# Patient Record
Sex: FEMALE | Race: WHITE | NOT HISPANIC OR LATINO | Employment: OTHER | URBAN - METROPOLITAN AREA
[De-identification: names, ages, dates, MRNs, and addresses within clinical notes are randomized per-mention and may not be internally consistent; named-entity substitution may affect disease eponyms.]

---

## 2017-03-20 ENCOUNTER — HOSPITAL ENCOUNTER (OUTPATIENT)
Dept: LAB | Facility: MEDICAL CENTER | Age: 57
End: 2017-03-20
Attending: PSYCHIATRY & NEUROLOGY
Payer: MEDICARE

## 2017-03-20 LAB
ALBUMIN SERPL BCP-MCNC: 4.2 G/DL (ref 3.2–4.9)
ALBUMIN/GLOB SERPL: 1.2 G/DL
ALP SERPL-CCNC: 100 U/L (ref 30–99)
ALT SERPL-CCNC: 15 U/L (ref 2–50)
ANION GAP SERPL CALC-SCNC: 9 MMOL/L (ref 0–11.9)
AST SERPL-CCNC: 25 U/L (ref 12–45)
BASOPHILS # BLD AUTO: 0.1 K/UL (ref 0–0.12)
BASOPHILS NFR BLD AUTO: 1.4 % (ref 0–1.8)
BILIRUB SERPL-MCNC: 0.3 MG/DL (ref 0.1–1.5)
BUN SERPL-MCNC: 17 MG/DL (ref 8–22)
CALCIUM SERPL-MCNC: 9.7 MG/DL (ref 8.5–10.5)
CHLORIDE SERPL-SCNC: 109 MMOL/L (ref 96–112)
CO2 SERPL-SCNC: 25 MMOL/L (ref 20–33)
CREAT SERPL-MCNC: 0.94 MG/DL (ref 0.5–1.4)
EOSINOPHIL # BLD: 0.34 K/UL (ref 0–0.51)
EOSINOPHIL NFR BLD AUTO: 4.6 % (ref 0–6.9)
ERYTHROCYTE [DISTWIDTH] IN BLOOD BY AUTOMATED COUNT: 46.3 FL (ref 35.9–50)
GLOBULIN SER CALC-MCNC: 3.6 G/DL (ref 1.9–3.5)
GLUCOSE SERPL-MCNC: 80 MG/DL (ref 65–99)
HCT VFR BLD AUTO: 55.7 % (ref 37–47)
HGB BLD-MCNC: 17.5 G/DL (ref 12–16)
IMM GRANULOCYTES # BLD AUTO: 0.01 K/UL (ref 0–0.11)
IMM GRANULOCYTES NFR BLD AUTO: 0.1 % (ref 0–0.9)
LYMPHOCYTES # BLD: 2.37 K/UL (ref 1–4.8)
LYMPHOCYTES NFR BLD AUTO: 32.4 % (ref 22–41)
MCH RBC QN AUTO: 28 PG (ref 27–33)
MCHC RBC AUTO-ENTMCNC: 31.4 G/DL (ref 33.6–35)
MCV RBC AUTO: 89 FL (ref 81.4–97.8)
MONOCYTES # BLD: 0.37 K/UL (ref 0–0.85)
MONOCYTES NFR BLD AUTO: 5.1 % (ref 0–13.4)
NEUTROPHILS # BLD: 4.13 K/UL (ref 2–7.15)
NEUTROPHILS NFR BLD AUTO: 56.4 % (ref 44–72)
NRBC # BLD AUTO: 0 K/UL
NRBC BLD-RTO: 0 /100 WBC
PLATELET # BLD AUTO: 266 K/UL (ref 164–446)
PMV BLD AUTO: 9.3 FL (ref 9–12.9)
POTASSIUM SERPL-SCNC: 5.4 MMOL/L (ref 3.6–5.5)
PROT SERPL-MCNC: 7.8 G/DL (ref 6–8.2)
RBC # BLD AUTO: 6.26 M/UL (ref 4.2–5.4)
SODIUM SERPL-SCNC: 143 MMOL/L (ref 135–145)
WBC # BLD AUTO: 7.3 K/UL (ref 4.8–10.8)

## 2017-03-20 PROCEDURE — 36415 COLL VENOUS BLD VENIPUNCTURE: CPT

## 2017-03-20 PROCEDURE — 85025 COMPLETE CBC W/AUTO DIFF WBC: CPT

## 2017-03-20 PROCEDURE — 80053 COMPREHEN METABOLIC PANEL: CPT

## 2018-08-06 ENCOUNTER — PATIENT OUTREACH (OUTPATIENT)
Dept: HEALTH INFORMATION MANAGEMENT | Facility: OTHER | Age: 58
End: 2018-08-06

## 2018-08-06 NOTE — PROGRESS NOTES
1. Attempt #:1    2. HealthConnect Verified: no    3. Verify PCP: yes    4. Review Care Team: yes    5. WebIZ Checked & Epic Updated: No WebIZ record  · WebIZ Recommendations: COULD NOT FIND ON WEB IZ  · Is patient due for Tdap? YES. Patient was notified of copay/out of pocket cost.  · Is patient due for Shingles? YES. Patient was not notified of copay/out of pocket cost.    6. Communication Preference Obtained: yes    7. Annual Wellness Visit Scheduling  · Scheduling Status:Scheduled     9. Care Gap Scheduling (Attempt to Schedule EACH Overdue Care Gap!)     Scheduled patient for Annual Wellness Visit, Immunizations: PNEUMOVAX (PPSV23), TDAP and SHINGRIX (Shingles), Mammogram and Pap     10. Disruptor Beam Activation: already active    11. Disruptor Beam Nichole: no    12. Virtual Visits: no    13. Opt In to Text Messages: yes

## 2018-08-20 ENCOUNTER — OFFICE VISIT (OUTPATIENT)
Dept: MEDICAL GROUP | Facility: MEDICAL CENTER | Age: 58
End: 2018-08-20
Payer: MEDICARE

## 2018-08-20 VITALS
WEIGHT: 158.73 LBS | OXYGEN SATURATION: 93 % | HEIGHT: 62 IN | DIASTOLIC BLOOD PRESSURE: 80 MMHG | RESPIRATION RATE: 16 BRPM | HEART RATE: 86 BPM | SYSTOLIC BLOOD PRESSURE: 128 MMHG | BODY MASS INDEX: 29.21 KG/M2 | TEMPERATURE: 96.3 F

## 2018-08-20 DIAGNOSIS — Z13.29 SCREENING FOR THYROID DISORDER: ICD-10-CM

## 2018-08-20 DIAGNOSIS — I69.30 HISTORY OF CEREBROVASCULAR ACCIDENT (CVA) WITH RESIDUAL DEFICIT: ICD-10-CM

## 2018-08-20 DIAGNOSIS — Z12.31 ENCOUNTER FOR SCREENING MAMMOGRAM FOR BREAST CANCER: ICD-10-CM

## 2018-08-20 DIAGNOSIS — Z13.220 SCREENING, LIPID: ICD-10-CM

## 2018-08-20 DIAGNOSIS — Z12.11 COLON CANCER SCREENING: ICD-10-CM

## 2018-08-20 DIAGNOSIS — G40.909 SEIZURE DISORDER (HCC): ICD-10-CM

## 2018-08-20 DIAGNOSIS — F17.200 SMOKER: ICD-10-CM

## 2018-08-20 DIAGNOSIS — Z23 NEED FOR VACCINATION: ICD-10-CM

## 2018-08-20 DIAGNOSIS — Q27.30 AVM (ARTERIOVENOUS MALFORMATION): ICD-10-CM

## 2018-08-20 DIAGNOSIS — G81.94 LEFT HEMIPLEGIA (HCC): ICD-10-CM

## 2018-08-20 DIAGNOSIS — Z00.00 MEDICARE ANNUAL WELLNESS VISIT, SUBSEQUENT: ICD-10-CM

## 2018-08-20 DIAGNOSIS — E55.9 VITAMIN D DEFICIENCY DISEASE: ICD-10-CM

## 2018-08-20 PROCEDURE — 90670 PCV13 VACCINE IM: CPT | Performed by: NURSE PRACTITIONER

## 2018-08-20 PROCEDURE — G0439 PPPS, SUBSEQ VISIT: HCPCS | Mod: 25 | Performed by: NURSE PRACTITIONER

## 2018-08-20 PROCEDURE — G0009 ADMIN PNEUMOCOCCAL VACCINE: HCPCS | Performed by: NURSE PRACTITIONER

## 2018-08-20 ASSESSMENT — ACTIVITIES OF DAILY LIVING (ADL): BATHING_REQUIRES_ASSISTANCE: 0

## 2018-08-20 ASSESSMENT — PATIENT HEALTH QUESTIONNAIRE - PHQ9: CLINICAL INTERPRETATION OF PHQ2 SCORE: 0

## 2018-08-20 ASSESSMENT — ENCOUNTER SYMPTOMS: GENERAL WELL-BEING: GOOD

## 2018-08-20 NOTE — PROGRESS NOTES
Chief Complaint   Patient presents with   • Annual Wellness Visit         HPI:  Rubi is a 57 y.o. here for Medicare Annual Wellness Visit  Last seen 2 years ago      Patient Active Problem List    Diagnosis Date Noted   • Vitamin D deficiency disease 07/05/2016   • AVM (arteriovenous malformation) 06/29/2016   • S/P partial colectomy 06/29/2016   • Left hemiplegia (HCC) 06/29/2016   • History of cerebrovascular accident (CVA) with residual deficit 06/29/2016   • Smoker 06/29/2016       Current Outpatient Prescriptions   Medication Sig Dispense Refill   • oxcarbazepine (TRILEPTAL) 150 MG Tab      • vitamin D, Ergocalciferol, (DRISDOL) 02553 UNITS Cap capsule Take 1 Cap by mouth every 7 days. (Patient not taking: Reported on 8/20/2018) 12 Cap 5   • varenicline (CHANTIX STARTING MONTH PAK) 0.5 MG X 11 & 1 MG X 42 tablet Per packet (Patient not taking: Reported on 8/20/2018) 56 Tab 3   • DECADRON PO        No current facility-administered medications for this visit.         The patient is not taking most meds on list (only taking oxcarbazeprine) medication(s) due to:  cost of the medication.  Current supplements as per medication list.     Allergies: Patient has no known allergies.    Current social contact/activities: PT reports living with , visit with family and friends, baby sits grand kids daily     Is patient current with immunizations? No, due for PREVNAR (PCV13) , TDAP and SHINGRIX (Shingles). Patient is interested in receiving PREVNAR (PCV13)  today.    She  reports that she has been smoking Cigarettes.  She has a 42.00 pack-year smoking history. She has never used smokeless tobacco. She reports that she does not drink alcohol or use drugs.  Ready to quit: Not Answered  Counseling given: Not Answered        DPA/Advanced directive: Patient does not have an Advanced Directive.  A packet and workshop information was given on Advanced Directives.    ROS:    Gait: Uses no assistive device   Ostomy: No    Other tubes: No   Amputations: No   Chronic oxygen use No   Last eye exam several years ago   Wears hearing aids: No   : Denies any urinary leakage during the last 6 months     Depression Screening    Little interest or pleasure in doing things?  0 - not at all  Feeling down, depressed, or hopeless? 0 - not at all  Patient Health Questionnaire Score: 0        Screening for Cognitive Impairment    Three Minute Recall (leader, season, table)  3/3    Yomi clock face with all 12 numbers and set the hands to show 10 past 11.  Yes 2/2  If cognitive concerns identified, deferred for follow up unless specifically addressed in assessment and plan.    Fall Risk Assessment    Has the patient had two or more falls in the last year or any fall with injury in the last year?  No  If fall risk identified, deferred for follow up unless specifically addressed in assessment and plan.    Safety Assessment    Throw rugs on floor.  No  Handrails on all stairs.  Yes  Good lighting in all hallways.  Yes  Difficulty hearing.  No  Patient counseled about all safety risks that were identified.    Functional Assessment ADLs    Are there any barriers preventing you from cooking for yourself or meeting nutritional needs?  No.    Are there any barriers preventing you from driving safely or obtaining transportation?  No.    Are there any barriers preventing you from using a telephone or calling for help?  No.    Are there any barriers preventing you from shopping?  No.    Are there any barriers preventing you from taking care of your own finances?  No.    Are there any barriers preventing you from managing your medications?  No.    Are there any barriers preventing you from showering, bathing or dressing yourself?  No.    Are you currently engaging in any exercise or physical activity?  Yes.  PT reports doing own house hold choirs and gardening.  What is your perception of your health?  Good.    Health Maintenance Summary                Annual  Wellness Visit Overdue 1960     IMM DTaP/Tdap/Td Vaccine Overdue 11/21/1979     IMM PNEUMOCOCCAL 19-64 (ADULT) MEDIUM RISK SERIES Overdue 11/21/1979     PAP SMEAR Overdue 5/1/1988      Done 5/1/1985 nl    MAMMOGRAM Overdue 11/21/2000     COLONOSCOPY Overdue 11/21/2010     IMM ZOSTER VACCINES Overdue 11/21/2010     IMM INFLUENZA Next Due 9/1/2018           Patient Care Team:  DARIUS Mazariegos as PCP - General (Family Medicine)  Sam Sullivan M.D. as Consulting Physician (Neurology)    Social History   Substance Use Topics   • Smoking status: Current Every Day Smoker     Packs/day: 1.00     Years: 42.00     Types: Cigarettes   • Smokeless tobacco: Never Used      Comment: Started smoking at age 16   • Alcohol use No     Family History   Problem Relation Age of Onset   • Lung Disease Mother         asthma   • Heart Attack Mother    • Diabetes Father    • Cancer Neg Hx      She  has a past medical history of Asthma; AVM (arteriovenous malformation) (rut/durga/ann, 2008); Migraine; and Partial bowel obstruction (HCC). She also has no past medical history of Anxiety; Arrhythmia; Blood transfusion without reported diagnosis; Brain tumor (HCC); Cancer (HCC); CHF (congestive heart failure) (HCC); Clotting disorder (HCC); COPD (chronic obstructive pulmonary disease) (HCC); Depression; Diabetes (HCC); Diabetic neuropathy (HCC); Goiter; Heart attack (HCC); Heart murmur; Hyperlipidemia; Hypertension; IBD (inflammatory bowel disease); Muscle disorder; Osteoporosis; Pulmonary emphysema (HCC); Seizure (HCC); Stroke (HCC); Substance abuse; or Tuberculosis.   Past Surgical History:   Procedure Laterality Date   • COLON RESECTION      colostomy, colostomy take d   • COLOSTOMY TAKEDOWN     • CRANIOTOMY      AVM treatment   • TONSILLECTOMY     • TUBAL COAGULATION LAPAROSCOPIC BILATERAL             Exam:     Blood pressure 128/80, pulse 86, temperature (!) 35.7 °C (96.3 °F), resp. rate 16, height 1.575 m (5'  "2\"), weight 72 kg (158 lb 11.7 oz), SpO2 93 %. Body mass index is 29.03 kg/m².    Hearing good.    Dentition multiple carries  Alert, oriented in no acute distress.  Eye contact is good, speech goal directed, affect calm  RRR  CTAB    Assessment and Plan. The following treatment and monitoring plan is recommended:    1. Medicare annual wellness visit, subsequent screenings reviewed    - Annual Wellness Visit - Includes PPPS Subsequent ()    2. History of cerebrovascular accident (CVA) with residual deficit/Left hemiplegia (HCC)/ AVM (arteriovenous malformation)/ Seizure disorder (HCC)  stable.  Asymptomatic with seizures.  Compliant withTrileptal.  She is followed routinely by her neurologist Dr. Sullivan   - COMP METABOLIC PANEL; Future  - CBC WITH DIFFERENTIAL; Future  - VITAMIN B12; Future  - FOLATE; Future    6. Vitamin D deficiency disease  Recommend restarting over-the-counter vitamin D 2000 units daily, will monitor her vitamin D levels  - VITAMIN D,25 HYDROXY; Future    7. Smoker  Cessation encouraged, not interested in quitting today    8. Encounter for screening mammogram for breast cancer  ordered  - MA-SCREEN MAMMO W/CAD-BILAT; Future    9. Colon cancer screening  Referral  - REFERRAL TO GASTROENTEROLOGY    10. Screening, lipid  Ordered  - LIPID PROFILE; Future    11. Screening for thyroid disorder  Ordered  - TSH; Future    12. Need for vaccination  I have placed the below orders and discussed them with an approved delegating provider. The MA is performing the below orders under the direction of an office MD.  -Given today.  - PNEUMOCOCCAL CONJUGATE VACCINE 13-VALENT        Services suggested: No services needed at this time  Health Care Screening recommendations as per orders if indicated.  Referrals offered: PT/OT/Nutrition counseling/Behavioral Health/Smoking cessation as per orders if indicated.    Discussion today about general wellness and lifestyle habits:    · Prevent falls and reduce trip " hazards; Cautioned about securing or removing rugs.  · Have a working fire alarm and carbon monoxide detector;   · Engage in regular physical activity and social activities       Follow-up: No Follow-up on file.

## 2018-08-24 ENCOUNTER — HOSPITAL ENCOUNTER (OUTPATIENT)
Dept: LAB | Facility: MEDICAL CENTER | Age: 58
End: 2018-08-24
Attending: NURSE PRACTITIONER
Payer: MEDICARE

## 2018-08-24 ENCOUNTER — HOSPITAL ENCOUNTER (OUTPATIENT)
Dept: RADIOLOGY | Facility: MEDICAL CENTER | Age: 58
End: 2018-08-24
Attending: NURSE PRACTITIONER
Payer: MEDICARE

## 2018-08-24 DIAGNOSIS — Z13.220 SCREENING, LIPID: ICD-10-CM

## 2018-08-24 DIAGNOSIS — Z13.29 SCREENING FOR THYROID DISORDER: ICD-10-CM

## 2018-08-24 DIAGNOSIS — Z12.39 SCREENING FOR BREAST CANCER: ICD-10-CM

## 2018-08-24 DIAGNOSIS — G40.909 SEIZURE DISORDER (HCC): ICD-10-CM

## 2018-08-24 DIAGNOSIS — E55.9 VITAMIN D DEFICIENCY DISEASE: ICD-10-CM

## 2018-08-24 LAB
25(OH)D3 SERPL-MCNC: 22 NG/ML (ref 30–100)
ALBUMIN SERPL BCP-MCNC: 4.3 G/DL (ref 3.2–4.9)
ALBUMIN/GLOB SERPL: 1.2 G/DL
ALP SERPL-CCNC: 92 U/L (ref 30–99)
ALT SERPL-CCNC: 13 U/L (ref 2–50)
ANION GAP SERPL CALC-SCNC: 9 MMOL/L (ref 0–11.9)
AST SERPL-CCNC: 18 U/L (ref 12–45)
BASOPHILS # BLD AUTO: 1 % (ref 0–1.8)
BASOPHILS # BLD: 0.08 K/UL (ref 0–0.12)
BILIRUB SERPL-MCNC: 0.3 MG/DL (ref 0.1–1.5)
BUN SERPL-MCNC: 16 MG/DL (ref 8–22)
CALCIUM SERPL-MCNC: 9.5 MG/DL (ref 8.5–10.5)
CHLORIDE SERPL-SCNC: 103 MMOL/L (ref 96–112)
CHOLEST SERPL-MCNC: 281 MG/DL (ref 100–199)
CO2 SERPL-SCNC: 25 MMOL/L (ref 20–33)
CREAT SERPL-MCNC: 0.87 MG/DL (ref 0.5–1.4)
EOSINOPHIL # BLD AUTO: 0.14 K/UL (ref 0–0.51)
EOSINOPHIL NFR BLD: 1.8 % (ref 0–6.9)
ERYTHROCYTE [DISTWIDTH] IN BLOOD BY AUTOMATED COUNT: 43.5 FL (ref 35.9–50)
FOLATE SERPL-MCNC: 11.2 NG/ML
GLOBULIN SER CALC-MCNC: 3.7 G/DL (ref 1.9–3.5)
GLUCOSE SERPL-MCNC: 84 MG/DL (ref 65–99)
HCT VFR BLD AUTO: 55 % (ref 37–47)
HDLC SERPL-MCNC: 43 MG/DL
HGB BLD-MCNC: 18.1 G/DL (ref 12–16)
IMM GRANULOCYTES # BLD AUTO: 0.03 K/UL (ref 0–0.11)
IMM GRANULOCYTES NFR BLD AUTO: 0.4 % (ref 0–0.9)
LDLC SERPL CALC-MCNC: 192 MG/DL
LYMPHOCYTES # BLD AUTO: 1.74 K/UL (ref 1–4.8)
LYMPHOCYTES NFR BLD: 22.1 % (ref 22–41)
MCH RBC QN AUTO: 28.6 PG (ref 27–33)
MCHC RBC AUTO-ENTMCNC: 32.9 G/DL (ref 33.6–35)
MCV RBC AUTO: 86.9 FL (ref 81.4–97.8)
MONOCYTES # BLD AUTO: 0.46 K/UL (ref 0–0.85)
MONOCYTES NFR BLD AUTO: 5.8 % (ref 0–13.4)
NEUTROPHILS # BLD AUTO: 5.44 K/UL (ref 2–7.15)
NEUTROPHILS NFR BLD: 68.9 % (ref 44–72)
NRBC # BLD AUTO: 0 K/UL
NRBC BLD-RTO: 0 /100 WBC
PLATELET # BLD AUTO: 270 K/UL (ref 164–446)
PMV BLD AUTO: 9.3 FL (ref 9–12.9)
POTASSIUM SERPL-SCNC: 4 MMOL/L (ref 3.6–5.5)
PROT SERPL-MCNC: 8 G/DL (ref 6–8.2)
RBC # BLD AUTO: 6.33 M/UL (ref 4.2–5.4)
SODIUM SERPL-SCNC: 137 MMOL/L (ref 135–145)
TRIGL SERPL-MCNC: 230 MG/DL (ref 0–149)
TSH SERPL DL<=0.005 MIU/L-ACNC: 1.15 UIU/ML (ref 0.38–5.33)
VIT B12 SERPL-MCNC: 297 PG/ML (ref 211–911)
WBC # BLD AUTO: 7.9 K/UL (ref 4.8–10.8)

## 2018-08-24 PROCEDURE — 77067 SCR MAMMO BI INCL CAD: CPT

## 2018-08-24 PROCEDURE — 84443 ASSAY THYROID STIM HORMONE: CPT | Mod: GA

## 2018-08-24 PROCEDURE — 82306 VITAMIN D 25 HYDROXY: CPT

## 2018-08-24 PROCEDURE — 80061 LIPID PANEL: CPT | Mod: GA

## 2018-08-24 PROCEDURE — 36415 COLL VENOUS BLD VENIPUNCTURE: CPT | Mod: GA

## 2018-08-24 PROCEDURE — 82607 VITAMIN B-12: CPT

## 2018-08-24 PROCEDURE — 82746 ASSAY OF FOLIC ACID SERUM: CPT

## 2018-08-24 PROCEDURE — 80053 COMPREHEN METABOLIC PANEL: CPT

## 2018-08-24 PROCEDURE — 85025 COMPLETE CBC W/AUTO DIFF WBC: CPT

## 2018-08-27 ENCOUNTER — HOSPITAL ENCOUNTER (OUTPATIENT)
Facility: MEDICAL CENTER | Age: 58
End: 2018-08-27
Attending: NURSE PRACTITIONER
Payer: MEDICARE

## 2018-08-27 ENCOUNTER — OFFICE VISIT (OUTPATIENT)
Dept: MEDICAL GROUP | Facility: MEDICAL CENTER | Age: 58
End: 2018-08-27
Payer: MEDICARE

## 2018-08-27 VITALS
DIASTOLIC BLOOD PRESSURE: 64 MMHG | SYSTOLIC BLOOD PRESSURE: 108 MMHG | HEART RATE: 71 BPM | OXYGEN SATURATION: 96 % | BODY MASS INDEX: 29.08 KG/M2 | TEMPERATURE: 97 F | WEIGHT: 158 LBS | HEIGHT: 62 IN | RESPIRATION RATE: 16 BRPM

## 2018-08-27 DIAGNOSIS — E78.00 PURE HYPERCHOLESTEROLEMIA: ICD-10-CM

## 2018-08-27 DIAGNOSIS — K43.9 VENTRAL HERNIA WITHOUT OBSTRUCTION OR GANGRENE: ICD-10-CM

## 2018-08-27 DIAGNOSIS — Z12.4 CERVICAL CANCER SCREENING: ICD-10-CM

## 2018-08-27 DIAGNOSIS — E56.9 VITAMIN DEFICIENCY: ICD-10-CM

## 2018-08-27 DIAGNOSIS — Z11.51 ENCOUNTER FOR SCREENING FOR HUMAN PAPILLOMAVIRUS (HPV): ICD-10-CM

## 2018-08-27 DIAGNOSIS — Z01.419 WELL WOMAN EXAM WITH ROUTINE GYNECOLOGICAL EXAM: ICD-10-CM

## 2018-08-27 PROCEDURE — G0101 CA SCREEN;PELVIC/BREAST EXAM: HCPCS | Performed by: NURSE PRACTITIONER

## 2018-08-27 PROCEDURE — 88175 CYTOPATH C/V AUTO FLUID REDO: CPT

## 2018-08-27 PROCEDURE — 87624 HPV HI-RISK TYP POOLED RSLT: CPT

## 2018-08-27 PROCEDURE — 99214 OFFICE O/P EST MOD 30 MIN: CPT | Mod: 25 | Performed by: NURSE PRACTITIONER

## 2018-08-27 RX ORDER — ATORVASTATIN CALCIUM 40 MG/1
40 TABLET, FILM COATED ORAL
Qty: 90 TAB | Refills: 3 | Status: SHIPPED | OUTPATIENT
Start: 2018-08-27 | End: 2019-09-11 | Stop reason: SDUPTHER

## 2018-08-28 DIAGNOSIS — Z12.4 CERVICAL CANCER SCREENING: ICD-10-CM

## 2018-08-28 DIAGNOSIS — Z11.51 ENCOUNTER FOR SCREENING FOR HUMAN PAPILLOMAVIRUS (HPV): ICD-10-CM

## 2018-08-28 DIAGNOSIS — Z01.419 WELL WOMAN EXAM WITH ROUTINE GYNECOLOGICAL EXAM: ICD-10-CM

## 2018-08-28 NOTE — PROGRESS NOTES
"CC: Gynecologic Exam        HPI:   Rubi presents today for the followin. Well woman exam with routine gynecological exam/. Encounter for screening for human papillomavirus (HPV)/Cervical cancer screening  Here for routine Pap smear.  Last Pap 36 years ago.  Monogamous.  No pelvic bleeding or abnormal symptoms to complain of    4. Pure hypercholesterolemia  Labs done last week showed significantly elevated LDL with approximately being 200.  Personal history of stroke.  Not on a statin.    7. Vitamin deficiency  Does have borderline low vitamin D.  Does not take any supplements    Current Outpatient Prescriptions   Medication Sig Dispense Refill   • atorvastatin (LIPITOR) 40 MG Tab Take 1 Tab by mouth every bedtime. 90 Tab 3   • oxcarbazepine (TRILEPTAL) 150 MG Tab        No current facility-administered medications for this visit.      Social History   Substance Use Topics   • Smoking status: Current Every Day Smoker     Packs/day: 1.00     Years: 42.00     Types: Cigarettes   • Smokeless tobacco: Never Used      Comment: Started smoking at age 16   • Alcohol use No     I reviewed patients allergies, problem list and medications today in [facility].    ROS: Any/all pertinent positives listed in the HPI, otherwise all others reviewed are negative today.        /64   Pulse 71   Temp 36.1 °C (97 °F)   Resp 16   Ht 1.575 m (5' 2\")   Wt 71.7 kg (158 lb)   SpO2 96%   BMI 28.90 kg/m²   Physical Exam:    Gen:         Alert and oriented, No apparent distress. WDWN  Psych:     A+Ox3, normal affect and mood  Skin:     Warm, dry and intact. Good turgor       No rashes in visible areas.  Eye:         Conjunctiva clear, lids normal  ENMT:     Lips without lesions, fair dentition       Oropharynx clear  Neck:       No Lymphadenopathy, Thyromegaly, Bruits.       Trachea midline, no masses  Lungs:     Clear to auscultation bilaterally, no rales or rhonchi       Unlabored respiratory effort  CV:          Regular rate and " rhythm, S1, S2. No murmurs.       No Edema  Abd:         Soft non tender, non distended. Normal active bowel sounds.         No  Hepatosplenomegaly, No pulsatile masses.       Does have a soft protuberance to the right of her colostomy/colostomy takedown scars.  Does bulge slightly with Valsalva.  Patient self reports that it increases if she is bloated or constipated.  She has no associated pain or discomfort with this              Ext:          No clubbing, cyanosis, edema. Pedal pulses 2+ bilaterally.    Breasts:  No dimpling/nipple changes/masses bilaterally.  Reviewed SBEs  Vulva:       No lesions/erythema Vault: No erythema/odor/discharge  Cervix:     No lesions/ No CMT      Uterus: 7 cm; midline; nontender to bimanual; no adnexal masses or tenderness        Assessment and Plan.   57 y.o. female here for the followin. Well woman exam with routine gynecological exam/ Encounter for screening for human papillomavirus (HPV)/ Cervical cancer screening  Clinically stable.  If normal repeat in 3 years  - THINPREP PAP WITH HPV; Future    4.    Ventral hernia without obstruction or gangrene  Clinically stable without changes in years.  Previous general surgeon with Dr. Ibrahim up for her colostomy and colostomy takedown.  - US-HERNIA ABDOMEN; Future    6. Pure hypercholesterolemia  Clinically stable.  Will start atorvastatin 40 mg.  Will recheck labs in 3 months  - atorvastatin (LIPITOR) 40 MG Tab; Take 1 Tab by mouth every bedtime.  Dispense: 90 Tab; Refill: 3  - COMP METABOLIC PANEL; Future  - LIPID PROFILE; Future    7. Vitamin deficiency  Patient will start an oral B12 and will monitor levels in 3 months  - CBC WITH DIFFERENTIAL; Future  - VITAMIN B12; Future

## 2018-08-29 LAB
CYTOLOGY REG CYTOL: NORMAL
HPV HR 12 DNA CVX QL NAA+PROBE: NEGATIVE
HPV16 DNA SPEC QL NAA+PROBE: NEGATIVE
HPV18 DNA SPEC QL NAA+PROBE: NEGATIVE
SPECIMEN SOURCE: NORMAL

## 2018-08-31 ENCOUNTER — HOSPITAL ENCOUNTER (OUTPATIENT)
Dept: RADIOLOGY | Facility: MEDICAL CENTER | Age: 58
End: 2018-08-31
Attending: NURSE PRACTITIONER
Payer: MEDICARE

## 2018-08-31 DIAGNOSIS — K43.9 VENTRAL HERNIA WITHOUT OBSTRUCTION OR GANGRENE: ICD-10-CM

## 2018-08-31 PROCEDURE — 76705 ECHO EXAM OF ABDOMEN: CPT

## 2018-09-03 ENCOUNTER — TELEPHONE (OUTPATIENT)
Dept: MEDICAL GROUP | Facility: MEDICAL CENTER | Age: 58
End: 2018-09-03

## 2018-09-03 DIAGNOSIS — K46.9 HERNIA DUE TO COLOSTOMY (HCC): ICD-10-CM

## 2018-09-03 DIAGNOSIS — K94.09 HERNIA DUE TO COLOSTOMY (HCC): ICD-10-CM

## 2018-09-03 NOTE — TELEPHONE ENCOUNTER
Jeovany notify patient  The uS did show a hernia (which we knew).  It includes intestine and doesn't go back down on its own.  Therefore I do want her to consult with her surgeon to see if this needs to be resolved to prevent issues with her bowel later.  I now this is very stable without changes for years.      My chart sent as well  refe ruby placed to consult.

## 2018-09-04 NOTE — TELEPHONE ENCOUNTER
Tried to contact pt by phone but both numbers aren't working. Pt saw the imaging results but unsure if she saw message. Will send Caliopat.

## 2019-05-21 ENCOUNTER — OFFICE VISIT (OUTPATIENT)
Dept: MEDICAL GROUP | Facility: MEDICAL CENTER | Age: 59
End: 2019-05-21
Payer: MEDICARE

## 2019-05-21 VITALS
SYSTOLIC BLOOD PRESSURE: 124 MMHG | OXYGEN SATURATION: 95 % | HEIGHT: 62 IN | WEIGHT: 162 LBS | HEART RATE: 90 BPM | TEMPERATURE: 98.7 F | BODY MASS INDEX: 29.81 KG/M2 | DIASTOLIC BLOOD PRESSURE: 74 MMHG | RESPIRATION RATE: 16 BRPM

## 2019-05-21 DIAGNOSIS — E78.5 DYSLIPIDEMIA: ICD-10-CM

## 2019-05-21 DIAGNOSIS — Z12.11 COLON CANCER SCREENING: ICD-10-CM

## 2019-05-21 DIAGNOSIS — G40.909 SEIZURE DISORDER (HCC): ICD-10-CM

## 2019-05-21 DIAGNOSIS — I69.30 HISTORY OF CEREBROVASCULAR ACCIDENT (CVA) WITH RESIDUAL DEFICIT: ICD-10-CM

## 2019-05-21 DIAGNOSIS — Q27.30 AVM (ARTERIOVENOUS MALFORMATION): ICD-10-CM

## 2019-05-21 DIAGNOSIS — R06.83 SNORING: ICD-10-CM

## 2019-05-21 PROCEDURE — 99214 OFFICE O/P EST MOD 30 MIN: CPT | Performed by: NURSE PRACTITIONER

## 2019-05-21 ASSESSMENT — PATIENT HEALTH QUESTIONNAIRE - PHQ9: CLINICAL INTERPRETATION OF PHQ2 SCORE: 0

## 2019-05-21 NOTE — PROGRESS NOTES
"CC: Referral Needed        HPI:     Rubi presents today for the followin. AVM (arteriovenous malformation)/ History of cerebrovascular accident (CVA) with residual deficit/Seizure disorder (HCC)  Currently followed by Dr. Sullivan however he is retiring.  She just saw him a few weeks ago and he recommended she get established with a new neurologist provider.  She is here today stating she needs a referral for this.  She is however compliant with her Trileptal and doing well in terms of seizures.  She denies needing a refill currently    4. Colon cancer screening  Declines    5. Dyslipidemia  She did start the atorvastatin 40 mg in January.  She is tolerating well and has no adverse reaction.  She is overdue to repeat her lipid panel    6. Snoring  We did review the labs that she had done in August we did discuss an elevated H&H.  She does snore and her  states she wakes her up because she is snoring so loud.  She also is a significant smoker.    Current Outpatient Prescriptions   Medication Sig Dispense Refill   • atorvastatin (LIPITOR) 40 MG Tab Take 1 Tab by mouth every bedtime. 90 Tab 3   • oxcarbazepine (TRILEPTAL) 150 MG Tab        No current facility-administered medications for this visit.      Social History   Substance Use Topics   • Smoking status: Current Every Day Smoker     Packs/day: 1.00     Years: 42.00     Types: Cigarettes   • Smokeless tobacco: Never Used      Comment: Started smoking at age 16   • Alcohol use No     I reviewed patients allergies, problem list and medications today in [facility].    ROS: Any/all pertinent positives listed in the HPI, otherwise all others reviewed are negative today.      /74 (BP Location: Left arm)   Pulse 90   Temp 37.1 °C (98.7 °F)   Resp 16   Ht 1.575 m (5' 2\")   Wt 73.5 kg (162 lb)   SpO2 95%   BMI 29.63 kg/m²     Exam:    Gen: Alert and oriented, No apparent distress. WDWN  Psych: A+Ox3, normal affect and mood  Skin: Warm, dry and intact. " Good turgor   No rashes in visible areas.  Eye: Conjunctiva clear, lids normal  ENMT: Lips without lesions, fair-poor dentition  Neck: No Lymphadenopathy, Thyromegaly, Bruits.   Trachea midline, no masses  Lungs: Clear to auscultation bilaterally, no rales or rhonchi   Unlabored respiratory effort.   CV: Regular rate and rhythm, S1, S2. No murmurs.   No Edema        Assessment and Plan.   58 y.o. female with the following issues.    1. AVM (arteriovenous malformation)/History of cerebrovascular accident (CVA) with residual deficit//Seizure disorder (HCC)  Clinically stable.  She will continue her Trileptal and referral was placed to establish with a new neurologist.  - REFERRAL TO NEUROLOGY    4. Colon cancer screening  Declines both home testing and colonoscopy    5. Dyslipidemia  Continue statin, due for lipid panel.  Should have this done before I see her in August.  We may need dose adjustment.  She is encouraged to quit smoking    6. Snoring  OPO on RA.  She is encouraged to quit smoking        Patient will be due for the pneumococcal 23 when she comes in in August.  She will consider Tdap at that time.

## 2019-05-21 NOTE — LETTER
May 21, 2019     Rubi Urbina  [address] [place] [address]      Dear Rubi:    Thank you for enrolling in [employer]. Please follow the instructions below to securely access your online medical record. [employer] allows you to send messages to your healthcare team, view certain test results, renew your prescriptions, schedule appointments, and more.     How Do I Sign Up?  1. In your Internet browser, go to  [email or web address]  2. Click on the Enter Code link in the Sign In box. You will see the New Member Sign Up page.  3. Enter your TVDeck Access Code exactly as it appears below (case sensitive). You will not need to use this code after you’ve completed the sign-up process. If you do not sign up before the expiration date, you must request a new code.  TV[employer] Access Code: [email or web address]  Expires: 6/20/2019  3:51 PM    4. Enter your Email address and Date of Birth (mm/dd/yyyy) as indicated and click Submit. You will be taken to the next sign-up page.  5. Create a TVDeck ID (case sensitive) . This will be your TV[employer]ck login ID and cannot be changed, so think of one that is secure and easy to remember.  6. Create a TV[employer]ck password  (case sensitive).   · Your password must be a length of at least 6 characters/digits.  · It must include at least 1 numeric.  · You can change your password at any time.  7. Enter your Password Reset Question and Answer. This can be used at a later time if you forget your password.   8. Enter your e-mail address. You will receive e-mail notification when new information is available in [employer].  9. Click Sign Up. You can now view your medical record.     Additional Information  Please contact [employer] Customer Support at [phone] for any questions . Remember, [employer] is NOT to be used for urgent needs. For medical emergencies, dial 911.          Introducing [employer]    [facility]’s Secure, Online Health Connection      [facility] now offers  convenient, online access to your healthcare team and personal health information.  [employer] makes managing your healthcare easier than ever, and allows you to:  • Email your healthcare provider securely and privately  • Access your test results  • Request prescription refills 24 hours a day  • View your personal medical records from home  • Schedule or change your appointments  • View your Insurance and Billing Information  • Pay bills online ? Coming soon!        Sign below to get started:  I hereby request access to [employer] application.  I agree to abide by the [employer] Terms and Conditions, which will be provided to me upon activating my account.       __________________________________        _________________________  Name (Please Print)          Date of Birth     __________________________________       _________________________  Signature          Primary Care Provider      _______________  Date                          *For Internal Use Only: Please scan this form into the patient’s chart. Click on  - Select Patient - Attach to Encounter:  - Document Type: Consent   - Document Description: MyChart Consent

## 2019-06-21 ENCOUNTER — APPOINTMENT (OUTPATIENT)
Dept: ADMISSIONS | Facility: MEDICAL CENTER | Age: 59
DRG: 355 | End: 2019-06-21
Attending: SURGERY
Payer: MEDICARE

## 2019-06-21 DIAGNOSIS — Z01.810 PRE-OPERATIVE CARDIOVASCULAR EXAMINATION: ICD-10-CM

## 2019-06-21 LAB — EKG IMPRESSION: NORMAL

## 2019-06-26 ENCOUNTER — ANESTHESIA EVENT (OUTPATIENT)
Dept: SURGERY | Facility: MEDICAL CENTER | Age: 59
DRG: 355 | End: 2019-06-26
Payer: MEDICARE

## 2019-06-27 ENCOUNTER — ANESTHESIA (OUTPATIENT)
Dept: SURGERY | Facility: MEDICAL CENTER | Age: 59
DRG: 355 | End: 2019-06-27
Payer: MEDICARE

## 2019-06-27 ENCOUNTER — HOSPITAL ENCOUNTER (INPATIENT)
Facility: MEDICAL CENTER | Age: 59
LOS: 1 days | DRG: 355 | End: 2019-06-28
Attending: SURGERY | Admitting: SURGERY
Payer: MEDICARE

## 2019-06-27 PROBLEM — K43.6 INCARCERATED VENTRAL HERNIA: Status: ACTIVE | Noted: 2019-06-27

## 2019-06-27 PROBLEM — I95.9 HYPOTENSION: Status: ACTIVE | Noted: 2019-06-27

## 2019-06-27 LAB
ALBUMIN SERPL BCP-MCNC: 3.3 G/DL (ref 3.2–4.9)
ALBUMIN/GLOB SERPL: 1.3 G/DL
ALP SERPL-CCNC: 88 U/L (ref 30–99)
ALT SERPL-CCNC: 13 U/L (ref 2–50)
ANION GAP SERPL CALC-SCNC: 8 MMOL/L (ref 0–11.9)
AST SERPL-CCNC: 13 U/L (ref 12–45)
BILIRUB SERPL-MCNC: 0.4 MG/DL (ref 0.1–1.5)
BUN SERPL-MCNC: 15 MG/DL (ref 8–22)
CALCIUM SERPL-MCNC: 8.3 MG/DL (ref 8.5–10.5)
CHLORIDE SERPL-SCNC: 108 MMOL/L (ref 96–112)
CO2 SERPL-SCNC: 24 MMOL/L (ref 20–33)
CREAT SERPL-MCNC: 0.89 MG/DL (ref 0.5–1.4)
EKG IMPRESSION: NORMAL
ERYTHROCYTE [DISTWIDTH] IN BLOOD BY AUTOMATED COUNT: 44.4 FL (ref 35.9–50)
ERYTHROCYTE [DISTWIDTH] IN BLOOD BY AUTOMATED COUNT: 45.8 FL (ref 35.9–50)
GLOBULIN SER CALC-MCNC: 2.5 G/DL (ref 1.9–3.5)
GLUCOSE SERPL-MCNC: 175 MG/DL (ref 65–99)
HCT VFR BLD AUTO: 39.8 % (ref 37–47)
HCT VFR BLD AUTO: 46.3 % (ref 37–47)
HGB BLD-MCNC: 12.3 G/DL (ref 12–16)
HGB BLD-MCNC: 12.5 G/DL (ref 12–16)
HGB BLD-MCNC: 14.6 G/DL (ref 12–16)
MCH RBC QN AUTO: 27.6 PG (ref 27–33)
MCH RBC QN AUTO: 27.7 PG (ref 27–33)
MCHC RBC AUTO-ENTMCNC: 30.9 G/DL (ref 33.6–35)
MCHC RBC AUTO-ENTMCNC: 31.5 G/DL (ref 33.6–35)
MCV RBC AUTO: 87.9 FL (ref 81.4–97.8)
MCV RBC AUTO: 89.2 FL (ref 81.4–97.8)
PLATELET # BLD AUTO: 253 K/UL (ref 164–446)
PLATELET # BLD AUTO: 254 K/UL (ref 164–446)
PMV BLD AUTO: 8.5 FL (ref 9–12.9)
PMV BLD AUTO: 8.7 FL (ref 9–12.9)
POTASSIUM SERPL-SCNC: 4.2 MMOL/L (ref 3.6–5.5)
PROT SERPL-MCNC: 5.8 G/DL (ref 6–8.2)
RBC # BLD AUTO: 4.46 M/UL (ref 4.2–5.4)
RBC # BLD AUTO: 5.27 M/UL (ref 4.2–5.4)
SODIUM SERPL-SCNC: 140 MMOL/L (ref 135–145)
TROPONIN I SERPL-MCNC: <0.01 NG/ML (ref 0–0.04)
WBC # BLD AUTO: 15 K/UL (ref 4.8–10.8)
WBC # BLD AUTO: 16 K/UL (ref 4.8–10.8)

## 2019-06-27 PROCEDURE — 700111 HCHG RX REV CODE 636 W/ 250 OVERRIDE (IP): Performed by: SURGERY

## 2019-06-27 PROCEDURE — 160009 HCHG ANES TIME/MIN: Performed by: SURGERY

## 2019-06-27 PROCEDURE — 160027 HCHG SURGERY MINUTES - 1ST 30 MINS LEVEL 2: Performed by: SURGERY

## 2019-06-27 PROCEDURE — 93010 ELECTROCARDIOGRAM REPORT: CPT | Performed by: INTERNAL MEDICINE

## 2019-06-27 PROCEDURE — 93005 ELECTROCARDIOGRAM TRACING: CPT | Performed by: SURGERY

## 2019-06-27 PROCEDURE — A9270 NON-COVERED ITEM OR SERVICE: HCPCS | Performed by: ANESTHESIOLOGY

## 2019-06-27 PROCEDURE — 700101 HCHG RX REV CODE 250

## 2019-06-27 PROCEDURE — 85027 COMPLETE CBC AUTOMATED: CPT

## 2019-06-27 PROCEDURE — 160025 RECOVERY II MINUTES (STATS): Performed by: SURGERY

## 2019-06-27 PROCEDURE — 700111 HCHG RX REV CODE 636 W/ 250 OVERRIDE (IP): Performed by: ANESTHESIOLOGY

## 2019-06-27 PROCEDURE — 85018 HEMOGLOBIN: CPT

## 2019-06-27 PROCEDURE — 160035 HCHG PACU - 1ST 60 MINS PHASE I: Performed by: SURGERY

## 2019-06-27 PROCEDURE — 160048 HCHG OR STATISTICAL LEVEL 1-5: Performed by: SURGERY

## 2019-06-27 PROCEDURE — A6402 STERILE GAUZE <= 16 SQ IN: HCPCS | Performed by: SURGERY

## 2019-06-27 PROCEDURE — 501838 HCHG SUTURE GENERAL: Performed by: SURGERY

## 2019-06-27 PROCEDURE — 0WUF0JZ SUPPLEMENT ABDOMINAL WALL WITH SYNTHETIC SUBSTITUTE, OPEN APPROACH: ICD-10-PCS | Performed by: SURGERY

## 2019-06-27 PROCEDURE — 84484 ASSAY OF TROPONIN QUANT: CPT

## 2019-06-27 PROCEDURE — C1781 MESH (IMPLANTABLE): HCPCS | Performed by: SURGERY

## 2019-06-27 PROCEDURE — 700101 HCHG RX REV CODE 250: Performed by: ANESTHESIOLOGY

## 2019-06-27 PROCEDURE — 160038 HCHG SURGERY MINUTES - EA ADDL 1 MIN LEVEL 2: Performed by: SURGERY

## 2019-06-27 PROCEDURE — 80053 COMPREHEN METABOLIC PANEL: CPT

## 2019-06-27 PROCEDURE — 160002 HCHG RECOVERY MINUTES (STAT): Performed by: SURGERY

## 2019-06-27 PROCEDURE — 160047 HCHG PACU  - EA ADDL 30 MINS PHASE II: Performed by: SURGERY

## 2019-06-27 PROCEDURE — 160036 HCHG PACU - EA ADDL 30 MINS PHASE I: Performed by: SURGERY

## 2019-06-27 PROCEDURE — 160046 HCHG PACU - 1ST 60 MINS PHASE II: Performed by: SURGERY

## 2019-06-27 PROCEDURE — 700105 HCHG RX REV CODE 258: Performed by: ANESTHESIOLOGY

## 2019-06-27 PROCEDURE — 770022 HCHG ROOM/CARE - ICU (200)

## 2019-06-27 PROCEDURE — 99233 SBSQ HOSP IP/OBS HIGH 50: CPT | Performed by: SURGERY

## 2019-06-27 PROCEDURE — 700105 HCHG RX REV CODE 258: Performed by: SURGERY

## 2019-06-27 PROCEDURE — 700102 HCHG RX REV CODE 250 W/ 637 OVERRIDE(OP): Performed by: ANESTHESIOLOGY

## 2019-06-27 DEVICE — MESH VENTRALEX ST W/STRAP - 8.0CM LARGE (1EA/CA): Type: IMPLANTABLE DEVICE | Status: FUNCTIONAL

## 2019-06-27 RX ORDER — SODIUM CHLORIDE 9 MG/ML
INJECTION, SOLUTION INTRAVENOUS CONTINUOUS
Status: DISCONTINUED | OUTPATIENT
Start: 2019-06-27 | End: 2019-06-28

## 2019-06-27 RX ORDER — LIDOCAINE HYDROCHLORIDE 10 MG/ML
INJECTION, SOLUTION INFILTRATION; PERINEURAL
Status: COMPLETED
Start: 2019-06-27 | End: 2019-06-27

## 2019-06-27 RX ORDER — KETOROLAC TROMETHAMINE 30 MG/ML
INJECTION, SOLUTION INTRAMUSCULAR; INTRAVENOUS PRN
Status: DISCONTINUED | OUTPATIENT
Start: 2019-06-27 | End: 2019-06-27 | Stop reason: SURG

## 2019-06-27 RX ORDER — DIPHENHYDRAMINE HYDROCHLORIDE 50 MG/ML
12.5 INJECTION INTRAMUSCULAR; INTRAVENOUS
Status: DISCONTINUED | OUTPATIENT
Start: 2019-06-27 | End: 2019-06-27 | Stop reason: HOSPADM

## 2019-06-27 RX ORDER — HALOPERIDOL 5 MG/ML
1 INJECTION INTRAMUSCULAR
Status: DISCONTINUED | OUTPATIENT
Start: 2019-06-27 | End: 2019-06-27 | Stop reason: HOSPADM

## 2019-06-27 RX ORDER — POLYETHYLENE GLYCOL 3350 17 G/17G
1 POWDER, FOR SOLUTION ORAL 2 TIMES DAILY
Status: DISCONTINUED | OUTPATIENT
Start: 2019-06-27 | End: 2019-06-28 | Stop reason: HOSPADM

## 2019-06-27 RX ORDER — MORPHINE SULFATE 4 MG/ML
4 INJECTION, SOLUTION INTRAMUSCULAR; INTRAVENOUS
Status: DISCONTINUED | OUTPATIENT
Start: 2019-06-27 | End: 2019-06-28 | Stop reason: HOSPADM

## 2019-06-27 RX ORDER — OXCARBAZEPINE 300 MG/1
300 TABLET, FILM COATED ORAL 2 TIMES DAILY
Status: DISCONTINUED | OUTPATIENT
Start: 2019-06-27 | End: 2019-06-28 | Stop reason: HOSPADM

## 2019-06-27 RX ORDER — DOCUSATE SODIUM 100 MG/1
100 CAPSULE, LIQUID FILLED ORAL 2 TIMES DAILY
Status: DISCONTINUED | OUTPATIENT
Start: 2019-06-27 | End: 2019-06-28 | Stop reason: HOSPADM

## 2019-06-27 RX ORDER — OXYCODONE HYDROCHLORIDE 5 MG/1
5 TABLET ORAL
Status: DISCONTINUED | OUTPATIENT
Start: 2019-06-27 | End: 2019-06-28 | Stop reason: HOSPADM

## 2019-06-27 RX ORDER — GLYCOPYRROLATE 0.2 MG/ML
INJECTION INTRAMUSCULAR; INTRAVENOUS
Status: COMPLETED
Start: 2019-06-27 | End: 2019-06-27

## 2019-06-27 RX ORDER — MEPERIDINE HYDROCHLORIDE 25 MG/ML
6.25 INJECTION INTRAMUSCULAR; INTRAVENOUS; SUBCUTANEOUS
Status: DISCONTINUED | OUTPATIENT
Start: 2019-06-27 | End: 2019-06-27 | Stop reason: HOSPADM

## 2019-06-27 RX ORDER — AMOXICILLIN 250 MG
1 CAPSULE ORAL
Status: DISCONTINUED | OUTPATIENT
Start: 2019-06-27 | End: 2019-06-28 | Stop reason: HOSPADM

## 2019-06-27 RX ORDER — SODIUM CHLORIDE, SODIUM LACTATE, POTASSIUM CHLORIDE, CALCIUM CHLORIDE 600; 310; 30; 20 MG/100ML; MG/100ML; MG/100ML; MG/100ML
INJECTION, SOLUTION INTRAVENOUS
Status: DISCONTINUED | OUTPATIENT
Start: 2019-06-27 | End: 2019-06-27 | Stop reason: SURG

## 2019-06-27 RX ORDER — CEFAZOLIN SODIUM 1 G/3ML
INJECTION, POWDER, FOR SOLUTION INTRAMUSCULAR; INTRAVENOUS PRN
Status: DISCONTINUED | OUTPATIENT
Start: 2019-06-27 | End: 2019-06-27 | Stop reason: SURG

## 2019-06-27 RX ORDER — HYDROMORPHONE HYDROCHLORIDE 1 MG/ML
0.1 INJECTION, SOLUTION INTRAMUSCULAR; INTRAVENOUS; SUBCUTANEOUS
Status: DISCONTINUED | OUTPATIENT
Start: 2019-06-27 | End: 2019-06-27 | Stop reason: HOSPADM

## 2019-06-27 RX ORDER — BISACODYL 10 MG
10 SUPPOSITORY, RECTAL RECTAL
Status: DISCONTINUED | OUTPATIENT
Start: 2019-06-27 | End: 2019-06-28 | Stop reason: HOSPADM

## 2019-06-27 RX ORDER — HYDROMORPHONE HYDROCHLORIDE 1 MG/ML
0.2 INJECTION, SOLUTION INTRAMUSCULAR; INTRAVENOUS; SUBCUTANEOUS
Status: DISCONTINUED | OUTPATIENT
Start: 2019-06-27 | End: 2019-06-27 | Stop reason: HOSPADM

## 2019-06-27 RX ORDER — FAMOTIDINE 20 MG/1
20 TABLET, FILM COATED ORAL 2 TIMES DAILY
Status: DISCONTINUED | OUTPATIENT
Start: 2019-06-27 | End: 2019-06-28 | Stop reason: HOSPADM

## 2019-06-27 RX ORDER — LABETALOL HYDROCHLORIDE 5 MG/ML
5 INJECTION, SOLUTION INTRAVENOUS
Status: DISCONTINUED | OUTPATIENT
Start: 2019-06-27 | End: 2019-06-27 | Stop reason: HOSPADM

## 2019-06-27 RX ORDER — ENEMA 19; 7 G/133ML; G/133ML
1 ENEMA RECTAL
Status: DISCONTINUED | OUTPATIENT
Start: 2019-06-27 | End: 2019-06-28 | Stop reason: HOSPADM

## 2019-06-27 RX ORDER — OXYCODONE HYDROCHLORIDE 5 MG/1
5 TABLET ORAL EVERY 4 HOURS PRN
Status: DISCONTINUED | OUTPATIENT
Start: 2019-06-27 | End: 2019-06-28

## 2019-06-27 RX ORDER — HYDRALAZINE HYDROCHLORIDE 20 MG/ML
5 INJECTION INTRAMUSCULAR; INTRAVENOUS
Status: DISCONTINUED | OUTPATIENT
Start: 2019-06-27 | End: 2019-06-27 | Stop reason: HOSPADM

## 2019-06-27 RX ORDER — ONDANSETRON 2 MG/ML
4 INJECTION INTRAMUSCULAR; INTRAVENOUS EVERY 4 HOURS PRN
Status: DISCONTINUED | OUTPATIENT
Start: 2019-06-27 | End: 2019-06-28 | Stop reason: HOSPADM

## 2019-06-27 RX ORDER — AMOXICILLIN 250 MG
1 CAPSULE ORAL NIGHTLY
Status: DISCONTINUED | OUTPATIENT
Start: 2019-06-27 | End: 2019-06-28 | Stop reason: HOSPADM

## 2019-06-27 RX ORDER — DEXAMETHASONE SODIUM PHOSPHATE 4 MG/ML
INJECTION, SOLUTION INTRA-ARTICULAR; INTRALESIONAL; INTRAMUSCULAR; INTRAVENOUS; SOFT TISSUE PRN
Status: DISCONTINUED | OUTPATIENT
Start: 2019-06-27 | End: 2019-06-27 | Stop reason: SURG

## 2019-06-27 RX ORDER — ONDANSETRON 2 MG/ML
4 INJECTION INTRAMUSCULAR; INTRAVENOUS
Status: COMPLETED | OUTPATIENT
Start: 2019-06-27 | End: 2019-06-27

## 2019-06-27 RX ORDER — HYDROMORPHONE HYDROCHLORIDE 1 MG/ML
0.4 INJECTION, SOLUTION INTRAMUSCULAR; INTRAVENOUS; SUBCUTANEOUS
Status: DISCONTINUED | OUTPATIENT
Start: 2019-06-27 | End: 2019-06-27 | Stop reason: HOSPADM

## 2019-06-27 RX ORDER — BUPIVACAINE HYDROCHLORIDE 2.5 MG/ML
INJECTION, SOLUTION EPIDURAL; INFILTRATION; INTRACAUDAL
Status: DISCONTINUED | OUTPATIENT
Start: 2019-06-27 | End: 2019-06-27 | Stop reason: HOSPADM

## 2019-06-27 RX ORDER — GLYCOPYRROLATE 0.2 MG/ML
0.2 INJECTION INTRAMUSCULAR; INTRAVENOUS ONCE
Status: COMPLETED | OUTPATIENT
Start: 2019-06-27 | End: 2019-06-27

## 2019-06-27 RX ORDER — SODIUM CHLORIDE, SODIUM LACTATE, POTASSIUM CHLORIDE, CALCIUM CHLORIDE 600; 310; 30; 20 MG/100ML; MG/100ML; MG/100ML; MG/100ML
500 INJECTION, SOLUTION INTRAVENOUS ONCE
Status: COMPLETED | OUTPATIENT
Start: 2019-06-27 | End: 2019-06-28

## 2019-06-27 RX ORDER — SODIUM CHLORIDE, SODIUM LACTATE, POTASSIUM CHLORIDE, CALCIUM CHLORIDE 600; 310; 30; 20 MG/100ML; MG/100ML; MG/100ML; MG/100ML
INJECTION, SOLUTION INTRAVENOUS CONTINUOUS
Status: DISCONTINUED | OUTPATIENT
Start: 2019-06-27 | End: 2019-06-28 | Stop reason: HOSPADM

## 2019-06-27 RX ORDER — ATORVASTATIN CALCIUM 40 MG/1
40 TABLET, FILM COATED ORAL EVERY EVENING
Status: DISCONTINUED | OUTPATIENT
Start: 2019-06-27 | End: 2019-06-28 | Stop reason: HOSPADM

## 2019-06-27 RX ORDER — OXYCODONE HYDROCHLORIDE 5 MG/1
10 TABLET ORAL
Status: DISCONTINUED | OUTPATIENT
Start: 2019-06-27 | End: 2019-06-28 | Stop reason: HOSPADM

## 2019-06-27 RX ORDER — SODIUM CHLORIDE, SODIUM LACTATE, POTASSIUM CHLORIDE, CALCIUM CHLORIDE 600; 310; 30; 20 MG/100ML; MG/100ML; MG/100ML; MG/100ML
INJECTION, SOLUTION INTRAVENOUS CONTINUOUS
Status: DISCONTINUED | OUTPATIENT
Start: 2019-06-27 | End: 2019-06-27

## 2019-06-27 RX ORDER — SODIUM CHLORIDE, SODIUM LACTATE, POTASSIUM CHLORIDE, CALCIUM CHLORIDE 600; 310; 30; 20 MG/100ML; MG/100ML; MG/100ML; MG/100ML
INJECTION, SOLUTION INTRAVENOUS CONTINUOUS
Status: DISCONTINUED | OUTPATIENT
Start: 2019-06-27 | End: 2019-06-27 | Stop reason: HOSPADM

## 2019-06-27 RX ORDER — ONDANSETRON 2 MG/ML
INJECTION INTRAMUSCULAR; INTRAVENOUS PRN
Status: DISCONTINUED | OUTPATIENT
Start: 2019-06-27 | End: 2019-06-27 | Stop reason: SURG

## 2019-06-27 RX ADMIN — FENTANYL CITRATE 50 MCG: 50 INJECTION, SOLUTION INTRAMUSCULAR; INTRAVENOUS at 14:45

## 2019-06-27 RX ADMIN — HYDROCODONE BITARTRATE AND ACETAMINOPHEN 30 ML: 7.5; 325 SOLUTION ORAL at 15:19

## 2019-06-27 RX ADMIN — SODIUM CHLORIDE, POTASSIUM CHLORIDE, SODIUM LACTATE AND CALCIUM CHLORIDE: 600; 310; 30; 20 INJECTION, SOLUTION INTRAVENOUS at 12:58

## 2019-06-27 RX ADMIN — HALOPERIDOL LACTATE 1 MG: 5 INJECTION, SOLUTION INTRAMUSCULAR at 15:14

## 2019-06-27 RX ADMIN — KETOROLAC TROMETHAMINE 30 MG: 30 INJECTION, SOLUTION INTRAMUSCULAR at 14:40

## 2019-06-27 RX ADMIN — CEFAZOLIN 2 G: 330 INJECTION, POWDER, FOR SOLUTION INTRAMUSCULAR; INTRAVENOUS at 14:20

## 2019-06-27 RX ADMIN — ONDANSETRON 4 MG: 2 INJECTION INTRAMUSCULAR; INTRAVENOUS at 14:40

## 2019-06-27 RX ADMIN — GLYCOPYRROLATE 0.2 MG: 0.2 INJECTION INTRAMUSCULAR; INTRAVENOUS at 19:18

## 2019-06-27 RX ADMIN — SODIUM CHLORIDE, POTASSIUM CHLORIDE, SODIUM LACTATE AND CALCIUM CHLORIDE: 600; 310; 30; 20 INJECTION, SOLUTION INTRAVENOUS at 14:07

## 2019-06-27 RX ADMIN — DEXAMETHASONE SODIUM PHOSPHATE 4 MG: 4 INJECTION, SOLUTION INTRA-ARTICULAR; INTRALESIONAL; INTRAMUSCULAR; INTRAVENOUS; SOFT TISSUE at 14:20

## 2019-06-27 RX ADMIN — ONDANSETRON 4 MG: 2 INJECTION INTRAMUSCULAR; INTRAVENOUS at 15:09

## 2019-06-27 RX ADMIN — FENTANYL CITRATE 100 MCG: 50 INJECTION, SOLUTION INTRAMUSCULAR; INTRAVENOUS at 14:15

## 2019-06-27 RX ADMIN — FENTANYL CITRATE 50 MCG: 50 INJECTION INTRAMUSCULAR; INTRAVENOUS at 15:14

## 2019-06-27 RX ADMIN — ROCURONIUM BROMIDE 50 MG: 10 INJECTION, SOLUTION INTRAVENOUS at 14:15

## 2019-06-27 RX ADMIN — LIDOCAINE HYDROCHLORIDE 0.3 ML: 10 INJECTION, SOLUTION EPIDURAL; INFILTRATION; INTRACAUDAL; PERINEURAL at 12:59

## 2019-06-27 RX ADMIN — LIDOCAINE HYDROCHLORIDE 100 MG: 20 INJECTION, SOLUTION INTRAVENOUS at 14:15

## 2019-06-27 RX ADMIN — Medication 0.3 ML: at 12:59

## 2019-06-27 RX ADMIN — SODIUM CHLORIDE, POTASSIUM CHLORIDE, SODIUM LACTATE AND CALCIUM CHLORIDE 500 ML: 600; 310; 30; 20 INJECTION, SOLUTION INTRAVENOUS at 18:10

## 2019-06-27 RX ADMIN — PROPOFOL 150 MG: 10 INJECTION, EMULSION INTRAVENOUS at 14:15

## 2019-06-27 RX ADMIN — SUGAMMADEX 300 MG: 100 INJECTION, SOLUTION INTRAVENOUS at 14:40

## 2019-06-27 NOTE — ANESTHESIA TIME REPORT
Anesthesia Start and Stop Event Times     Date Time Event    6/27/2019 1407 Anesthesia Start     1454 Anesthesia Stop        Responsible Staff  06/27/19    Name Role Begin End    Lora Clemente Anesth 1407 1454        Preop Diagnosis (Free Text):  Pre-op Diagnosis     INCISIONAL HERNIA         Preop Diagnosis (Codes):  Diagnosis Information     Diagnosis Code(s):         Post op Diagnosis  Ventral hernia      Premium Reason  Non-Premium    Comments:

## 2019-06-27 NOTE — ANESTHESIA POSTPROCEDURE EVALUATION
Patient: Rubi Urbina    Procedure Summary     Date:  06/27/19 Room / Location:  [facility] 08 / SURGERY [facility]    Anesthesia Start:  1407 Anesthesia Stop:  1454    Procedure:  REPAIR, HERNIA, VENTRAL (Abdomen) Diagnosis:  (INCISIONAL HERNIA )    Surgeon:  Leila Jimenez M.D. Responsible Provider:  Lora Clemente    Anesthesia Type:  general ASA Status:  2          Final Anesthesia Type: general  Last vitals  BP   NIBP: 122/54    Temp   36.3 °C (97.4 °F)    Pulse   Pulse: 64, Heart Rate (Monitored): 64   Resp   15    SpO2   92 %      Anesthesia Post Evaluation    Patient location during evaluation: PACU  Patient participation: complete - patient participated  Level of consciousness: awake and alert    Airway patency: patent  Anesthetic complications: no  Cardiovascular status: hemodynamically stable  Respiratory status: acceptable  Hydration status: euvolemic    PONV: none           Nurse Pain Score: 7 (NPRS)

## 2019-06-27 NOTE — ANESTHESIA PROCEDURE NOTES
Airway  Date/Time: 6/27/2019 2:17 PM  Performed by: BLAKE DOE  Authorized by: BLAKE DOE     Location:  OR  Urgency:  Elective  Indications for Airway Management:  Anesthesia  Spontaneous Ventilation: absent    Sedation Level:  Deep  Preoxygenated: Yes    Patient Position:  Sniffing  Final Airway Type:  Endotracheal airway  Final Endotracheal Airway:  ETT  Cuffed: Yes    Technique Used for Successful ETT Placement:  Direct laryngoscopy  Insertion Site:  Oral  Blade Type:  Vic  Laryngoscope Blade/Videolaryngoscope Blade Size:  3  ETT Size (mm):  7.0  Measured from:  Teeth  ETT to Teeth (cm):  21  Placement Verified by: auscultation and capnometry    Cormack-Lehane Classification:  Grade I - full view of glottis  Number of Attempts at Approach:  1

## 2019-06-27 NOTE — ANESTHESIA PREPROCEDURE EVALUATION
57 y/o female with h/o bowel surgery, CVA, presenting for ventral hernia repair.    Relevant Problems   (+) AVM (arteriovenous malformation)   (+) History of cerebrovascular accident (CVA) with residual deficit   (+) Left hemiplegia (HCC)   (+) S/P partial colectomy   (+) Seizure disorder (HCC)   (+) Smoker      Pertinent Negatives   (-) Difficult intubation   (-) Malignant hyperthermia   (-) PONV (postoperative nausea and vomiting)   (-) Recent URI       Physical Exam    Airway   Mallampati: II  TM distance: >3 FB  Neck ROM: full       Cardiovascular - normal exam  Rhythm: regular  Rate: normal     Dental       Very poor dentition   Pulmonary    Abdominal    Neurological      Other findings: Multiple missing teeth , paraplegia left side            Anesthesia Plan    ASA 2       Plan - general       Airway plan will be ETT        Induction: intravenous          Informed Consent:    Anesthetic plan and risks discussed with patient.

## 2019-06-27 NOTE — OR NURSING
Pt awake and alert. VSS, on room air O2Sat 92%. No complaints of nausea, tolerating fluids. States is pain tolerable 4/10, refusing additional pain medication. Dressing intact.  updated.

## 2019-06-27 NOTE — ADDENDUM NOTE
Addendum  created 06/27/19 1601 by Lora Clemente    Anesthesia Review and Sign - Ready for Procedure

## 2019-06-27 NOTE — OR NURSING
Pt arrived to PACU II at 1610. Pt aa/ox4, pt ambulated from the gurney to the recliner and pt nearly fainted, BP 86/38, HR 45. Oxygen 2L NC applied, legs elevated and pt quickly recovered. /58 hr 50.  at bedside. Call light within reach. Will closely monitor.

## 2019-06-28 VITALS
DIASTOLIC BLOOD PRESSURE: 53 MMHG | WEIGHT: 172.18 LBS | SYSTOLIC BLOOD PRESSURE: 100 MMHG | OXYGEN SATURATION: 96 % | BODY MASS INDEX: 31.68 KG/M2 | HEART RATE: 102 BPM | RESPIRATION RATE: 24 BRPM | TEMPERATURE: 97.8 F | HEIGHT: 62 IN

## 2019-06-28 PROBLEM — I95.9 HYPOTENSION: Status: RESOLVED | Noted: 2019-06-27 | Resolved: 2019-06-28

## 2019-06-28 LAB
ALBUMIN SERPL BCP-MCNC: 3.4 G/DL (ref 3.2–4.9)
ALBUMIN/GLOB SERPL: 1.4 G/DL
ALP SERPL-CCNC: 81 U/L (ref 30–99)
ALT SERPL-CCNC: 12 U/L (ref 2–50)
ANION GAP SERPL CALC-SCNC: 8 MMOL/L (ref 0–11.9)
AST SERPL-CCNC: 13 U/L (ref 12–45)
BASOPHILS # BLD AUTO: 0.4 % (ref 0–1.8)
BASOPHILS # BLD: 0.05 K/UL (ref 0–0.12)
BILIRUB SERPL-MCNC: 0.4 MG/DL (ref 0.1–1.5)
BUN SERPL-MCNC: 12 MG/DL (ref 8–22)
CALCIUM SERPL-MCNC: 8.6 MG/DL (ref 8.5–10.5)
CHLORIDE SERPL-SCNC: 109 MMOL/L (ref 96–112)
CO2 SERPL-SCNC: 24 MMOL/L (ref 20–33)
CREAT SERPL-MCNC: 0.72 MG/DL (ref 0.5–1.4)
EOSINOPHIL # BLD AUTO: 0.03 K/UL (ref 0–0.51)
EOSINOPHIL NFR BLD: 0.3 % (ref 0–6.9)
ERYTHROCYTE [DISTWIDTH] IN BLOOD BY AUTOMATED COUNT: 44 FL (ref 35.9–50)
GLOBULIN SER CALC-MCNC: 2.4 G/DL (ref 1.9–3.5)
GLUCOSE SERPL-MCNC: 118 MG/DL (ref 65–99)
HCT VFR BLD AUTO: 38.1 % (ref 37–47)
HGB BLD-MCNC: 12.1 G/DL (ref 12–16)
IMM GRANULOCYTES # BLD AUTO: 0.05 K/UL (ref 0–0.11)
IMM GRANULOCYTES NFR BLD AUTO: 0.4 % (ref 0–0.9)
LYMPHOCYTES # BLD AUTO: 1.67 K/UL (ref 1–4.8)
LYMPHOCYTES NFR BLD: 14.5 % (ref 22–41)
MCH RBC QN AUTO: 27.6 PG (ref 27–33)
MCHC RBC AUTO-ENTMCNC: 31.8 G/DL (ref 33.6–35)
MCV RBC AUTO: 86.8 FL (ref 81.4–97.8)
MONOCYTES # BLD AUTO: 0.82 K/UL (ref 0–0.85)
MONOCYTES NFR BLD AUTO: 7.1 % (ref 0–13.4)
NEUTROPHILS # BLD AUTO: 8.9 K/UL (ref 2–7.15)
NEUTROPHILS NFR BLD: 77.3 % (ref 44–72)
NRBC # BLD AUTO: 0 K/UL
NRBC BLD-RTO: 0 /100 WBC
PLATELET # BLD AUTO: 245 K/UL (ref 164–446)
PMV BLD AUTO: 8.8 FL (ref 9–12.9)
POTASSIUM SERPL-SCNC: 4.3 MMOL/L (ref 3.6–5.5)
PROT SERPL-MCNC: 5.8 G/DL (ref 6–8.2)
RBC # BLD AUTO: 4.39 M/UL (ref 4.2–5.4)
SODIUM SERPL-SCNC: 141 MMOL/L (ref 135–145)
TROPONIN I SERPL-MCNC: <0.01 NG/ML (ref 0–0.04)
WBC # BLD AUTO: 11.5 K/UL (ref 4.8–10.8)

## 2019-06-28 PROCEDURE — 99231 SBSQ HOSP IP/OBS SF/LOW 25: CPT | Performed by: SURGERY

## 2019-06-28 PROCEDURE — 700102 HCHG RX REV CODE 250 W/ 637 OVERRIDE(OP): Performed by: SURGERY

## 2019-06-28 PROCEDURE — 80053 COMPREHEN METABOLIC PANEL: CPT

## 2019-06-28 PROCEDURE — 84484 ASSAY OF TROPONIN QUANT: CPT

## 2019-06-28 PROCEDURE — 85025 COMPLETE CBC W/AUTO DIFF WBC: CPT

## 2019-06-28 PROCEDURE — A9270 NON-COVERED ITEM OR SERVICE: HCPCS | Performed by: SURGERY

## 2019-06-28 RX ADMIN — OXCARBAZEPINE 300 MG: 300 TABLET, FILM COATED ORAL at 00:28

## 2019-06-28 RX ADMIN — FAMOTIDINE 20 MG: 20 TABLET, FILM COATED ORAL at 00:28

## 2019-06-28 RX ADMIN — FAMOTIDINE 20 MG: 20 TABLET, FILM COATED ORAL at 05:52

## 2019-06-28 RX ADMIN — ATORVASTATIN CALCIUM 40 MG: 40 TABLET, FILM COATED ORAL at 00:28

## 2019-06-28 RX ADMIN — OXYCODONE HYDROCHLORIDE 5 MG: 5 TABLET ORAL at 05:52

## 2019-06-28 RX ADMIN — OXCARBAZEPINE 300 MG: 300 TABLET, FILM COATED ORAL at 05:52

## 2019-06-28 ASSESSMENT — ENCOUNTER SYMPTOMS
ABDOMINAL PAIN: 1
VOMITING: 0
FOCAL WEAKNESS: 0
SHORTNESS OF BREATH: 0
DOUBLE VISION: 0
CHILLS: 0
MYALGIAS: 0
NAUSEA: 0
FEVER: 0
DIZZINESS: 0

## 2019-06-28 ASSESSMENT — LIFESTYLE VARIABLES
EVER_SMOKED: YES
ALCOHOL_USE: NO
EVER_SMOKED: YES

## 2019-06-28 ASSESSMENT — COPD QUESTIONNAIRES
DO YOU EVER COUGH UP ANY MUCUS OR PHLEGM?: NO/ONLY WITH OCCASIONAL COLDS OR INFECTIONS
DURING THE PAST 4 WEEKS HOW MUCH DID YOU FEEL SHORT OF BREATH: NONE/LITTLE OF THE TIME
COPD SCREENING SCORE: 3
HAVE YOU SMOKED AT LEAST 100 CIGARETTES IN YOUR ENTIRE LIFE: YES

## 2019-06-28 ASSESSMENT — PATIENT HEALTH QUESTIONNAIRE - PHQ9
1. LITTLE INTEREST OR PLEASURE IN DOING THINGS: NOT AT ALL
2. FEELING DOWN, DEPRESSED, IRRITABLE, OR HOPELESS: NOT AT ALL
SUM OF ALL RESPONSES TO PHQ9 QUESTIONS 1 AND 2: 0

## 2019-06-28 NOTE — CARE PLAN
Problem: Bowel/Gastric:  Goal: Normal bowel function is maintained or improved  Outcome: PROGRESSING SLOWER THAN EXPECTED  Normoactive bowel sounds x4. Patient has tenderness around abdominal incision site but no complaints of severe pain or nausea. Diet ordered, patient tolerating PO liquids.    Problem: Skin Integrity  Goal: Risk for impaired skin integrity will decrease  Outcome: PROGRESSING SLOWER THAN EXPECTED  Patient able to reposition self, pillows used to help reposition. Appropriate preventative measures in place

## 2019-06-28 NOTE — ASSESSMENT & PLAN NOTE
Incarcerated ventral hernia with colon  6/27 - ventral hernia repair with mesh  Leila Jimenez MD.  General surgery.

## 2019-06-28 NOTE — CARE PLAN
Problem: Communication  Goal: The ability to communicate needs accurately and effectively will improve    Intervention: [place] patient and significant other/support system to call light to alert staff of needs  Patient updated on POC.  All questions answered.       Problem: Skin Integrity  Goal: Risk for impaired skin integrity will decrease    Intervention: Assess risk factors for impaired skin integrity and/or pressure ulcers  Skin assessed.  Patient turned [id number].  Pillows in use for support

## 2019-06-28 NOTE — PROGRESS NOTES
Patient arrived to [facility] on monitor. Patient awake and alert, vital signs stable. Dr. Norris at bedside to assess patient.    2 RN skin check complete with Smitha BATISTA RN.  Devices in place SCDs, BP cuff, nasal cannula, ekg leads, pulse oximeter.   Skin assessed under the following devices listed above.   Preventative measures in place including Mepilex to sacrum, pillows for repositioning, silicone nasal cannula tubing.  Following areas of concern:   ·Midline abdominal incision, tegaderm and gauze present CDI.   -Old scar tissue to abdomen patient states is from a previous abdominal surgery.       Wound consult placedYES/NO: no    Wound reported YES/NO: no  Appropriate LDAs opened YES/NO: N\A

## 2019-06-28 NOTE — PROGRESS NOTES
Patient discharged.  Explain in detail all discharge instructions and answered all questions.  Patient wheeled out of unit with daughters, discharge instructions, prescription x 1, and belongings.  Patient refused hospital escort.

## 2019-06-28 NOTE — PROGRESS NOTES
Patient ambulated approximately 30 feet with this RN.  Minimal hand held assistance required.  94 percent room air. See vital signs for blood pressure prior and after mobilization.

## 2019-06-28 NOTE — PROGRESS NOTES
Trauma / Surgical Daily Progress Note    Date of Service  6/28/2019    Chief Complaint  58 y.o. female admitted 6/27/2019 with INCISIONAL HERNIA     Interval Events  POD #1 ventral hernia repair  Hypotension resolved  Labs reassuring  Ambulating well    - Discharge home    Review of Systems  Review of Systems   Constitutional: Negative for chills and fever.   Eyes: Negative for double vision.   Respiratory: Negative for shortness of breath.    Cardiovascular: Negative for chest pain.   Gastrointestinal: Positive for abdominal pain (minimal, incisional). Negative for nausea and vomiting.   Genitourinary:        Voiding   Musculoskeletal: Negative for myalgias.   Neurological: Negative for dizziness and focal weakness.        Vital Signs  Temp:  [35.2 °C (95.3 °F)-36.7 °C (98.1 °F)] 36.6 °C (97.8 °F)  Pulse:  [] 99  Resp:  [12-48] 22  BP: ()/(43-53) 100/53  SpO2:  [91 %-100 %] 94 %    Physical Exam  Physical Exam   Constitutional: She is oriented to person, place, and time. No distress.   Up in chair   Eyes: Conjunctivae are normal.   Cardiovascular: Normal rate, regular rhythm and intact distal pulses.    Pulmonary/Chest: Effort normal and breath sounds normal. No respiratory distress. She exhibits no tenderness.   Abdominal: Soft. She exhibits no distension. There is tenderness (incisional). There is no guarding.   Midline incision with gauze dressing intact   Musculoskeletal:   Moves all extremities    Neurological: She is alert and oriented to person, place, and time.   Skin: Skin is warm and dry.   Nursing note and vitals reviewed.      Laboratory  Recent Results (from the past 24 hour(s))   CBC WITHOUT DIFFERENTIAL    Collection Time: 06/27/19  6:05 PM   Result Value Ref Range    WBC 16.0 (H) 4.8 - 10.8 K/uL    RBC 5.27 4.20 - 5.40 M/uL    Hemoglobin 14.6 12.0 - 16.0 g/dL    Hematocrit 46.3 37.0 - 47.0 %    MCV 87.9 81.4 - 97.8 fL    MCH 27.7 27.0 - 33.0 pg    MCHC 31.5 (L) 33.6 - 35.0 g/dL    RDW  44.4 35.9 - 50.0 fL    Platelet Count 253 164 - 446 K/uL    MPV 8.5 (L) 9.0 - 12.9 fL   EKG    Collection Time: 19  7:06 PM   Result Value Ref Range    Report       [facility]    Test Date:  2019  Pt Name:    ASHLEY BERNARDO               Department: RS4  MRN:        [id number]                      Room:       [facility]  Gender:     Female                       Technician: [facility]  :        1960                   Requested By:AAMIR KHAN  Order #:    [id number]                    Reading MD: Kayleigh Casanova    Measurements  Intervals                                Axis  Rate:       53                           P:          70  IL:         136                          QRS:        47  QRSD:       76                           T:          47  QT:         520  QTc:        489    Interpretive Statements  SINUS BRADYCARDIA  BORDERLINE PROLONGED QT INTERVAL    Electronically Signed On 2019 21:10:49 PDT by Kayeligh Casanova     COMP METABOLIC PANEL    Collection Time: 19  7:11 PM   Result Value Ref Range    Sodium 140 135 - 145 mmol/L    Potassium 4.2 3.6 - 5.5 mmol/L    Chloride 108 96 - 112 mmol/L    Co2 24 20 - 33 mmol/L    Anion Gap 8.0 0.0 - 11.9    Glucose 175 (H) 65 - 99 mg/dL    Bun 15 8 - 22 mg/dL    Creatinine 0.89 0.50 - 1.40 mg/dL    Calcium 8.3 (L) 8.5 - 10.5 mg/dL    AST(SGOT) 13 12 - 45 U/L    ALT(SGPT) 13 2 - 50 U/L    Alkaline Phosphatase 88 30 - 99 U/L    Total Bilirubin 0.4 0.1 - 1.5 mg/dL    Albumin 3.3 3.2 - 4.9 g/dL    Total Protein 5.8 (L) 6.0 - 8.2 g/dL    Globulin 2.5 1.9 - 3.5 g/dL    A-G Ratio 1.3 g/dL   ESTIMATED GFR    Collection Time: 19  7:11 PM   Result Value Ref Range    GFR If African American >60 >60 mL/min/1.73 m 2    GFR If Non African American >60 >60 mL/min/1.73 m 2   CBC WITHOUT DIFFERENTIAL    Collection Time: 19  7:25 PM   Result Value Ref Range    WBC 15.0 (H) 4.8 - 10.8 K/uL    RBC 4.46 4.20 - 5.40 M/uL    Hemoglobin 12.3 12.0 - 16.0 g/dL     Hematocrit 39.8 37.0 - 47.0 %    MCV 89.2 81.4 - 97.8 fL    MCH 27.6 27.0 - 33.0 pg    MCHC 30.9 (L) 33.6 - 35.0 g/dL    RDW 45.8 35.9 - 50.0 fL    Platelet Count 254 164 - 446 K/uL    MPV 8.7 (L) 9.0 - 12.9 fL   Hemoglobin - STAT    Collection Time: 06/27/19  9:46 PM   Result Value Ref Range    Hemoglobin 12.5 12.0 - 16.0 g/dL   TROPONIN    Collection Time: 06/27/19  9:46 PM   Result Value Ref Range    Troponin I <0.01 0.00 - 0.04 ng/mL   TROPONIN    Collection Time: 06/28/19  3:30 AM   Result Value Ref Range    Troponin I <0.01 0.00 - 0.04 ng/mL   CBC with Differential: Tomorrow AM    Collection Time: 06/28/19  3:30 AM   Result Value Ref Range    WBC 11.5 (H) 4.8 - 10.8 K/uL    RBC 4.39 4.20 - 5.40 M/uL    Hemoglobin 12.1 12.0 - 16.0 g/dL    Hematocrit 38.1 37.0 - 47.0 %    MCV 86.8 81.4 - 97.8 fL    MCH 27.6 27.0 - 33.0 pg    MCHC 31.8 (L) 33.6 - 35.0 g/dL    RDW 44.0 35.9 - 50.0 fL    Platelet Count 245 164 - 446 K/uL    MPV 8.8 (L) 9.0 - 12.9 fL    Neutrophils-Polys 77.30 (H) 44.00 - 72.00 %    Lymphocytes 14.50 (L) 22.00 - 41.00 %    Monocytes 7.10 0.00 - 13.40 %    Eosinophils 0.30 0.00 - 6.90 %    Basophils 0.40 0.00 - 1.80 %    Immature Granulocytes 0.40 0.00 - 0.90 %    Nucleated RBC 0.00 /100 WBC    Neutrophils (Absolute) 8.90 (H) 2.00 - 7.15 K/uL    Lymphs (Absolute) 1.67 1.00 - 4.80 K/uL    Monos (Absolute) 0.82 0.00 - 0.85 K/uL    Eos (Absolute) 0.03 0.00 - 0.51 K/uL    Baso (Absolute) 0.05 0.00 - 0.12 K/uL    Immature Granulocytes (abs) 0.05 0.00 - 0.11 K/uL    NRBC (Absolute) 0.00 K/uL   Comp Metabolic Panel (CMP): Tomorrow AM    Collection Time: 06/28/19  3:30 AM   Result Value Ref Range    Sodium 141 135 - 145 mmol/L    Potassium 4.3 3.6 - 5.5 mmol/L    Chloride 109 96 - 112 mmol/L    Co2 24 20 - 33 mmol/L    Anion Gap 8.0 0.0 - 11.9    Glucose 118 (H) 65 - 99 mg/dL    Bun 12 8 - 22 mg/dL    Creatinine 0.72 0.50 - 1.40 mg/dL    Calcium 8.6 8.5 - 10.5 mg/dL    AST(SGOT) 13 12 - 45 U/L     ALT(SGPT) 12 2 - 50 U/L    Alkaline Phosphatase 81 30 - 99 U/L    Total Bilirubin 0.4 0.1 - 1.5 mg/dL    Albumin 3.4 3.2 - 4.9 g/dL    Total Protein 5.8 (L) 6.0 - 8.2 g/dL    Globulin 2.4 1.9 - 3.5 g/dL    A-G Ratio 1.4 g/dL   ESTIMATED GFR    Collection Time: 06/28/19  3:30 AM   Result Value Ref Range    GFR If African American >60 >60 mL/min/1.73 m 2    GFR If Non African American >60 >60 mL/min/1.73 m 2       Fluids    Intake/Output Summary (Last 24 hours) at 06/28/19 1153  Last data filed at 06/28/19 1000   Gross per 24 hour   Intake             4920 ml   Output             1600 ml   Net             3320 ml       Core Measures & Quality Metrics  Labs reviewed, Medications reviewed and Radiology images reviewed  Corbett catheter: No Corbett      DVT Prophylaxis: Not indicated at this time, ambulatory  DVT prophylaxis - mechanical: SCDs  Ulcer prophylaxis: Yes        Total Score: 0    ETOH Screening    Assessment/Plan  Hypotension   Assessment & Plan    Hypotension with mobilization  Hg 14 to 12 with 2 liters fluid  EKG - bradycardia  Abdomen benign  Hemoglobin stable  Troponin negative      Incarcerated ventral hernia- (present on admission)   Assessment & Plan    Incarcerated ventral hernia with colon  6/27 - ventral hernia repair with mesh  Leila Jimenez MD.  General surgery.         Discussed patient condition with Family, RN, Patient and general surgery, Dr. Joel.    Seen  Doing well  Ok to discharge home   Discussed with RN, RT, pharmacy and Shannon Joel MD

## 2019-06-28 NOTE — PROGRESS NOTES
Trauma / Surgical Daily Progress Note    Date of Service  6/27/2019    Chief Complaint  58 y.o. female admitted 6/27/2019 with INCISIONAL HERNIA     Interval Events  New consult - 58 year old female s/p elective ventral hernia repair today. Significant hypotension post operatively when up and out of bed. Initial attempts at resuscitation with crystalloid. Transferred to ICU for 2nd bout of hypotension with standing.      Review of Systems  Review of Systems     Vital Signs for last 24 hours  Temp:  [35.2 °C (95.3 °F)-36.7 °C (98.1 °F)] 36.4 °C (97.6 °F)  Pulse:  [45-68] 66  Resp:  [12-29] 14  BP: ()/(43-53) 100/53  SpO2:  [92 %-100 %] 92 %    Hemodynamic parameters for last 24 hours       Respiratory Data     Respiration: 14, Pulse Oximetry: 92 %             Physical Exam  Physical Exam   Constitutional: She is oriented to person, place, and time. She appears well-developed and well-nourished. No distress.   HENT:   Head: Normocephalic and atraumatic.   Eyes: Pupils are equal, round, and reactive to light. EOM are normal.   Neck: Neck supple. No tracheal deviation present.   Cardiovascular: Normal rate and regular rhythm.    Pulmonary/Chest: Effort normal and breath sounds normal.   Abdominal: Soft.   Dressing c/d/i   Musculoskeletal: Normal range of motion. She exhibits no edema.   Neurological: She is alert and oriented to person, place, and time.   Skin: Skin is warm and dry.   Psychiatric: She has a normal mood and affect. Her behavior is normal.   Nursing note and vitals reviewed.      Laboratory  Recent Results (from the past 24 hour(s))   CBC WITHOUT DIFFERENTIAL    Collection Time: 06/27/19  6:05 PM   Result Value Ref Range    WBC 16.0 (H) 4.8 - 10.8 K/uL    RBC 5.27 4.20 - 5.40 M/uL    Hemoglobin 14.6 12.0 - 16.0 g/dL    Hematocrit 46.3 37.0 - 47.0 %    MCV 87.9 81.4 - 97.8 fL    MCH 27.7 27.0 - 33.0 pg    MCHC 31.5 (L) 33.6 - 35.0 g/dL    RDW 44.4 35.9 - 50.0 fL    Platelet Count 253 164 - 446 K/uL     MPV 8.5 (L) 9.0 - 12.9 fL   EKG    Collection Time: 19  7:06 PM   Result Value Ref Range    Report       [facility]    Test Date:  2019  Pt Name:    ASHLEY BERNARDO               Department: RS4  MRN:        [id number]                      Room:       [facility]  Gender:     Female                       Technician: [facility]  :        1960                   Requested By:AAMIR KHAN  Order #:    [id number]                    Reading MD: Kayleigh Casanova    Measurements  Intervals                                Axis  Rate:       53                           P:          70  TX:         136                          QRS:        47  QRSD:       76                           T:          47  QT:         520  QTc:        489    Interpretive Statements  SINUS BRADYCARDIA  BORDERLINE PROLONGED QT INTERVAL    Electronically Signed On 2019 21:10:49 PDT by Kayleigh Casanova     COMP METABOLIC PANEL    Collection Time: 19  7:11 PM   Result Value Ref Range    Sodium 140 135 - 145 mmol/L    Potassium 4.2 3.6 - 5.5 mmol/L    Chloride 108 96 - 112 mmol/L    Co2 24 20 - 33 mmol/L    Anion Gap 8.0 0.0 - 11.9    Glucose 175 (H) 65 - 99 mg/dL    Bun 15 8 - 22 mg/dL    Creatinine 0.89 0.50 - 1.40 mg/dL    Calcium 8.3 (L) 8.5 - 10.5 mg/dL    AST(SGOT) 13 12 - 45 U/L    ALT(SGPT) 13 2 - 50 U/L    Alkaline Phosphatase 88 30 - 99 U/L    Total Bilirubin 0.4 0.1 - 1.5 mg/dL    Albumin 3.3 3.2 - 4.9 g/dL    Total Protein 5.8 (L) 6.0 - 8.2 g/dL    Globulin 2.5 1.9 - 3.5 g/dL    A-G Ratio 1.3 g/dL   ESTIMATED GFR    Collection Time: 19  7:11 PM   Result Value Ref Range    GFR If African American >60 >60 mL/min/1.73 m 2    GFR If Non African American >60 >60 mL/min/1.73 m 2   CBC WITHOUT DIFFERENTIAL    Collection Time: 19  7:25 PM   Result Value Ref Range    WBC 15.0 (H) 4.8 - 10.8 K/uL    RBC 4.46 4.20 - 5.40 M/uL    Hemoglobin 12.3 12.0 - 16.0 g/dL    Hematocrit 39.8 37.0 - 47.0 %    MCV 89.2 81.4 - 97.8  fL    MCH 27.6 27.0 - 33.0 pg    MCHC 30.9 (L) 33.6 - 35.0 g/dL    RDW 45.8 35.9 - 50.0 fL    Platelet Count 254 164 - 446 K/uL    MPV 8.7 (L) 9.0 - 12.9 fL   Hemoglobin - STAT    Collection Time: 06/27/19  9:46 PM   Result Value Ref Range    Hemoglobin 12.5 12.0 - 16.0 g/dL   TROPONIN    Collection Time: 06/27/19  9:46 PM   Result Value Ref Range    Troponin I <0.01 0.00 - 0.04 ng/mL       Fluids    Intake/Output Summary (Last 24 hours) at 06/27/19 2343  Last data filed at 06/27/19 2152   Gross per 24 hour   Intake             3120 ml   Output                0 ml   Net             3120 ml       Core Measures & Quality Metrics  Labs reviewed, Medications reviewed and Radiology images reviewed  Corbett catheter: No Corbett      DVT Prophylaxis: Contraindicated - High bleeding risk            IVIS Score  ETOH Screening    Assessment/Plan  Hypotension   Assessment & Plan    Hypotension with mobilization  Hg 14 to 12 with 2 liters fluid  EKG -bradycardia  Abd benign  Serial hg ordered  Serial trop ordered     Incarcerated ventral hernia- (present on admission)   Assessment & Plan    Incarcerated ventral hernia with colon  6/27 - VH repair with mesh       Plan:  Gentle resuscitation  Trial OOB in am    Discussed patient condition with RN and Dr. Jimenez.    Critical care time spent exclusive of procedures: 40 minutes.

## 2019-06-28 NOTE — OR NURSING
Dr. Jimenez paged due to pt's VS, orders received for cbc and LR 500ml. Will notify MD with results.

## 2019-06-28 NOTE — PROGRESS NOTES
Pt seen and examined  Admitted after VH repair due to hypotension/vagal episode  Hg stable  Abd benign  Feels better  Reg diet, ambulate  Ok to DC if tolerates.

## 2019-06-28 NOTE — OR NURSING
Pt tolerated sitting up in the recliner from 2725-2329. Pt assisted to sit up in the wheelchair but pt start passing-out again. Pt then assisted, max lift to the gurney and rapid response called. bp low. Dr. Jimenez updated and ordered another Liter of LR and cbc. Will notify Dr. Jimenez with cbc results. Report given to Terry and Néstor in PACU.

## 2019-06-28 NOTE — OR NURSING
Pt arrived back to PACU from Phase II. Received bedside report from ELINOR Palm. Awaiting results of CBC that was drawn in Phase II and pt in the middle of receiving a liter of LR. Pending orders from MD. Pt A&OX4. VSS. Pt on 1 L of oxygen via nasal cannula.

## 2019-06-28 NOTE — OP REPORT
DATE OF SERVICE:  06/27/2019    PREOPERATIVE DIAGNOSIS:  Incarcerated ventral hernia.    POSTOPERATIVE DIAGNOSIS:  Incarcerated ventral hernia.    PROCEDURE:  Ventral herniorrhaphy with mesh.    SURGEON:  Leila Khan MD    ASSISTANT:  MARELY Isaacs    ANESTHESIA:  General endotracheal.    ANESTHESIOLOGIST:  Dr. Clemente.    INDICATIONS:  The patient is a 58-year-old female who had a previous   colectomy, colostomy and colostomy closure.  Subsequently developed a ventral   hernia in the lower aspect of her incision, she is being brought at this time   now for repair of this.    FINDINGS:  Incarcerated sigmoid colon within the wound.  This was reduced back   to the abdominal cavity.  A mesh repair was performed.    DESCRIPTION OF PROCEDURE:  After the patient was identified and consented, she   was brought to the operating room and placed in supine position.  The patient   underwent general endotracheal anesthetic clearance.  The patient's abdomen   was prepped and draped in sterile fashion.  The lower portion of her midline   incision was reopened using electrocautery.  Subcutaneous tissue was dissected   down to the sac.  The sac was opened.  The sigmoid colon was found within the   sac.  The sac was mobilized.  The bowel was returned to the abdominal cavity   and the fascial defect was fully defined.  A piece of Ventralex mesh was   brought in the field and sewn in circumferentially with interrupted 0   Ethibond.  The fascia was closed over with 0 Ethibond.  Subcutaneous tissues   were approximated with 3-0 Vicryl.  Skin was closed with 4-0 Vicryl stitch.    Steri-Strips and dry dressing placed on the wound.  The patient was extubated   and taken to surgery.  All sponge and needle counts were correct.       ____________________________________     LEILA KHAN MD    [facility]    DD:  06/27/2019 14:44:49  DT:  06/27/2019 17:41:03    D#:  [id number]  Job#:  [id number]    cc: Lora Clemente MD, GLENN WALLACE  APRN

## 2019-06-28 NOTE — OR NURSING
Report called to Sergei ALDRICH. Plan of care discussed. Patient continues to deny pain. Abdomen remains soft, dressing CDI. Patient denies needs at this time. VSS.

## 2019-06-28 NOTE — DISCHARGE INSTRUCTIONS
Discharge Instructions    Discharged to home by car with relative. Discharged via wheelchair, hospital escort: Yes.  Special equipment needed: Not Applicable    Be sure to schedule a follow-up appointment with your primary care doctor or any specialists as instructed.     Discharge Plan:   Diet Plan: Discussed  Activity Level: Discussed  Smoking Cessation Offered: Patient Refused  Confirmed Follow up Appointment: Appointment Scheduled  Confirmed Symptoms Management: Discussed  Medication Reconciliation Updated: Yes  Pneumococcal Vaccine Administered/Refused: Not given - Patient refused pneumococcal vaccine  Influenza Vaccine Indication: Patient Refuses    I understand that a diet low in cholesterol, fat, and sodium is recommended for good health. Unless I have been given specific instructions below for another diet, I accept this instruction as my diet prescription.   Other diet: REGULAR    Special Instructions: None    · Is patient discharged on Warfarin / Coumadin?   No     Depression / Suicide Risk    As you are discharged from this [facility] facility, it is important to learn how to keep safe from harming yourself.    Recognize the warning signs:  · Abrupt changes in personality, positive or negative- including increase in energy   · Giving away possessions  · Change in eating patterns- significant weight changes-  positive or negative  · Change in sleeping patterns- unable to sleep or sleeping all the time   · Unwillingness or inability to communicate  · Depression  · Unusual sadness, discouragement and loneliness  · Talk of wanting to die  · Neglect of personal appearance   · Rebelliousness- reckless behavior  · Withdrawal from people/activities they love  · Confusion- inability to concentrate     If you or a loved one observes any of these behaviors or has concerns about self-harm, here's what you can do:  · Talk about it- your feelings and reasons for harming yourself  · Remove any means that you might  use to hurt yourself (examples: pills, rope, extension cords, firearm)  · Get professional help from the community (Mental Health, Substance Abuse, psychological counseling)  · Do not be alone:Call your Safe Contact- someone whom you trust who will be there for you.  · Call your local CRISIS HOTLINE 693-2038 or 106-032-7887  · Call your local Children's Mobile Crisis Response Team [place] (385) 826-8098 or wwwO-film  · Call the toll free National Suicide Prevention Hotlines   · National Suicide Prevention Lifeline 729-245-ODMP (4748)  · [employer] Hope Line Network 800-SUICIDE (344-7892)      ACTIVITY: Rest and take it easy for the first 24 hours.  A responsible adult is recommended to remain with you during that time.  It is normal to feel sleepy.  We encourage you to not do anything that requires balance, judgment or coordination.    MILD FLU-LIKE SYMPTOMS ARE NORMAL. YOU MAY EXPERIENCE GENERALIZED MUSCLE ACHES, THROAT IRRITATION, HEADACHE AND/OR SOME NAUSEA.    FOR 24 HOURS DO NOT:  Drive, operate machinery or run household appliances.  Drink beer or alcoholic beverages.   Make important decisions or sign legal documents.    SPECIAL INSTRUCTIONS: Hernia Repair Discharge Instructions:    ACTIVITIES: Upon discharge from the hospital, the day of surgery it is requested that you do no significant physical activity and limit mental activities, as you have had sedation. The day after surgery, you may resume activities of daily living, but for four weeks, it is recommended that you do no strenuous activities or heavy lifting (greater than 15 pounds).     DRIVING: You may drive whenever you are off pain medications and are able to perform the activities needed to drive, i.e. turning, bending, twisting, etc.     WOUND: It is not unusual for patients to experience swelling and even bruising at the hernia repair site.      ICE: please use ice on the wound to decrease the swelling for the first 24 hours and  then discontinue.     BATHING: The dressing can be removed two days after surgery and the wound can then be wetted in a shower as normal, but avoid submersion in water (tub bath) for at least 2 weeks.    PAIN MEDICATION: You will be given a prescription for pain medication at discharge. Please take these as directed. It is important to remember not to take medications on an empty stomach as this may cause nausea.     BOWEL FUNCTION: After hernia repair, it is not uncommon for patients to experience constipation. This is due to decreasing activity levels as well as pain medications. You may wish to use a stool softener beginning immediately after surgery, and you may or may not need to use a laxative (Milk of Magnesia, Ex-lax; Senokot, etc.) as well.     CALL IF YOU HAVE: (1) Fevers to more than 1010 F, (2) Unusual chest or leg pain,  (3) Drainage or fluid from incision that may be foul smelling, increased tenderness or soreness at the wound or the wound edges are no longer together,redness or swelling at the incision site. Please do not hesitate to call with any other questions.     APPOINTMENT: Contact our office at [phone] for a follow-up appointment in 2 weeks following your procedure.     If you have any additional questions, please do not hesitate to call the office.      Office address:  [address] [place] [address], [address] [place] [address]      DIET: To avoid nausea, slowly advance diet as tolerated, avoiding spicy or greasy foods for the first day.  Add more substantial food to your diet according to your physician's instructions. INCREASE FLUIDS AND FIBER TO AVOID CONSTIPATION.    SURGICAL DRESSING/BATHING: See above instructions.    FOLLOW-UP APPOINTMENT:  A follow-up appointment should be arranged with your doctor in 1-2 weeks; call to schedule.    You should CALL YOUR PHYSICIAN if you develop:  Fever greater than 101 degrees F.  Pain not relieved by medication, or persistent nausea or vomiting.  Excessive  bleeding (blood soaking through dressing) or unexpected drainage from the wound.  Extreme redness or swelling around the incision site, drainage of pus or foul smelling drainage.  Inability to urinate or empty your bladder within 8 hours.  Problems with breathing or chest pain.    You should call 911 if you develop problems with breathing or chest pain.  If you are unable to contact your doctor or surgical center, you should go to the nearest emergency room or urgent care center.  Physician's telephone #: Dr. Jimenez ([phone])    If any questions arise, call your doctor.  If your doctor is not available, please feel free to call the Surgical Center at [phone].  The Center is open Monday through Friday from 7AM to 7PM.  You can also call the [employer] HOTLINE open 24 hours/day, 7 days/week and speak to a nurse at [phone], or toll free at [phone].    A registered nurse may call you a few days after your surgery to see how you are doing after your procedure.    MEDICATIONS: Resume taking daily medication.  Take prescribed pain medication with food.  If no medication is prescribed, you may take non-aspirin pain medication if needed.  PAIN MEDICATION CAN BE VERY CONSTIPATING.  Take a stool softener or laxative such as senokot, pericolace, or milk of magnesia if needed.    Prescription given.  Last pain medication given at 3:19pm.    If your physician has prescribed pain medication that includes Acetaminophen (Tylenol), do not take additional Acetaminophen (Tylenol) while taking the prescribed medication.    Depression / Suicide Risk    As you are discharged from this [facility] facility, it is important to learn how to keep safe from harming yourself.    Recognize the warning signs:  · Abrupt changes in personality, positive or negative- including increase in energy   · Giving away possessions  · Change in eating patterns- significant weight changes-  positive or negative  · Change in sleeping  patterns- unable to sleep or sleeping all the time   · Unwillingness or inability to communicate  · Depression  · Unusual sadness, discouragement and loneliness  · Talk of wanting to die  · Neglect of personal appearance   · Rebelliousness- reckless behavior  · Withdrawal from people/activities they love  · Confusion- inability to concentrate     If you or a loved one observes any of these behaviors or has concerns about self-harm, here's what you can do:  · Talk about it- your feelings and reasons for harming yourself  · Remove any means that you might use to hurt yourself (examples: pills, rope, extension cords, firearm)  · Get professional help from the community (Mental Health, Substance Abuse, psychological counseling)  · Do not be alone:Call your Safe Contact- someone whom you trust who will be there for you.  · Call your local CRISIS HOTLINE 076-2095 or 280-848-2619  · Call your local Children's Mobile Crisis Response Team [place] (208) 436-1267 or www.[employer]  · Call the toll free National Suicide Prevention Hotlines   · National Suicide Prevention Lifeline 833-648-XRRN (3023)  · National Hope Line Network 800-SUICIDE (235-2716)

## 2019-06-28 NOTE — OR NURSING
Report received from rickie ALDRICH. Plan of care discussed. Family at bedside. Patient states all needs are met. Dressing CDI, site is soft. VSS.

## 2019-06-28 NOTE — ASSESSMENT & PLAN NOTE
Hypotension with mobilization  Hg 14 to 12 with 2 liters fluid  EKG - bradycardia  Abdomen benign  Hemoglobin stable  Troponin negative

## 2019-06-28 NOTE — OR NURSING
Patient to S114 on monitor with Néstor ALDRICH. Belongings on [facility], patient with partial dentures in place.

## 2019-06-28 NOTE — PROGRESS NOTES
ANNUAL WELLNESS VISIT PRE-VISIT PLANNING WITHOUT OUTREACH    1.  Reviewed note from last office visit with PCP: YES    2.  If any orders were placed at last visit, do we have Results/Consult Notes?        •  Labs - Labs ordered, completed on 12/13/18 and results are in chart.  Note: If patient appointment is for lab review and patient did not complete labs, check with provider if OK to reschedule patient until labs completed.       •  Imaging - Imaging was not ordered at last office visit.       •  Referrals - Referral ordered, patient has NOT been seen.    3.  Immunizations were updated in Epic using Web[employer]?: Epic matches WebIZ       •  [employer] Recommendations: SHINGRIX (Shingles)        •  Is patient due for Tdap? NO       •  Is patient due for Shingrix? NO     4.  Patient is due for the following Health Maintenance Topics:   Health Maintenance Due   Topic Date Due   • Annual Wellness Visit  1953   • HEPATITIS C SCREENING  1953   • IMM ZOSTER VACCINES (1 of 2) 12/29/2003   • IMM PNEUMOCOCCAL 65+ (ADULT) LOW/MEDIUM RISK SERIES (1 of 2 - PCV13) 12/29/2018       - Patient has completed FLU and TDAP Immunization(s) per Web[employer]. Chart has been updated.    5.  Reviewed/Updated the following with patient:       •   Preferred Pharmacy? YES       •   Preferred Lab? YES       •   Preferred Communication? YES       •   Allergies? YES       •   Medications? NO       •   Social History? NO       •   Family History (document living status of immediate family members and if + hx of  cancer, diabetes, hypertension, hyperlipidemia, heart attack, stroke) NO    6.  Care Team Updated:       •   DME Company (gait device, O2, CPAP, etc.): NO       •   Other Specialists (eye doctor, derm, GYN, cardiology, endo, etc): NO    7. Orders for overdue Health Maintenance topics pended in Pre-Charting? N\A    8.  Patient has the following Care Path diagnoses on Problem List:  NONE    9.  Patient was advised: “This is a free wellness  Pt SP ventral hernia repair for incarcerated VH  Dropped BP in Stage 2 and vagal  Fluids given and hg 14  Appeared improved and when mobilized became hypotensive again  Repeat hg 12.3  Abd benign  Admit ICU  Serial hg and trops ordered.   visit. The provider will screen for medical conditions to help you stay healthy. If you have other concerns to address you may be asked to discuss these at a separate visit or there may be an additional fee.”     10.  Patient was informed to arrive 15 min prior to their scheduled appointment and bring in their medication bottles.

## 2019-07-02 NOTE — DISCHARGE SUMMARY
Discharge Summary    CHIEF COMPLAINT ON ADMISSION  No chief complaint on file.      Reason for Admission  Incisional hernia without obstruction     Admission Date  6/27/2019    CODE STATUS  Prior    HPI & HOSPITAL COURSE  This is a 58 y.o. female here with an incarcerated ventral hernia and elects surgical resection. The patient post operative coarse was essentially unremarkable. However,  she had significant hypotension post operatively when up and out of bed. Initial attempts at resuscitation with crystalloid and was transferred to ICU for 2nd bout of hypotension with standing.  Serial labs were done, which were stable, and she ultimately the hypotension resolved without further interventions.  At the time of discharge she was afebrile, tolerating a regular diet and voiding normally.  Her general exam is unremarkable.  Her abdominal incision is healing satisfactorily.  Patient has been counseled on normal expectations of an uncomplicated post operative coarse.  She was discharged on a regular diet.  She has restarted her pre-admission medications.  Her discharge medications are as below.   She is to follow up with Dr. Jimenez in one week and prn.  Discharge instructions were given. Precautions and limitations were reviewed.     The patient was counseled regarding the benefits of narcotics for post-operative pain in the short term period. The patient was made aware of the alternatives, including heating pads, ice, and NSAID medication.    The risks of addiction, overdose, respiratory depression, and risks during pregnancy (if applicable) along with warning signs of addiction, were discussed.  We discussed taking the medications as prescribed and how they can interact with other medications the patient is currently taking.     The patient was notified not to share the medications with others and understands that these medications are intended to be used only in the short term for post-operative pain.    I reviewed the  NarcRx  program, and the patient was deemed not to be at high risk for abuse, and had no concurrent prescriptions.    The patient was given a chance to ask questions, and all questions were answered. Discussion was undertaken with Layman's terms. The patient demonstrated adequate understanding. Consent was given and signed preoperatively in clear state of mind.    The patient has stated understanding and agrees with this plan of care.                      Therefore, she is discharged in good and stable condition to home with close outpatient follow-up.      Discharge Date  6/28/2019    FOLLOW UP ITEMS POST DISCHARGE  None    DISCHARGE DIAGNOSES  Incarcerated ventral hernia- (present on admission)   Assessment & Plan    Incarcerated ventral hernia with colon  6/27 - ventral hernia repair with mesh  Leila Jimenez MD.  General surgery.         FOLLOW UP  Future Appointments  Date Time Provider Department Center   9/5/2019 1:00 PM Rudy Perez M.D. [facility] None     Leila Jimenez M.D.  [address] [facility] [address]  [place] [address]  [phone]    On 7/5/2019      Samreen Gregorio A.P.R.[facility]  [address] [facility] [address]  [place] [address]  [phone]            MEDICATIONS ON DISCHARGE     Medication List      CONTINUE taking these medications      Instructions   atorvastatin 40 MG Tabs  Commonly known as:  LIPITOR   Take 1 Tab by mouth every bedtime.  Dose:  40 mg     OXcarbazepine 150 MG Tabs  Commonly known as:  TRILEPTAL   Take 300 mg by mouth 2 times a day.  Dose:  300 mg            Allergies  No Known Allergies    DIET  Regular Diet    ACTIVITY  As tolerated.  Weight bearing as tolerated    CONSULTATIONS  Critical Care team    PROCEDURES  Ventral herniorrhaphy with mesh.    LABORATORY  Lab Results   Component Value Date    SODIUM 141 06/28/2019    POTASSIUM 4.3 06/28/2019    CHLORIDE 109 06/28/2019    CO2 24 06/28/2019    GLUCOSE 118 (H) 06/28/2019    BUN 12 06/28/2019    CREATININE 0.72 06/28/2019     CREATININE 0.9 06/14/2008        Lab Results   Component Value Date    WBC 11.5 (H) 06/28/2019    HEMOGLOBIN 12.1 06/28/2019    HEMATOCRIT 38.1 06/28/2019    PLATELETCT 245 06/28/2019        Total time of the discharge process exceeds 36 minutes.

## 2019-07-03 ENCOUNTER — HOSPITAL ENCOUNTER (EMERGENCY)
Facility: MEDICAL CENTER | Age: 59
End: 2019-07-03
Attending: EMERGENCY MEDICINE
Payer: MEDICARE

## 2019-07-03 VITALS
TEMPERATURE: 96.1 F | HEART RATE: 71 BPM | DIASTOLIC BLOOD PRESSURE: 59 MMHG | BODY MASS INDEX: 28.55 KG/M2 | HEIGHT: 67 IN | OXYGEN SATURATION: 96 % | RESPIRATION RATE: 17 BRPM | SYSTOLIC BLOOD PRESSURE: 136 MMHG | WEIGHT: 181.88 LBS

## 2019-07-03 DIAGNOSIS — R11.2 NON-INTRACTABLE VOMITING WITH NAUSEA, UNSPECIFIED VOMITING TYPE: ICD-10-CM

## 2019-07-03 DIAGNOSIS — N30.00 ACUTE CYSTITIS WITHOUT HEMATURIA: ICD-10-CM

## 2019-07-03 DIAGNOSIS — I95.1 ORTHOSTATIC SYNCOPE: ICD-10-CM

## 2019-07-03 LAB
ALBUMIN SERPL BCP-MCNC: 3.4 G/DL (ref 3.2–4.9)
ALBUMIN/GLOB SERPL: 1.4 G/DL
ALP SERPL-CCNC: 81 U/L (ref 30–99)
ALT SERPL-CCNC: 9 U/L (ref 2–50)
ANION GAP SERPL CALC-SCNC: 8 MMOL/L (ref 0–11.9)
APPEARANCE UR: ABNORMAL
AST SERPL-CCNC: 16 U/L (ref 12–45)
BACTERIA #/AREA URNS HPF: ABNORMAL /HPF
BASOPHILS # BLD AUTO: 0.7 % (ref 0–1.8)
BASOPHILS # BLD: 0.06 K/UL (ref 0–0.12)
BILIRUB SERPL-MCNC: 0.6 MG/DL (ref 0.1–1.5)
BILIRUB UR QL STRIP.AUTO: NEGATIVE
BUN SERPL-MCNC: 11 MG/DL (ref 8–22)
CALCIUM SERPL-MCNC: 8 MG/DL (ref 8.5–10.5)
CHLORIDE SERPL-SCNC: 111 MMOL/L (ref 96–112)
CO2 SERPL-SCNC: 22 MMOL/L (ref 20–33)
COLOR UR: ABNORMAL
CREAT SERPL-MCNC: 0.72 MG/DL (ref 0.5–1.4)
EKG IMPRESSION: NORMAL
EOSINOPHIL # BLD AUTO: 0.29 K/UL (ref 0–0.51)
EOSINOPHIL NFR BLD: 3.3 % (ref 0–6.9)
EPI CELLS #/AREA URNS HPF: ABNORMAL /HPF
ERYTHROCYTE [DISTWIDTH] IN BLOOD BY AUTOMATED COUNT: 44.6 FL (ref 35.9–50)
GLOBULIN SER CALC-MCNC: 2.4 G/DL (ref 1.9–3.5)
GLUCOSE SERPL-MCNC: 106 MG/DL (ref 65–99)
GLUCOSE UR STRIP.AUTO-MCNC: NEGATIVE MG/DL
HCT VFR BLD AUTO: 34.1 % (ref 37–47)
HGB BLD-MCNC: 11.1 G/DL (ref 12–16)
HYALINE CASTS #/AREA URNS LPF: ABNORMAL /LPF
IMM GRANULOCYTES # BLD AUTO: 0.05 K/UL (ref 0–0.11)
IMM GRANULOCYTES NFR BLD AUTO: 0.6 % (ref 0–0.9)
KETONES UR STRIP.AUTO-MCNC: ABNORMAL MG/DL
LEUKOCYTE ESTERASE UR QL STRIP.AUTO: ABNORMAL
LIPASE SERPL-CCNC: 26 U/L (ref 11–82)
LYMPHOCYTES # BLD AUTO: 2.16 K/UL (ref 1–4.8)
LYMPHOCYTES NFR BLD: 24.5 % (ref 22–41)
MCH RBC QN AUTO: 28.8 PG (ref 27–33)
MCHC RBC AUTO-ENTMCNC: 32.6 G/DL (ref 33.6–35)
MCV RBC AUTO: 88.3 FL (ref 81.4–97.8)
MICRO URNS: ABNORMAL
MONOCYTES # BLD AUTO: 0.95 K/UL (ref 0–0.85)
MONOCYTES NFR BLD AUTO: 10.8 % (ref 0–13.4)
NEUTROPHILS # BLD AUTO: 5.32 K/UL (ref 2–7.15)
NEUTROPHILS NFR BLD: 60.1 % (ref 44–72)
NITRITE UR QL STRIP.AUTO: NEGATIVE
NRBC # BLD AUTO: 0 K/UL
NRBC BLD-RTO: 0 /100 WBC
PH UR STRIP.AUTO: 6.5 [PH]
PLATELET # BLD AUTO: 280 K/UL (ref 164–446)
PMV BLD AUTO: 8.6 FL (ref 9–12.9)
POTASSIUM SERPL-SCNC: 3.7 MMOL/L (ref 3.6–5.5)
PROT SERPL-MCNC: 5.8 G/DL (ref 6–8.2)
PROT UR QL STRIP: NEGATIVE MG/DL
RBC # BLD AUTO: 3.86 M/UL (ref 4.2–5.4)
RBC # URNS HPF: ABNORMAL /HPF
RBC UR QL AUTO: NEGATIVE
SODIUM SERPL-SCNC: 141 MMOL/L (ref 135–145)
SP GR UR STRIP.AUTO: 1.02
TROPONIN I SERPL-MCNC: <0.01 NG/ML (ref 0–0.04)
UROBILINOGEN UR STRIP.AUTO-MCNC: 2 MG/DL
WBC # BLD AUTO: 8.8 K/UL (ref 4.8–10.8)
WBC #/AREA URNS HPF: ABNORMAL /HPF

## 2019-07-03 PROCEDURE — 80053 COMPREHEN METABOLIC PANEL: CPT

## 2019-07-03 PROCEDURE — 83690 ASSAY OF LIPASE: CPT

## 2019-07-03 PROCEDURE — 81001 URINALYSIS AUTO W/SCOPE: CPT

## 2019-07-03 PROCEDURE — 93005 ELECTROCARDIOGRAM TRACING: CPT

## 2019-07-03 PROCEDURE — 84484 ASSAY OF TROPONIN QUANT: CPT

## 2019-07-03 PROCEDURE — 85025 COMPLETE CBC W/AUTO DIFF WBC: CPT

## 2019-07-03 PROCEDURE — 36415 COLL VENOUS BLD VENIPUNCTURE: CPT

## 2019-07-03 PROCEDURE — 87086 URINE CULTURE/COLONY COUNT: CPT

## 2019-07-03 PROCEDURE — 99285 EMERGENCY DEPT VISIT HI MDM: CPT

## 2019-07-03 RX ORDER — NITROFURANTOIN 25; 75 MG/1; MG/1
100 CAPSULE ORAL 2 TIMES DAILY
Qty: 14 CAP | Refills: 0 | Status: SHIPPED | OUTPATIENT
Start: 2019-07-03 | End: 2019-07-10

## 2019-07-03 NOTE — DISCHARGE INSTRUCTIONS
It is VERY important that you drink at least 8 glasses of water per day   Follow up with Dr beltran's office and return to the emergency department for any new or worsening issues

## 2019-07-03 NOTE — ED TRIAGE NOTES
Pt at home has syncopal episode where she believes she helped herself to ground, cont not be certain if she hit head or LOC.  Pt a/ox4 gcs 15..  Blood drawn sent to lab. placed on monitor, given 500ml of ns given

## 2019-07-03 NOTE — ED PROVIDER NOTES
ED Provider Note    CHIEF COMPLAINT  Chief Complaint   Patient presents with   • Syncope   • GLF       HPI  Rubi Urbina is a 58 y.o. female who presents to the emergency department chief complaint of syncopal episode.  Patient underwent a ventral hernia repair by Dr. Jimenez on 6/27 at this last month.  Her course is complicated by some severe orthostasis which required IV fluid resuscitation and an extra day of hospitalization.  She is not sure if she is been drinking enough water but states that this evening she felt like she was a little lightheaded when she stood up and felt nauseated had some emesis and then felt like she needed to vomit again stood up felt the same symptoms like she was going to pass out slowly lowered herself to the floor yelled for her  he states when he came in he tried to help her up and at that time when she went from sitting to standing lost consciousness and he helped her to the floor.  When EMS arrived her systolic blood pressure was in the 80s she received 500 cc of IV fluids with appropriate response.  She states she is feeling much better.  She denies any nausea or vomiting currently any chest pain or shortness of breath no constipation she is having normal bowel movements no fevers chills and her scar is healing well    REVIEW OF SYSTEMS  Positives as above. Pertinent negatives include headache weakness numbness tingling vision changes speech difficulty abdominal pain diarrhea constipation fevers chills dysuria hematuria flank pain chest pain shortness of breath night sweats  All other review of systems are negative    PAST MEDICAL HISTORY   has a past medical history of Asthma; AVM (arteriovenous malformation) (rut/durga/ann, 2008); Dental disorder; High cholesterol; Migraine; Partial bowel obstruction (HCC); Seizure disorder ([facility]); Snoring; and Stroke ([facility]) ([address] [place] [address]).    SOCIAL HISTORY  Social History     Social History Main Topics   • Smoking status: Current  "Every Day Smoker     Packs/day: 1.00     Years: 16.00     Types: Cigarettes   • Smokeless tobacco: Never Used      Comment: Started smoking at age 16   • Alcohol use No   • Drug use: No   • Sexual activity: No       SURGICAL HISTORY   has a past surgical history that includes colon resection; colostomy takedown; craniotomy (2005ish); tonsillectomy; tubal coagulation laparoscopic bilateral; and ventral hernia repair (6/27/2019).    CURRENT MEDICATIONS  Home Medications    **Home medications have not yet been reviewed for this encounter**         ALLERGIES  No Known Allergies    PHYSICAL EXAM  VITAL SIGNS: /57   Pulse 77   Temp (!) 35.6 °C (96.1 °F) (Tympanic)   Resp 17   Ht 1.702 m (5' 7\")   Wt 82.5 kg (181 lb 14.1 oz)   LMP  (LMP Unknown)   BMI 28.49 kg/m²    Pulse ox interpretation: I interpret this pulse ox as normal.  Constitutional: Alert in no apparent distress.  HENT: Normocephalic atraumatic, mildly dry MM  Eyes: PER, Conjunctiva normal, Non-icteric.   Neck: Normal range of motion, No tenderness, Supple, No stridor.   Cardiovascular: Regular rate and rhythm, no murmurs.   Thorax & Lungs: Normal breath sounds, No respiratory distress, No wheezing, No chest tenderness.   Abdomen: Bowel sounds normal, Soft, No tenderness, ventral incision with staples in place clean dry and intact with some ecchymosis that is healing without erythema warmth or tenderness no pulsatile masses. No peritoneal signs.  Skin: Warm, Dry, No erythema, No rash.   Back: No bony tenderness, No CVA tenderness.   Extremities: Intact distal pulses, No edema, No tenderness, No cyanosis  Neurologic: Alert and oriented x3, No focal deficits noted.       DIFFERENTIAL DIAGNOSIS AND WORK UP PLAN    This is a 58 y.o. female who presents with syncopal episode of vomiting prior to arrival in the setting of hypotension most likely orthostatic she recently had a admission and severe orthostasis leading to a single longer day stay.  She " currently is just feeling tired but otherwise has no current complaints she is not feeling chest pain or shortness of breath I have low concern for arrhythmia or ACS will evaluate for signs of dehydration or electrolyte abnormality she did respond appropriately to IV fluids by EMS and at this time is no longer nauseated.  Will observe her here in the emergency department perform an EKG laboratory analysis urinalysis and reassess    Orthostatic vitals were normal     DIAGNOSTIC STUDIES / PROCEDURES    EKG  Results for orders placed or performed during the hospital encounter of 19   EKG (NOW)   Result Value Ref Range    Report       [facility] Emergency Dept.    Test Date:  2019  Pt Name:    ASHLEY BERNARDO               Department: ER  MRN:        [id number]                      Room:       [facility]  Gender:     Female                       Technician: [id number]  :        1960                   Requested By:ER TRIAGE PROTOCOL  Order #:    [id number]                    Reading MD: Lora Madden MD    Measurements  Intervals                                Axis  Rate:       72                           P:          66  SC:         136                          QRS:        68  QRSD:       76                           T:          51  QT:         448  QTc:        491    Interpretive Statements  Sinus rhythm at a rate of 72 no ST elevations or ST depressions multiple  PACs, as  well as a borderline prolonged QTC at 490 unchanged from prior otherwise  normal  intervals normal axis no pathognomonic Q waves      Electronically Signed On 7-3-2019 3:53:40 PDT by Lora Madden MD         LABS  Pertinent Lab Findings  CBC within normal limits panel chronic anemia which is unchanged from her recent admission, CMP within normal limits urinalysis does have many bacteria however she does have some epithelial cells but it is turbid with some leuk esterase and elevated white blood cell count normal lipase  "normal troponin will be sent for urine culture      RADIOLOGY  No orders to display     The radiologist's interpretation of all radiological studies have been reviewed by me.      COURSE & MEDICAL DECISION MAKING  Pertinent Labs & Imaging studies reviewed. (See chart for details)    4:25 AM  I reassessed patient at the bedside she is resting comfortably feeling better she denies any dysuria there is no other signs of infection her wound is clean dry and intact, she recently had some severe orthostasis with her recent admission she has not been drinking any water and mostly just coffee at home.  We discussed the importance of increased water intake I believe this is mostly orthostatic especially as she had a positive response to IV fluids from EMS she has not been vomiting here there is no signs of ACS no signs of endorgan damage she will be started on some Macrobid for possible urinary tract infection although its likely is contaminated.  She will follow-up with Dr. Jimenez's office and return to the emergency department in the next 24 to 48 hours for any new or worsening issues.  Patient has a score of 0 via this [place] syncope rule and I feel comfortable letting her go home    /60   Pulse 73   Temp (!) 35.6 °C (96.1 °F) (Tympanic)   Resp 17   Ht 1.702 m (5' 7\")   Wt 82.5 kg (181 lb 14.1 oz)   LMP  (LMP Unknown)   SpO2 96%   BMI 28.49 kg/m²     The patient will return for new or worsening symptoms and is stable at the time of discharge.    The patient is referred to a primary physician for blood pressure management, diabetic screening, and for all other preventative health concerns.    DISPOSITION:  Patient will be discharged home in stable condition.    FOLLOW UP:  [facility]  [address] [facility] [address]  [address]  [place] [id number] [phone]    Schedule an appointment as soon as possible for a visit       Leila Jimenez M.D.  [address] [place] [address] [id number]  [address]  [place] " [id number]  [phone]    Call       [facility], Emergency Dept  [address] [facility] [id number] [phone]    If symptoms worsen      OUTPATIENT MEDICATIONS:  Discharge Medication List as of 7/3/2019  4:31 AM      START taking these medications    Details   nitrofurantoin monohyd macro (MACROBID) 100 MG Cap Take 1 Cap by mouth 2 times a day for 7 days., Disp-14 Cap, R-0, Normal             FINAL IMPRESSION  1. Orthostatic syncope    2. Non-intractable vomiting with nausea, unspecified vomiting type    3. Acute cystitis without hematuria              Electronically signed by: Lora Madden, 7/3/2019 2:51 AM    This dictation has been created using voice recognition software and/or scribes. The accuracy of the dictation is limited by the abilities of the software and the expertise of the scribes. I expect there may be some errors of grammar and possibly content. I made every attempt to manually correct the errors within my dictation. However, errors related to voice recognition software and/or scribes may still exist and should be interpreted within the appropriate context.

## 2019-07-05 LAB
BACTERIA UR CULT: NORMAL
SIGNIFICANT IND 70042: NORMAL
SITE SITE: NORMAL
SOURCE SOURCE: NORMAL

## 2019-07-05 NOTE — DOCUMENTATION QUERY
"                                                                         [facility]                                                                       Query Response Note      PATIENT:               ASHLEY BERNARDO  ACCT #:                  [id number]  MRN:                     [id number]  :                      1960  ADMIT DATE:       2019 11:48 AM  DISCH DATE:        2019 12:41 PM  RESPONDING  PROVIDER #:        [id number]           QUERY TEXT:    Patient developed hypotension postoperatively. Coding clinic advises the  to query the physician for postoperative relationship when documentation is unclear in reference to a condtiion arising postoperatively.    Based on clinical findings, risk factors and treatment can hypotension be further clarified as    NOTE:  If an appropriate response is not listed below, please respond with a new note.    Documentation indicators that raised basis for question:   DS-The patient post operative coarse was essentially unremarkable. However,  she had significant hypotension post operatively when up and out of bed. Initial attempts at resuscitation with crystalloid and was transferred to ICU for 2nd bout of hypotension with standing.  Serial labs were done, which were stable, and she ultimately the hypotension resolved without further interventions/\"    Query created by: Shannon Schulte on 2019 3:48 AM    RESPONSE TEXT:    Incidentally occurring in the postoperative setting          Electronically signed by:  AAMIR KHAN MD 2019 10:09 AM              "

## 2019-07-08 ENCOUNTER — PATIENT MESSAGE (OUTPATIENT)
Dept: MEDICAL GROUP | Facility: MEDICAL CENTER | Age: 59
End: 2019-07-08

## 2019-07-08 DIAGNOSIS — E55.9 VITAMIN D DEFICIENCY: ICD-10-CM

## 2019-07-08 DIAGNOSIS — E78.5 HYPERLIPIDEMIA, UNSPECIFIED HYPERLIPIDEMIA TYPE: ICD-10-CM

## 2019-07-08 NOTE — TELEPHONE ENCOUNTER
From: Rubi Urbina  To: DARIUS Mazariegos  Sent: 7/8/2019 2:54 PM PDT  Subject: Test Result Question    I have had a lot of lab work done recently do you still want me to do the ones you gave me for our appt in August or were they done already by dr ebltran and sandy lisa dr??

## 2019-09-05 ENCOUNTER — OFFICE VISIT (OUTPATIENT)
Dept: NEUROLOGY | Facility: MEDICAL CENTER | Age: 59
End: 2019-09-05
Payer: MEDICARE

## 2019-09-05 ENCOUNTER — HOSPITAL ENCOUNTER (OUTPATIENT)
Dept: LAB | Facility: MEDICAL CENTER | Age: 59
End: 2019-09-05
Attending: NURSE PRACTITIONER
Payer: MEDICARE

## 2019-09-05 VITALS
HEART RATE: 75 BPM | TEMPERATURE: 97.2 F | WEIGHT: 157.8 LBS | RESPIRATION RATE: 16 BRPM | DIASTOLIC BLOOD PRESSURE: 86 MMHG | OXYGEN SATURATION: 94 % | BODY MASS INDEX: 29.04 KG/M2 | SYSTOLIC BLOOD PRESSURE: 122 MMHG | HEIGHT: 62 IN

## 2019-09-05 DIAGNOSIS — E78.5 HYPERLIPIDEMIA, UNSPECIFIED HYPERLIPIDEMIA TYPE: ICD-10-CM

## 2019-09-05 DIAGNOSIS — G40.909 SEIZURE DISORDER (HCC): Primary | ICD-10-CM

## 2019-09-05 DIAGNOSIS — I69.30 HISTORY OF CEREBROVASCULAR ACCIDENT (CVA) WITH RESIDUAL DEFICIT: ICD-10-CM

## 2019-09-05 DIAGNOSIS — E55.9 VITAMIN D DEFICIENCY: ICD-10-CM

## 2019-09-05 LAB
25(OH)D3 SERPL-MCNC: 17 NG/ML (ref 30–100)
CHOLEST SERPL-MCNC: 136 MG/DL (ref 100–199)
FASTING STATUS PATIENT QL REPORTED: NORMAL
HDLC SERPL-MCNC: 39 MG/DL
LDLC SERPL CALC-MCNC: 53 MG/DL
TRIGL SERPL-MCNC: 222 MG/DL (ref 0–149)

## 2019-09-05 PROCEDURE — 82306 VITAMIN D 25 HYDROXY: CPT

## 2019-09-05 PROCEDURE — 99205 OFFICE O/P NEW HI 60 MIN: CPT | Performed by: PSYCHIATRY & NEUROLOGY

## 2019-09-05 PROCEDURE — 36415 COLL VENOUS BLD VENIPUNCTURE: CPT

## 2019-09-05 PROCEDURE — 80061 LIPID PANEL: CPT

## 2019-09-05 RX ORDER — OXCARBAZEPINE 300 MG/1
300 TABLET, FILM COATED ORAL 2 TIMES DAILY
Qty: 60 TAB | Refills: 11 | Status: SHIPPED | OUTPATIENT
Start: 2019-09-05 | End: 2020-07-01 | Stop reason: SDUPTHER

## 2019-09-05 RX ORDER — OXCARBAZEPINE 300 MG/1
300 TABLET, FILM COATED ORAL 2 TIMES DAILY
Qty: 60 TAB | Refills: 11 | Status: SHIPPED | OUTPATIENT
Start: 2019-09-05 | End: 2019-09-05 | Stop reason: SDUPTHER

## 2019-09-05 SDOH — HEALTH STABILITY: MENTAL HEALTH: HOW OFTEN DO YOU HAVE A DRINK CONTAINING ALCOHOL?: MONTHLY OR LESS

## 2019-09-05 ASSESSMENT — ENCOUNTER SYMPTOMS
WEIGHT LOSS: 0
TINGLING: 0
WEAKNESS: 1
MEMORY LOSS: 0
HEADACHES: 0
SENSORY CHANGE: 1
FOCAL WEAKNESS: 1
FALLS: 0
LOSS OF CONSCIOUSNESS: 0
SPEECH CHANGE: 0
SEIZURES: 0

## 2019-09-05 NOTE — PROGRESS NOTES
Subjective:      Rubi Urbina is a 58 y.o. female who presents with her  Lester, from the office of MARTIN Marquez, for consultation with a history of symptomatic seizures and left-sided motor, sensory and visual deficits following AVM embolization and gamma knife treatments.  History is gotten from discussions with the patient and her  as well as review of the electronic health records.     CANDIDO Ojeda is a pleasant 58-year-old right-handed female whose symptoms had started in about 2006 when she had begun to suffer from headaches.  Imaging of the brain at that time demonstrated an incidental large right hemispheric AVM extending from the cortex to the lateral ventricle, from the posterior frontal to the anterior parietal lobes.  Follow-up studies were performed, the last in June, 2007, and she subsequently underwent 3 embolizations, follow-up study in October, 2007 revealed partial ablation of the AVM but a residual nidus.  In July, 2009, follow-up MRI did reveal evidence of cerebral ischemia and a residual nidus, she subsequently underwent gamma knife therapy at [facility].  They are not sure if follow-up MRI imaging had been performed, CT scan of the brain was done here at this hospital in 2010, I reviewed the images, again showing encephalomalacia and an underlying vascular structure of unclear significance.    With the embolizations, right hemispheric stroke left her with left-sided sensory, motor and visual distortions.  Her first seizure occurred in about 2012, a couple of years after her gamma knife treatments.  This was essentially a twitching of the left upper extremity spreading to the face and leg.  She was not aware of these events happening during her procedures themselves when the AVM is still being treated.  Regardless, she was treated as an outpatient by a local neurologist, Dr. Sullivan, who has subsequently retired.    EEG studies evidently were abnormal without change, but  "they cannot recall specifics, especially if there was an underlying epileptiform risk.  She was placed on oxcarbazepine, now 300 mg twice daily.  If she is late with a dose she feels \"weird\", taken medication results in resolution of the symptoms within about 30 minutes.  She does not miss a dose.  She has not had a focal seizure in quite a long time.    The left-sided visual distortions are quite significant, motor deficits involve the arm and to a lesser degree the leg though they both weakened and stiffened.  She has some use with the left hand and purposeful sense.  Sensory distortions involved both some loss of sensation but also mild hyperpathia in the arm and leg.  Speech itself is clear, she has had no problems with swallowing.  Headaches have never been an issue.  She is unsteady when she walks, the symptoms all transiently worsened when she is tired or fatigued.    Other than the above, she also has a medical history of dyslipidemia, no history of other cerebrovascular risk, malignancy, thyroid disease, seizure as a child, migraine, autoimmune disease, liver kidney disease, psychiatric disease, pulmonary disease, hypertension or diabetes.  There is no surgical history of note otherwise.    She is not aware of anyone in the family with a history of AVM, CVA, seizure, neuro degenerative disease or cerebrovascular risk.  Both her parents have passed.  2 of 4 siblings also have passed.  She has 4 children all of whom are alive and well.    She does smoke regularly, she rarely drinks alcohol.  She is presently disabled.    Other than Trileptal 300 mg, twice daily, she is on Lipitor 40 mg daily.    Review of Systems   Constitutional: Negative for malaise/fatigue and weight loss.   Musculoskeletal: Negative for falls.   Neurological: Positive for sensory change, focal weakness and weakness. Negative for tingling, speech change, seizures, loss of consciousness and headaches.   Psychiatric/Behavioral: Negative for " "memory loss.   All other systems reviewed and are negative.       Objective:     /86 (BP Location: Right arm, Patient Position: Sitting)   Pulse 75   Temp 36.2 °C (97.2 °F)   Resp 16   Ht 1.575 m (5' 2\")   Wt 71.6 kg (157 lb 12.8 oz)   LMP  (LMP Unknown)   SpO2 94%   BMI 28.86 kg/m²      Physical Exam    She appears in no acute distress.  Her vital signs are stable.  There is no malar rash or jaw claudication.  Dentition is poor but there is no periodontal disease.  The neck is supple, range of motion is full, carotid pulses are present bilaterally without asymmetry.  Cardiac evaluation reveals a regular rhythm.  There is no lower extremity edema.    She is fully oriented, there is no aphasia, agnosia, apraxia, there is some mild left-sided inattention but no denial of deficit.    PERRLA/EOMI, there is a left homonymous hemianopsia, inferior quadrant more noticeable than the superior quadrant.  There is a left facial droop, there is a left-sided sensory distortion, diminished pinprick and temperature, there is also left-sided extinction.  The tongue and uvula are midline, there is no dysarthria, left shoulder shrug is slowed when compared to the right.    Musculoskeletal exam reveals a spastic left hemiparesis.  Strength and tone are intact on the right.  The weakness is more profound in the upper extremity, also more profound distally in both extremities.  Reflexes are brisker on the left side, the left toe is equivocal to upgoing, the right downgoing.    There is some dystonic posturing and dyskinetic movements seen with the left hand with volitional activity.  Repetitive movements are slowed with the left hand proportion to the degree of weakness and increased tone.  There is some mild appendicular dystaxia with the left upper and lower extremity.  When she walks, she has a spastic quality on the left, station is widened.    Sensory exam reveals a left-sided sensory extinction on double simultaneous " "stimulation, there is a subjective decrement to light touch, temperature, and pinprick in the left upper extremity, there is almost a mild hyperpathia with temperature and pinprick in the left leg when compared to the right.     Assessment/Plan:     1. Seizure disorder (HCC)  Fortunately, she has remained clinically stable for quite some time, routine EEG should be obtained for as a baseline study, as I suspect there will be abnormalities, the issue as are the epileptiform in nature.  Trileptal 300 mg, twice daily will be continued.  Blood work so far has been stable, she has not had issues with hyponatremia.  She understands the need for compliance, whatever these \"weird\" feelings are that she experiences if she is late with the drug was difficult to characterize.  This could be simply a side effect of the drug rather than seizure recurrence.    I did talk with her at length about seizure recurrence risk, circumstances that lower thresholds, as well as what to do for seizure recurrence itself, whether going to an emergency room or calling my office.  I was also insistent she obtain some type of medical identification.    - REFERRAL TO NEURODIAGNOSTICS (EEG,EP,EMG/NCS/DBS) Modality Requested: EEG-Video  - OXcarbazepine (TRILEPTAL) 300 MG Tab; Take 1 Tab by mouth 2 times a day.  Dispense: 60 Tab; Refill: 11    2. History of cerebrovascular accident (CVA) with residual deficit  As a baseline study MRI of the brain and MRA of the brain should be done, giving us a baseline structurally in case something else were to happen moving forwards.  Though I have a low level suspicion the AVM itself has recurred, one never knows.  She is certainly had no clinical symptoms by history to suggest this is the case.  The rationale for this was reviewed and full, they felt quite comfortable with this.  We will follow-up otherwise in 6 months, we will call them earlier with test results as they come in and if they are significant, " warranting acute change in treatment, etc.    - MR-BRAIN-WITH & W/O; Future  - MR-MRA HEAD-W/O; Future  - Renal Function Panel; Future    Time: 60-minute spent face-to-face for exam, review, discussion, and education, of this over 50% of the time spent counseling and coordinating care.

## 2019-09-11 DIAGNOSIS — E78.00 PURE HYPERCHOLESTEROLEMIA: ICD-10-CM

## 2019-09-11 RX ORDER — ATORVASTATIN CALCIUM 40 MG/1
TABLET, FILM COATED ORAL
Qty: 90 TAB | Refills: 2 | Status: SHIPPED | OUTPATIENT
Start: 2019-09-11 | End: 2020-06-09

## 2019-10-28 ENCOUNTER — OFFICE VISIT (OUTPATIENT)
Dept: MEDICAL GROUP | Facility: MEDICAL CENTER | Age: 59
End: 2019-10-28
Payer: MEDICARE

## 2019-10-28 VITALS
RESPIRATION RATE: 14 BRPM | HEART RATE: 91 BPM | TEMPERATURE: 97.1 F | OXYGEN SATURATION: 95 % | BODY MASS INDEX: 28.89 KG/M2 | SYSTOLIC BLOOD PRESSURE: 138 MMHG | DIASTOLIC BLOOD PRESSURE: 72 MMHG | HEIGHT: 62 IN | WEIGHT: 157 LBS

## 2019-10-28 DIAGNOSIS — Z12.31 ENCOUNTER FOR SCREENING MAMMOGRAM FOR BREAST CANCER: ICD-10-CM

## 2019-10-28 DIAGNOSIS — E55.9 VITAMIN D DEFICIENCY: ICD-10-CM

## 2019-10-28 DIAGNOSIS — Z13.29 SCREENING FOR THYROID DISORDER: ICD-10-CM

## 2019-10-28 DIAGNOSIS — G81.94 LEFT HEMIPLEGIA (HCC): ICD-10-CM

## 2019-10-28 DIAGNOSIS — Q27.30 AVM (ARTERIOVENOUS MALFORMATION): ICD-10-CM

## 2019-10-28 DIAGNOSIS — G40.909 SEIZURE DISORDER (HCC): ICD-10-CM

## 2019-10-28 DIAGNOSIS — E78.5 HYPERLIPIDEMIA, UNSPECIFIED HYPERLIPIDEMIA TYPE: ICD-10-CM

## 2019-10-28 DIAGNOSIS — I69.30 HISTORY OF CEREBROVASCULAR ACCIDENT (CVA) WITH RESIDUAL DEFICIT: ICD-10-CM

## 2019-10-28 DIAGNOSIS — Z23 NEED FOR VACCINATION: ICD-10-CM

## 2019-10-28 DIAGNOSIS — Z11.59 NEED FOR HEPATITIS C SCREENING TEST: ICD-10-CM

## 2019-10-28 DIAGNOSIS — Z00.00 MEDICARE ANNUAL WELLNESS VISIT, SUBSEQUENT: ICD-10-CM

## 2019-10-28 PROBLEM — K43.6 INCARCERATED VENTRAL HERNIA: Status: RESOLVED | Noted: 2019-06-27 | Resolved: 2019-10-28

## 2019-10-28 PROCEDURE — 90472 IMMUNIZATION ADMIN EACH ADD: CPT | Performed by: NURSE PRACTITIONER

## 2019-10-28 PROCEDURE — G0439 PPPS, SUBSEQ VISIT: HCPCS | Performed by: NURSE PRACTITIONER

## 2019-10-28 PROCEDURE — G0009 ADMIN PNEUMOCOCCAL VACCINE: HCPCS | Performed by: NURSE PRACTITIONER

## 2019-10-28 PROCEDURE — 90715 TDAP VACCINE 7 YRS/> IM: CPT | Performed by: NURSE PRACTITIONER

## 2019-10-28 PROCEDURE — 90732 PPSV23 VACC 2 YRS+ SUBQ/IM: CPT | Performed by: NURSE PRACTITIONER

## 2019-10-28 ASSESSMENT — PATIENT HEALTH QUESTIONNAIRE - PHQ9: CLINICAL INTERPRETATION OF PHQ2 SCORE: 0

## 2019-10-28 ASSESSMENT — ACTIVITIES OF DAILY LIVING (ADL): BATHING_REQUIRES_ASSISTANCE: 0

## 2019-10-28 ASSESSMENT — ENCOUNTER SYMPTOMS: GENERAL WELL-BEING: GOOD

## 2019-11-14 ENCOUNTER — HOSPITAL ENCOUNTER (OUTPATIENT)
Dept: RADIOLOGY | Facility: MEDICAL CENTER | Age: 59
End: 2019-11-14
Attending: PSYCHIATRY & NEUROLOGY
Payer: MEDICARE

## 2019-11-14 DIAGNOSIS — I69.30 HISTORY OF CEREBROVASCULAR ACCIDENT (CVA) WITH RESIDUAL DEFICIT: ICD-10-CM

## 2019-11-14 PROCEDURE — A9576 INJ PROHANCE MULTIPACK: HCPCS | Performed by: PSYCHIATRY & NEUROLOGY

## 2019-11-14 PROCEDURE — 70544 MR ANGIOGRAPHY HEAD W/O DYE: CPT

## 2019-11-14 PROCEDURE — 700117 HCHG RX CONTRAST REV CODE 255: Performed by: PSYCHIATRY & NEUROLOGY

## 2019-11-14 PROCEDURE — 70553 MRI BRAIN STEM W/O & W/DYE: CPT

## 2019-11-14 RX ADMIN — GADOTERIDOL 15 ML: 279.3 INJECTION, SOLUTION INTRAVENOUS at 14:01

## 2019-11-21 ENCOUNTER — TELEPHONE (OUTPATIENT)
Dept: NEUROLOGY | Facility: MEDICAL CENTER | Age: 59
End: 2019-11-21

## 2019-11-21 ENCOUNTER — TELEPHONE (OUTPATIENT)
Dept: MEDICAL GROUP | Facility: MEDICAL CENTER | Age: 59
End: 2019-11-21

## 2019-11-21 DIAGNOSIS — G47.34 NOCTURNAL HYPOXIA: ICD-10-CM

## 2019-11-21 NOTE — TELEPHONE ENCOUNTER
"pls call patient and ensure she has seen her Maraquia message      \"Rubi-  Your overnight sleep test showed your oxygen was low overnight for a significant amount of time.  I will place a referral for you to consult with the sleep clinic.    Regarding the referral, you will either be called to schedule an appointment or be given the information over your Maraquia email to schedule on your own.  You can also contact the referral department directly at [phone] if you desire.\"    "

## 2019-11-21 NOTE — TELEPHONE ENCOUNTER
Mychart sent since patient utilizes [employer]. I see that she viewed the results but will make sure she saw Samreen's message.

## 2019-11-22 NOTE — TELEPHONE ENCOUNTER
Yung, please call the patient and let them know that the MRA of the brain showed the AVM itself, no change of note in the blood vessels itself.  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  Unfortunately, I reviewed the previous studies since the AVM was treated, unfortunately the left internal carotid artery was not catheterized and viewed with the original studies in 2007 when the AVM was treated.  There have been no other MRA studies done until now.  Thus, I cannot say how long this aneurysm has been there.  We will talk with our neuro interventional specialist in regards to this.

## 2019-11-26 PROBLEM — G47.34 NOCTURNAL HYPOXIA: Status: ACTIVE | Noted: 2019-11-26

## 2020-01-20 ENCOUNTER — NON-PROVIDER VISIT (OUTPATIENT)
Dept: NEUROLOGY | Facility: MEDICAL CENTER | Age: 60
End: 2020-01-20
Payer: MEDICARE

## 2020-01-20 DIAGNOSIS — G40.909 SEIZURE DISORDER (HCC): ICD-10-CM

## 2020-01-20 NOTE — PROCEDURES
ROUTINE ELECTROENCEPHALOGRAM REPORT      Referring provider: Dr. Perez.    DOS: 1/20/2020 (total recording of 24 minutes)    INDICATION:  Rubi Urbina 59 y.o. female presenting with seizures.    CURRENT ANTIEPILEPTIC REGIMEN: Oxcarbazepine 300 mg twice daily.    TECHNIQUE: 30 channel routine electroencephalogram (EEG) was performed in accordance with the international 10-20 system. The study was reviewed in bipolar and referential montages. The recording examined the patient during wakeful and drowsy state(s).     DESCRIPTION OF THE RECORD:  During the wakefulness, the background showed a symmetrical 10 hz alpha activity posteriorly with amplitude of 70 mV.  There was reactivity to eye closure/opening.  A normal anterior-posterior gradient was noted with faster beta frequencies seen anteriorly.  During drowsiness, theta/delta frequencies were seen.  s.     ACTIVATION PROCEDURES:   Intermittent Photic stimulation was performed in a stepwise fashion from 1 to 30 Hz and elicited a normal response (photic driving), most noticeable in the posterior leads.      ICTAL AND/OR INTERICTAL FINDINGS:   Intermittent, mild left temporal slowing noted.  No clinical events or seizures were reported or recorded during the study.     EKG: sampling of the EKG recording demonstrated sinus rhythm.       INTERPRETATION:  This is this is an abnormal routine EEG recording in the awake and drowsy states. Intermittent, mild left temporal slowing noted.  The findings suggest underlying structural abnormality.  No seizures were captured during the study.  Clinical and radiological correlation is recommended.    Note: This EEG does not rule out epilepsy.  If the clinical suspicion remains high for seizures, a prolonged recording to capture clinical or subclinical events may be helpful.        James Melendez MD   Epilepsy and Neurodiagnostics.   Clinical  of Neurology [facility]  Medicine.   Diplomate in Neurology, Epilepsy, and Electrodiagnostic Medicine.   Office: [phone]  Fax: [phone]

## 2020-01-21 ENCOUNTER — TELEPHONE (OUTPATIENT)
Dept: NEUROLOGY | Facility: MEDICAL CENTER | Age: 60
End: 2020-01-21

## 2020-01-22 NOTE — TELEPHONE ENCOUNTER
Regarding: Test Result Question  ----- Message from Luke Julio Ass't sent at 1/20/2020 12:56 PM PST -----       ----- Message from Rubi Urbina to Rudy Perez M.D. sent at 1/20/2020 12:18 PM -----   I was waiting to hear from you after you spoke with specialist about the aneurysm found in the MRI.  Is there a plan of action or anything?

## 2020-01-24 NOTE — TELEPHONE ENCOUNTER
Yung  Call the patient and let her know I did finally get a hold of the neuroradiologists here at the hospital, reviewed her films.  Given its size, the aneurysm is not prone to bleeding or rupture, always the most critical issue.  Thus, we can afford to watch and wait, but will require regular imaging on an annual basis.  Aneurysms can always increase in size, and thus the risk of bleeding then increases.  On the other hand, if she is still uncomfortable with this, she can be seen by our neuroradiologist and they can talk with her about the procedure, etc.  Thank you

## 2020-01-27 PROCEDURE — 95816 EEG AWAKE AND DROWSY: CPT | Performed by: PSYCHIATRY & NEUROLOGY

## 2020-03-20 ENCOUNTER — APPOINTMENT (OUTPATIENT)
Dept: NEUROLOGY | Facility: MEDICAL CENTER | Age: 60
End: 2020-03-20
Payer: MEDICARE

## 2020-04-06 ENCOUNTER — APPOINTMENT (OUTPATIENT)
Dept: NEUROLOGY | Facility: MEDICAL CENTER | Age: 60
End: 2020-04-06
Payer: MEDICARE

## 2020-07-01 ENCOUNTER — OFFICE VISIT (OUTPATIENT)
Dept: NEUROLOGY | Facility: MEDICAL CENTER | Age: 60
End: 2020-07-01
Payer: MEDICARE

## 2020-07-01 VITALS
HEART RATE: 88 BPM | WEIGHT: 156 LBS | SYSTOLIC BLOOD PRESSURE: 136 MMHG | HEIGHT: 62 IN | BODY MASS INDEX: 28.71 KG/M2 | DIASTOLIC BLOOD PRESSURE: 86 MMHG

## 2020-07-01 DIAGNOSIS — I67.1 ANEURYSM OF CAVERNOUS PORTION OF LEFT INTERNAL CAROTID ARTERY: ICD-10-CM

## 2020-07-01 DIAGNOSIS — G40.909 SEIZURE DISORDER (HCC): Primary | ICD-10-CM

## 2020-07-01 DIAGNOSIS — Q27.30 AVM (ARTERIOVENOUS MALFORMATION): ICD-10-CM

## 2020-07-01 PROCEDURE — 99214 OFFICE O/P EST MOD 30 MIN: CPT | Performed by: PSYCHIATRY & NEUROLOGY

## 2020-07-01 RX ORDER — OXCARBAZEPINE 300 MG/1
300 TABLET, FILM COATED ORAL 2 TIMES DAILY
Qty: 180 TAB | Refills: 3 | Status: SHIPPED | OUTPATIENT
Start: 2020-07-01 | End: 2021-06-21 | Stop reason: SDUPTHER

## 2020-07-01 ASSESSMENT — FIBROSIS 4 INDEX: FIB4 SCORE: 1.12

## 2020-07-01 ASSESSMENT — ENCOUNTER SYMPTOMS
SEIZURES: 0
MEMORY LOSS: 1
TINGLING: 0
DEPRESSION: 1
SENSORY CHANGE: 1
LOSS OF CONSCIOUSNESS: 0
FOCAL WEAKNESS: 1
FALLS: 0

## 2020-07-01 ASSESSMENT — PATIENT HEALTH QUESTIONNAIRE - PHQ9: CLINICAL INTERPRETATION OF PHQ2 SCORE: 0

## 2020-07-01 NOTE — PROGRESS NOTES
"Subjective:      uRbi Urbina is a 59 y.o. female who presents for follow-up, with a history of symptomatic seizures related to a right AVM resection, but whose MRA scan revealed an incidental left ICA aneurysm.    HPI    Seizures: Rubi has done well, over the last year, has had no seizure recurrences.  Her last and only few seizures actually occurred around the time of her diagnosis and postoperative course for the AVM itself.  She is on Trileptal 300 mg, twice daily with good tolerability and she is compliant.    Her EEG study from January, 2020, actually revealed some minimal left temporal slowing only, nothing epileptiform in nature.    AVM: Clinically stable, she has had no episodes suggestive of recurrence.  MRI the brain and MRA of the brain were repeated on November 14, 2019, when compared to studies from 2007 in 2009, there is no interval change in the postoperative area, minimal flow through a residual right MCA artery branch.  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    Medical, surgical and family histories are reviewed, there are no new drug allergies.  She is asking for a disability rating form to be completed so that she can continue her she is on Trileptal 300 mg, twice daily and Lipitor 40 mg daily.    Review of Systems   Musculoskeletal: Negative for falls.   Neurological: Positive for sensory change and focal weakness. Negative for tingling, seizures and loss of consciousness.   Psychiatric/Behavioral: Positive for depression and memory loss.   All other systems reviewed and are negative.       Objective:     /86 (BP Location: Left arm, Patient Position: Sitting, BP Cuff Size: Adult)   Pulse 88   Ht 1.575 m (5' 2\")   Wt 70.8 kg (156 lb)   LMP  (LMP Unknown)   BMI 28.53 kg/m²      Physical Exam    She appears in no acute distress.  Vital signs are stable.  There is no malar rash or temporal tenderness.  The neck is " supple, range of motion is full.  Cardiac evaluation reveals a regular rhythm.  There is no evidence of rash, bruising, or lower extremity edema.    She is fully oriented, there is no aphasia, apraxia, or inattention.    PERRLA/EOMI, visual fields are full to movement detection on confrontation bilaterally, there is no extinction with double simultaneous stimulation.  There is a mild left facial droop.  There is slight dysarthria but the tongue and uvula are midline.  The left shoulder shrug is slowed when compared to the right, head rotation is intact.    Musculoskeletal exam again reveals the spastic left hemiparesis, unchanged from her previous exam.  Reflexes are brisker on the left.    She walks with the expected spastic quality, she has a cane which she can use though she like to avoid doing so.  Stride length is diminished with the left leg, there is circumduction.  There is incoordination with the left upper and lower extremity out of proportion to the degree of weakness.  Repetitive movements are slowed with the left hand and foot proportionate to the weakness that is there.  Strength and tone and coordination are intact on the right.    Sensory exam is intact to light touch bilaterally, there is no sensory extinction with the left side on double simultaneous stimulation.     Assessment/Plan:     1. Seizure disorder (HCC)  She is doing well, her EEG findings were little unusual but not necessarily unexpected.  Certainly nothing that would warrant an empiric change in her Trileptal dosing regimen.    - OXcarbazepine (TRILEPTAL) 300 MG Tab; Take 1 Tab by mouth 2 times a day.  Dispense: 180 Tab; Refill: 3    2. AVM (arteriovenous malformation)  Radiographically stable, it is the spastic left hemiparesis and incoordination that results in total disability.  Paperwork will be completed indicating the same.  Recurrent imaging is not necessary.    3. 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  Its present size does not predispose to hemorrhage.    She will be following up with MARELY Butt, in 1 year.    Time: 25-minute spent face-to-face for exam, review, discussion, and education, of this over 50% of the time spent counseling and coordinating care.

## 2021-03-15 DIAGNOSIS — Z23 NEED FOR VACCINATION: ICD-10-CM

## 2021-03-17 ENCOUNTER — IMMUNIZATION (OUTPATIENT)
Dept: FAMILY PLANNING/WOMEN'S HEALTH CLINIC | Facility: IMMUNIZATION CENTER | Age: 61
End: 2021-03-17
Attending: INTERNAL MEDICINE
Payer: MEDICARE

## 2021-03-17 DIAGNOSIS — Z23 ENCOUNTER FOR VACCINATION: Primary | ICD-10-CM

## 2021-03-17 DIAGNOSIS — Z23 NEED FOR VACCINATION: ICD-10-CM

## 2021-03-17 PROCEDURE — 0001A PFIZER SARS-COV-2 VACCINE: CPT

## 2021-03-17 PROCEDURE — 91300 PFIZER SARS-COV-2 VACCINE: CPT

## 2021-03-31 ENCOUNTER — TELEMEDICINE (OUTPATIENT)
Dept: MEDICAL GROUP | Facility: MEDICAL CENTER | Age: 61
End: 2021-03-31
Payer: MEDICARE

## 2021-03-31 VITALS
BODY MASS INDEX: 30.36 KG/M2 | DIASTOLIC BLOOD PRESSURE: 88 MMHG | SYSTOLIC BLOOD PRESSURE: 175 MMHG | WEIGHT: 165 LBS | HEART RATE: 75 BPM | HEIGHT: 62 IN

## 2021-03-31 DIAGNOSIS — Z90.49 S/P PARTIAL COLECTOMY: ICD-10-CM

## 2021-03-31 DIAGNOSIS — E78.5 HYPERLIPIDEMIA, UNSPECIFIED HYPERLIPIDEMIA TYPE: ICD-10-CM

## 2021-03-31 DIAGNOSIS — E55.9 VITAMIN D DEFICIENCY: ICD-10-CM

## 2021-03-31 DIAGNOSIS — G40.909 SEIZURE DISORDER (HCC): ICD-10-CM

## 2021-03-31 DIAGNOSIS — I69.30 HISTORY OF CEREBROVASCULAR ACCIDENT (CVA) WITH RESIDUAL DEFICIT: ICD-10-CM

## 2021-03-31 DIAGNOSIS — F17.200 SMOKER: ICD-10-CM

## 2021-03-31 DIAGNOSIS — R03.0 ELEVATED BLOOD PRESSURE READING: ICD-10-CM

## 2021-03-31 PROCEDURE — 99214 OFFICE O/P EST MOD 30 MIN: CPT | Performed by: FAMILY MEDICINE

## 2021-03-31 ASSESSMENT — FIBROSIS 4 INDEX: FIB4 SCORE: 1.142857142857142857

## 2021-03-31 ASSESSMENT — PATIENT HEALTH QUESTIONNAIRE - PHQ9: CLINICAL INTERPRETATION OF PHQ2 SCORE: 0

## 2021-03-31 NOTE — PROGRESS NOTES
Virtual Visit: Established Patient   This visit was conducted via Zoom  using secure and encrypted videoconferencing technology. The patient was in a private location in the state of [place].    The patient's identity was confirmed and verbal consent was obtained for this virtual visit.      CC: New patient: Seizure, history of stroke, history of partial colectomy, vitamin D deficiency, hyperlipidemia, smoker, elevated blood pressure    HPI:  Rubi presents today to establish new PCP    Patient has been active and independent with all ADLs.  Has the following chronic conditions issues:    Seizure disorder (HCC)  Patient's last seizure episode was 9 years ago while she was having craniotomy due to arteriovenous malformation in the brain.  She has been on Trileptal 300 mg twice a day.  She has been following up with neurology Dr. Smith.    History of cerebrovascular accident (CVA) with residual deficit  Has been having mild left side weakness since she had a stroke 9 years ago after craniotomy due to AVM.  Currently on atorvastatin 40 mg daily      S/P partial colectomy  History of partial colectomy due to obstruction.  Currently asymptomatic.    Vitamin D deficiency disease  Vitamin D level was checked in 2019 was 17.  Today patient was treated with large dose vitamin D for 3 months at that time.  Currently not on vitamin D    Hyperlipidemia, unspecified hyperlipidemia type  He has been tolerating the statin. Denies muscle pain LFTs has been normal, she has been on atorvastatin 40 mg daily.    Smoker  Smoke about a pack a day for more than currently asymptomatic.  Not ready to quit    Elevated blood pressure reading   No history of hypertension, however blood pressure today is high.  Denies any headache, chest pain, shortness of breath.  Patient is not on medication.    We will discuss health maintenance topics next visit.    Patient Active Problem List    Diagnosis Date Noted   • Nocturnal hypoxia 11/26/2019   •  Seizure disorder (HCC) 08/20/2018   • Vitamin D deficiency disease 07/05/2016   • AVM (arteriovenous malformation) 06/29/2016   • S/P partial colectomy 06/29/2016   • Left hemiplegia (HCC) 06/29/2016   • History of cerebrovascular accident (CVA) with residual deficit 06/29/2016   • Smoker 06/29/2016       Current Outpatient Medications   Medication Sig Dispense Refill   • OXcarbazepine (TRILEPTAL) 300 MG Tab Take 1 Tab by mouth 2 times a day. 180 Tab 3   • atorvastatin (LIPITOR) 40 MG Tab TAKE ONE TABLET BY MOUTH EVERY NIGHT AT BEDTIME 90 Tab 3     No current facility-administered medications for this visit.         Allergies as of 03/31/2021   • (No Known Allergies)        Social History     Socioeconomic History   • Marital status:      Spouse name: Not on file   • Number of children: Not on file   • Years of education: Not on file   • Highest education level: Not on file   Occupational History   • Not on file   Tobacco Use   • Smoking status: Current Every Day Smoker     Packs/day: 1.00     Years: 30.00     Pack years: 30.00     Types: Cigarettes   • Smokeless tobacco: Never Used   • Tobacco comment: Started smoking at age 16   Substance and Sexual Activity   • Alcohol use: Yes   • Drug use: No   • Sexual activity: Never     Partners: Male     Birth control/protection: Post-Menopausal   Other Topics Concern   • Not on file   Social History Narrative   • Not on file     Social Determinants of Health     Financial Resource Strain:    • Difficulty of Paying Living Expenses:    Food Insecurity:    • Worried About Running Out of Food in the Last Year:    • Ran Out of Food in the Last Year:    Transportation Needs:    • Lack of Transportation (Medical):    • Lack of Transportation (Non-Medical):    Physical Activity:    • Days of Exercise per Week:    • Minutes of Exercise per Session:    Stress:    • Feeling of Stress :    Social Connections:    • Frequency of Communication with Friends and Family:    •  "Frequency of Social Gatherings with Friends and Family:    • Attends Muslim Services:    • Active Member of Clubs or Organizations:    • Attends Club or Organization Meetings:    • Marital Status:    Intimate Partner Violence:    • Fear of Current or Ex-Partner:    • Emotionally Abused:    • Physically Abused:    • Sexually Abused:        Family History   Problem Relation Age of Onset   • Lung Disease Mother         asthma   • Heart Attack Mother    • Diabetes Father    • Cancer Neg Hx        Past Surgical History:   Procedure Laterality Date   • VENTRAL HERNIA REPAIR  6/27/2019    Procedure: REPAIR, HERNIA, VENTRAL;  Surgeon: Leila Jimenez M.D.;  Location: SURGERY [facility];  Service: General   • COLON RESECTION      colostomy, colostomy take d   • COLOSTOMY TAKEDOWN     • CRANIOTOMY  2005ish    AVM treatment   • TONSILLECTOMY     • TUBAL COAGULATION LAPAROSCOPIC BILATERAL         ROS:  Denies any Headache, Blurred Vision, Confusion Chest pain,  Shortness of breath,  Abdominal pain, Changes of bowel or bladder, Lower ext edema, Fevers, Nights sweats, Weight Changes, Focal weakness or numbness.  All other systems are negative.      BP (!) 175/88   Pulse 75   Ht 1.575 m (5' 2\")   Wt 74.8 kg (165 lb)   LMP  (LMP Unknown)   BMI 30.18 kg/m²     Physical Exam:  Constitutional: Alert, no distress, well-groomed.  Skin: No rashes in visible areas.  Eye: Round. Conjunctiva clear, lNo icterus.   ENMT: Lips pink without lesions, good dentition, moist mucous membranes. Phonation normal.  Neck: No masses, no thyromegaly. Moves freely without pain.  CV: Pulse as reported by patient  Respiratory: Unlabored respiratory effort, no cough or audible wheeze  Psych: Alert and oriented x3, normal affect and mood.          Assessment and Plan.   60 y.o. female with the following issues.    1. Seizure disorder (HCC)  Stable.  Asymptomatic.  Last seizure episode was 9 years ago  Continue on Trileptal 300 mg twice a " day.  Continue follow-up with neurology Dr. Smith.    2. History of cerebrovascular accident (CVA) with residual deficit  Has been having mild left side weakness since she had a stroke 9 years ago after craniotomy due to AVM.  Continue on statin    3. S/P partial colectomy  History of partial colectomy due to obstruction.  Currently asymptomatic.    4. Vitamin D deficiency disease  Vitamin D level was checked in 2019 was 17.  Today patient was treated with large dose vitamin D for 3 months at that time.  We will repeat vitamin D level.    5. Hyperlipidemia, unspecified hyperlipidemia type  He has been tolerating the statin. Denies muscle pain LFTs has been normal  Continue on atorvastatin 40 mg daily.    6. Smoker  Smoke about a pack a day for more than currently asymptomatic.    7. Elevated blood pressure reading   No history of hypertension, however blood pressure today is high.  Patient is asymptomatic.  Patient advised to check her blood pressure twice a day for a week and send it to me for reevaluation.

## 2021-04-08 ENCOUNTER — IMMUNIZATION (OUTPATIENT)
Dept: FAMILY PLANNING/WOMEN'S HEALTH CLINIC | Facility: IMMUNIZATION CENTER | Age: 61
End: 2021-04-08
Attending: INTERNAL MEDICINE
Payer: MEDICARE

## 2021-04-08 DIAGNOSIS — Z23 ENCOUNTER FOR VACCINATION: Primary | ICD-10-CM

## 2021-04-08 PROCEDURE — 0002A PFIZER SARS-COV-2 VACCINE: CPT

## 2021-04-08 PROCEDURE — 91300 PFIZER SARS-COV-2 VACCINE: CPT

## 2021-04-20 NOTE — ANESTHESIA QCDR
2019 [facility] Clinical Data Registry (for Quality Improvement)     Postoperative nausea/vomiting risk protocol (Adult = 18 yrs and Pediatric 3-17 yrs)- (430 and 463)  General inhalation anesthetic (NOT TIVA) with PONV risk factors: No  Provision of anti-emetic therapy with at least 2 different classes of agents: N/A  Patient DID NOT receive anti-emetic therapy and reason is documented in Medical Record: N/A    Multimodal Pain Management- (AQI59)  Patient undergoing Elective Surgery (i.e. Outpatient, or ASC, or Prescheduled Surgery prior to Hospital Admission): Yes  Use of Multimodal Pain Management, two or more drugs and/or interventions, NOT including systemic opioids: Yes   Exception: Documented allergy to multiple classes of analgesics:  N/A    PACU assessment of acute postoperative pain prior to Anesthesia Care End- Applies to Patients Age = 18- (ABG7)  Initial PACU pain score is which of the following: < 7/10  Patient unable to report pain score: N/A    Post-anesthetic transfer of care checklist/protocol to PACU/ICU- (426 and 427)  Upon conclusion of case, patient transferred to which of the following locations: PACU/Non-ICU  Use of transfer checklist/protocol: Yes  Exclusion: Service Performed in Patient Hospital Room (and thus did not require transfer): N/A    PACU Reintubation- (AQI31)  General anesthesia requiring endotracheal intubation (ETT) along with subsequent extubation in OR or PACU: Yes  Required reintubation in the PACU: No   Extubation was a planned trial documented in the medical record prior to removal of the original airway device:  N/A    Unplanned admission to ICU related to anesthesia service up through end of PACU care- (MD51)  Unplanned admission to ICU (not initially anticipated at anesthesia start time): No         
Abdominal Pain, N/V/D

## 2021-06-21 ENCOUNTER — PATIENT MESSAGE (OUTPATIENT)
Dept: MEDICAL GROUP | Facility: MEDICAL CENTER | Age: 61
End: 2021-06-21

## 2021-06-21 DIAGNOSIS — G40.909 SEIZURE DISORDER (HCC): ICD-10-CM

## 2021-06-21 DIAGNOSIS — E78.00 PURE HYPERCHOLESTEROLEMIA: ICD-10-CM

## 2021-06-21 RX ORDER — ATORVASTATIN CALCIUM 40 MG/1
TABLET, FILM COATED ORAL
Qty: 90 TABLET | Refills: 3 | Status: SHIPPED | OUTPATIENT
Start: 2021-06-21 | End: 2022-07-05 | Stop reason: SDUPTHER

## 2021-06-21 NOTE — TELEPHONE ENCOUNTER
Received request via: Pharmacy    Was the patient seen in the last year in this department? Yes    Does the patient have an active prescription (recently filled or refills available) for medication(s) requested? No     Patient was last seen on 07/01/2020 , patient has a follow up scheduled on 09/27/2021.

## 2021-06-22 RX ORDER — OXCARBAZEPINE 300 MG/1
300 TABLET, FILM COATED ORAL 2 TIMES DAILY
Qty: 180 TABLET | Refills: 3 | Status: SHIPPED | OUTPATIENT
Start: 2021-06-22 | End: 2022-06-20 | Stop reason: SDUPTHER

## 2021-07-07 ENCOUNTER — OFFICE VISIT (OUTPATIENT)
Dept: MEDICAL GROUP | Facility: MEDICAL CENTER | Age: 61
End: 2021-07-07
Payer: MEDICARE

## 2021-07-07 VITALS
WEIGHT: 155 LBS | RESPIRATION RATE: 16 BRPM | HEART RATE: 78 BPM | DIASTOLIC BLOOD PRESSURE: 82 MMHG | HEIGHT: 62 IN | SYSTOLIC BLOOD PRESSURE: 148 MMHG | BODY MASS INDEX: 28.52 KG/M2 | TEMPERATURE: 98.7 F | OXYGEN SATURATION: 98 %

## 2021-07-07 DIAGNOSIS — Z12.31 SCREENING MAMMOGRAM, ENCOUNTER FOR: ICD-10-CM

## 2021-07-07 DIAGNOSIS — Z11.59 NEED FOR HEPATITIS C SCREENING TEST: ICD-10-CM

## 2021-07-07 DIAGNOSIS — I69.30 HISTORY OF CEREBROVASCULAR ACCIDENT (CVA) WITH RESIDUAL DEFICIT: ICD-10-CM

## 2021-07-07 DIAGNOSIS — I10 ESSENTIAL HYPERTENSION: ICD-10-CM

## 2021-07-07 DIAGNOSIS — Z12.11 COLON CANCER SCREENING: ICD-10-CM

## 2021-07-07 DIAGNOSIS — E55.9 VITAMIN D DEFICIENCY: ICD-10-CM

## 2021-07-07 DIAGNOSIS — G40.909 SEIZURE DISORDER (HCC): ICD-10-CM

## 2021-07-07 PROCEDURE — 99214 OFFICE O/P EST MOD 30 MIN: CPT | Performed by: FAMILY MEDICINE

## 2021-07-07 RX ORDER — LOSARTAN POTASSIUM 50 MG/1
50 TABLET ORAL DAILY
Qty: 90 TABLET | Refills: 3 | Status: SHIPPED
Start: 2021-07-07 | End: 2022-02-02

## 2021-07-07 NOTE — PROGRESS NOTES
CC: Hypertension, history of stroke, seizure disorder, vitamin D deficiency    HPI:   Rubi presents today to discuss the following medical issues:    Essential hypertension  Patient has been checking her blood pressure at home has always been above 150 /90.  However patient has been asymptomatic.  Denies any headache, chest pain, shortness of breath.  Patient is currently not on medication.    History of cerebrovascular accident (CVA)   Patient has been having mild left side weakness since she had a stroke 9 years ago after craniotomy due to AVM.  Currently on atorvastatin 40 mg daily     Seizure disorder (HCC)  Patient's last seizure episode was 9 years ago while she was having craniotomy due to arteriovenous malformation in the brain.  She has been on Trileptal 300 mg twice a day.  She has been following up with neurology Dr. Smith.    Vitamin D deficiency disease  Patient's Vitamin D level was checked in 2019 was 17.    She is currently on vitamin D 5000 unit daily.  Has been asymptomatic.      Due for colonoscopy, wants to do the fit test  Due for mammogram.        Patient Active Problem List    Diagnosis Date Noted   • Nocturnal hypoxia 11/26/2019   • Seizure disorder (HCC) 08/20/2018   • Vitamin D deficiency disease 07/05/2016   • AVM (arteriovenous malformation) 06/29/2016   • S/P partial colectomy 06/29/2016   • Left hemiplegia (HCC) 06/29/2016   • History of cerebrovascular accident (CVA) with residual deficit 06/29/2016   • Smoker 06/29/2016       Current Outpatient Medications   Medication Sig Dispense Refill   • losartan (COZAAR) 50 MG Tab Take 1 tablet by mouth every day. 90 tablet 3   • OXcarbazepine (TRILEPTAL) 300 MG Tab Take 1 tablet by mouth 2 times a day. 180 tablet 3   • atorvastatin (LIPITOR) 40 MG Tab TAKE ONE TABLET BY MOUTH EVERY NIGHT AT BEDTIME 90 tablet 3     No current facility-administered medications for this visit.         Allergies as of 07/07/2021   • (No Known Allergies)  "       ROS: Denies any chest pain, Shortness of breath, Changes bowel or bladder, Lower extremity edema.    Physical Exam:  /82 (BP Location: Left arm)   Pulse 78   Temp 37.1 °C (98.7 °F)   Resp 16   Ht 1.575 m (5' 2\")   Wt 70.3 kg (155 lb)   LMP  (LMP Unknown)   SpO2 98%   BMI 28.35 kg/m²   Gen.: Well-developed, well-nourished, no apparent distress,pleasant and cooperative with the examination  Skin:  Warm and dry with good turgor. No rashes or suspicious lesions in visible areas  HEENT:Sinuses nontender with palpation, TMs clear, nares patent with pink mucosa and clear rhinorrhea,no septal deviation ,polyps or lesions. lips without lesions, oropharynx clear.  Neck: Trachea midline,no masses or adenopathy. No JVD.  Cor: Regular rate and rhythm without murmur, gallop or rub.  Lungs: Respirations unlabored.Clear to auscultation with equal breath sounds bilaterally. No wheezes, rhonchi.  Extremities: No cyanosis, clubbing or edema.      Assessment and Plan.   60 y.o. female     1. Essential hypertension  Patient has been checking her blood pressure at home has always been above 150 /90.  However patient has been asymptomatic.  Denies any headache, chest pain, shortness of breath.    I will start patient on losartan 50 mg daily.    - CBC WITH DIFFERENTIAL; Future  - Comp Metabolic Panel; Future  - Lipid Profile; Future  - TSH; Future  - losartan (COZAAR) 50 MG Tab; Take 1 tablet by mouth every day.  Dispense: 90 tablet; Refill: 3    2. History of cerebrovascular accident (CVA) with residual deficit  Stable.  Continue on statin.  Proper control of blood pressure    3. Seizure disorder (HCC)  Stable.  Asymptomatic.  Doing fine on Trileptal  Continue follow-up with neurology    4. Vitamin D deficiency  Patient has been on vitamin D 5000 daily.  Will check vitamin D level.    - VITAMIN D,25 HYDROXY; Future    5. Colon cancer screening  Due for colonoscopy, wants to do the fit test    - OCCULT BLOOD FECES " IMMUNOASSAY; Future    6. Screening mammogram, encounter for  Due for mammogram.    - MA-SCREENING MAMMO BILAT W/CAD; Future    7. Need for hepatitis C screening test    - HEP C VIRUS ANTIBODY; Future

## 2021-09-27 ENCOUNTER — APPOINTMENT (OUTPATIENT)
Dept: NEUROLOGY | Facility: MEDICAL CENTER | Age: 61
End: 2021-09-27
Attending: PSYCHIATRY & NEUROLOGY
Payer: MEDICARE

## 2021-10-27 ENCOUNTER — PATIENT MESSAGE (OUTPATIENT)
Dept: MEDICAL GROUP | Facility: MEDICAL CENTER | Age: 61
End: 2021-10-27

## 2021-12-21 NOTE — PROGRESS NOTES
Chief Complaint   Patient presents with   • Follow-Up     Seizures       Problem List Items Addressed This Visit     None      Visit Diagnoses     Localization-related epilepsy (HCC)        Relevant Orders    OXCARBAZEPINE    Aneurysm (HCC)        Relevant Orders    MR-MRA HEAD-W/O    Nonruptured cerebral aneurysm        Relevant Orders    MR-MRA HEAD-W/O    Encounter for smoking cessation counseling        Continuous dependence on cigarette smoking        History of stroke        History of arteriovenous malformation (AVM)              History of present illness:  Rubi Urbina 61 y.o. female presents today with Bill,  to establish care with me. Used to see Dr. Perez. Last seen in 2020    They report that she has hx of R AVM s/p resection and stroke with residual deficit of L spastic hemiparesis. Seizure symptoms occurred after her stroke in 2007 described as twitching in the LUE only without impaired awareness. Never had staring spells and GTCs. She hasn't tried any other ASMs besides from trileptal. Currently on 300mg BID. She hasn't had any jerks since 2012 or 2013 per pt. No triggers that they know of. No side effects. Mood is stable.  believes she has mood changes at times and worries a lot now. She denies SI or HI. She is not driving. Memory is not as good either so she forgets to take her meds at times. No alcohol use. She smokes ~1.5 pack a day. No recreational drug use. She is aware of the incidental aneurysm mentioned by Dr. Perez. She is not taking asa. She mentioned her BP being high today and  reports she was stressed on the way here. She denies any headache or visual disturbance. No other concerns.        Past medical history:   Past Medical History:   Diagnosis Date   • Asthma     exercise induced was a child, last attack was a child, resolved when moved to [place]   • AVM (arteriovenous malformation) rut/durga/ann, 2008    Right-sided temp AVM, kishan and laser  treated, too large for surgery. had treatment in .   • Dental disorder     upper/lower partials   • High cholesterol    • Migraine     resolved with AVM treatment   • Partial bowel obstruction (HCC)     had sigmoid colectomy   • Seizure disorder (HCC)     reports last 2005ish   • Snoring     no sleep study   • Stroke (HCC) 2005ish    left sided weakness       Past surgical history:   Past Surgical History:   Procedure Laterality Date   • VENTRAL HERNIA REPAIR  6/27/2019    Procedure: REPAIR, HERNIA, VENTRAL;  Surgeon: Leila Jimenez M.D.;  Location: SURGERY [facility];  Service: General   • COLON RESECTION      colostomy, colostomy take d   • COLOSTOMY TAKEDOWN     • CRANIOTOMY  2005ish    AVM treatment   • TONSILLECTOMY     • TUBAL COAGULATION LAPAROSCOPIC BILATERAL         Family history:   Family History   Problem Relation Age of Onset   • Lung Disease Mother         asthma   • Heart Attack Mother    • Diabetes Father    • Cancer Neg Hx        Social history:   Social History     Socioeconomic History   • Marital status:      Spouse name: Not on file   • Number of children: Not on file   • Years of education: Not on file   • Highest education level: Not on file   Occupational History   • Not on file   Tobacco Use   • Smoking status: Current Every Day Smoker     Packs/day: 1.00     Years: 30.00     Pack years: 30.00     Types: Cigarettes   • Smokeless tobacco: Never Used   • Tobacco comment: Started smoking at age 16   Vaping Use   • Vaping Use: Never used   Substance and Sexual Activity   • Alcohol use: Yes   • Drug use: No   • Sexual activity: Never     Partners: Male     Birth control/protection: Post-Menopausal   Other Topics Concern   • Not on file   Social History Narrative   • Not on file     Social Determinants of Health     Financial Resource Strain:    • Difficulty of Paying Living Expenses: Not on file   Food Insecurity:    • Worried About Running Out of Food in the Last Year: Not on file    • Ran Out of Food in the Last Year: Not on file   Transportation Needs:    • Lack of Transportation (Medical): Not on file   • Lack of Transportation (Non-Medical): Not on file   Physical Activity:    • Days of Exercise per Week: Not on file   • Minutes of Exercise per Session: Not on file   Stress:    • Feeling of Stress : Not on file   Social Connections:    • Frequency of Communication with Friends and Family: Not on file   • Frequency of Social Gatherings with Friends and Family: Not on file   • Attends Gnosticist Services: Not on file   • Active Member of Clubs or Organizations: Not on file   • Attends Club or Organization Meetings: Not on file   • Marital Status: Not on file   Intimate Partner Violence:    • Fear of Current or Ex-Partner: Not on file   • Emotionally Abused: Not on file   • Physically Abused: Not on file   • Sexually Abused: Not on file   Housing Stability:    • Unable to Pay for Housing in the Last Year: Not on file   • Number of Places Lived in the Last Year: Not on file   • Unstable Housing in the Last Year: Not on file       Current medications:   Current Outpatient Medications   Medication   • losartan (COZAAR) 50 MG Tab   • OXcarbazepine (TRILEPTAL) 300 MG Tab   • atorvastatin (LIPITOR) 40 MG Tab     No current facility-administered medications for this visit.       Medication Allergy:  No Known Allergies      Review of systems:     General: Denies fevers or chills, or nightsweats, or generalized fatigue.    Head: Denies headaches or dizziness or lightheadedness  EENT: Denies vision changes, vision loss or pain, nasal secretion, nasal bleeding, difficulty swallowing, hearing loss, tinnitus, vertigo, ear pain  Respiratory: Denies shortness of breath, cough, sputum, or wheezing  Cardiac: Denies chest pain, palpitations, edema or syncope  Gastrointestinal: Denies nausea, vomiting, no abdominal pain or change in bowel habits, no melena or hematochezia  Urinary: Denies dysuria, frequency,  "hesitancy, or incontinence.  Dermatologic:  Denies new rash  Musculoskeletal: Denies muscle pain or swelling, no atrophy, no neck and back pain or stiffness.   Neurologic: Denies facial droopiness,  ataxia, change in speech or language, memory loss, abnormal movements, seizures, loss of consciousness, or episodes of confusion. +muscle weakness L side, paresthesias L side  Psychiatric: Denies  depression, mood swings, suicidal or homicidal thoughts. +anxiety       Physical examination:   Vitals:    01/05/22 1031 01/05/22 1046   BP: (!) 180/84 (!) 168/80   BP Location: Right arm Right arm   Patient Position: Sitting Sitting   BP Cuff Size: Adult Adult   Pulse: 80    Temp: 36.9 °C (98.4 °F)    TempSrc: Temporal    SpO2: 97%    Weight: 70.3 kg (155 lb)    Height: 1.575 m (5' 2\")      General: Patient in no acute distress, pleasant and cooperative.  HEENT: Normocephalic, no signs of acute trauma.   moist conjunctivae. Nares are patent. Oropharynx clear without lesions and normal  hard and soft palates.   Neck: Supple. There is normal range of motion. No carotid bruits.   Resp: clear to auscultation bilaterally. No wheezes or crackles.   CV: RRR, no murmurs.   Skin: no signs of acute rashes or trauma.   Musculoskeletal: joints exhibit full range of motion in R extremities  Psychiatric: No hallucinatory behavior. No symptoms of depression or suicidal ideation. Mood and affect appear normal on exam.     NEUROLOGICAL EXAM:   Mental status, orientation: Awake, alert and fully oriented.   Speech and language: speech is clear and fluent. The patient is able to name, repeat and comprehend.   Memory: There is intact recollection of recent and remote events.   Cranial nerve exam:   CN I: Not examined   CN II: PERRL.   CN III, IV, VI: EOMI; no nystagmus   CN V: Facial sensation intact bilaterally   CN VII: face symmetric   CN VIII: hearing intact to finger rub bilaterally   CN IX, X: palate elevates symmetrically   CN XI: Symmetric " shoulder shrug  CN XII: tongue midline.   Motor exam: L spastic hemiparesis  Sensory exam reveals decreased sense of light touch in L side.   Deep tendon reflexes:  Brisk L side, 2+ throughout.   Coordination: Mild diadochokinesia in L   Gait: hemiplegic gait       ANCILLARY DATA REVIEWED:       Lab Data Review:  Reviewed in chart.     Records reviewed:   Reviewed in chart.    Imaging:   MRI brain w & w/o 11/14/2019  1.  Stable appearance of previously treated large right frontoparietal arteriovenous malformation with stable associated T2 hyperintensity/gliosis. There is enhancement partially seen within the nidus, unchanged consistent with residual flow.  2.  No acute intracranial abnormality.    MRA head 11/14/2019  1.  Asymmetrically and a large right middle cerebral artery and to a lesser extent right posterior cerebral artery, related to previously treated and partially visualized right frontoparietal arteriovenous malformation.  2.  4 mm para ophthalmic distal left ICA cavernous segment aneurysm.    EEG:  Routine EEG 1/20/2020  This is this is an abnormal routine EEG recording in the awake and drowsy states. Intermittent, mild left temporal slowing noted.  The findings suggest underlying structural abnormality.  No seizures were captured during the study.  Clinical and radiological correlation is recommended.        ASSESSMENT AND PLAN:    1. Localization-related epilepsy (HCC)  - OXCARBAZEPINE    2. Aneurysm (HCC)  - MR-MRA HEAD-W/O; Future    3. Nonruptured cerebral aneurysm  - MR-MRA HEAD-W/O; Future    4. Encounter for smoking cessation counseling    5. Continuous dependence on cigarette smoking    6. History of stroke    7. History of arteriovenous malformation (AVM)          CLINICAL DISCUSSION:  Possible structural epilepsy s/p R AVM resection and apparently also had a stroke (R IP) during one of the AVM procedures in 2007. Has residual deficit of spastic L hemiparesis and incoordination. She only had  focal seizures without impaired awareness described as L myoclonic jerks in LUE. Never had staring or GTCs. Seizures have been very well controlled on trileptal 300mg BID. Has not tired other ASMs. No side effects. Recent EEG was abnormal.     Incidental finding of L ICA aneurysm and according to Dr. Perez's note, he wants to do serial imaging annually.     Pt does not drink alcohol. She is a chronic smoker (~1.5packs a day). No recreational drug use.     Mood is stable. Has anxiety and worries a lot.  No suicidal or homicidal thoughts. She does not think she needs to see a therapist.     Past ASM's: none    Current ASM's: Trileptal 300mg BID.       Plan:  - Continue ASM.     -repeat MRA head    - Discussed avoidance of spell/sz triggers: alcohol, sleep deprivation, and stress.    - Discussed Vit D supplementation. Recommended taking 2000-5000u daily.    - Discussed driving restrictions. She is not driving.     -Recommended to monitor BP daily and see PCP regarding high BP.  Given ER precaution if SBP is persistent in the 180's or if it goes higher. Regular f/u with PCP for monitoring and management of stroke risk factors.     -Discussed smoking cessation >3mins. Pt refused to quit but verbalized understanding.     -Labs to be checked for next appointment: Discussed importance of blood work. PCP has pending blood work. Will add trileptal level.         FOLLOW-UP:   Return in about 3 months (around 4/5/2022).      EDUCATION AND COUNSELING:  -Education was provided to the patient and/or family regarding diagnosis and prognosis. The chronic and unpredictable nature of the condition were discussed. There is increased risk for additional events, which may carry potential for significant injuries and death. Discussed frequent seizure triggers: sleep deprivation, medication non-compliance, use of illegal drugs/alcohol, stress, and others.   -We reviewed in detail the current antiepileptic regimen. Potential side effects  of antiepileptics were discussed at length, including but no limited to: hypersensitivity reactions (rash and others, some of which can be fatal), visual field changes (some of which may be irreversible), glaucoma, diplopia, kidney stones, osteopenia/osteoporosis/bone fractures, hyperthermia/anhydrosis, hyponatremia, tremors/abnormal movements, ataxia, dizziness, fatigue, increased risk for falls, risk for cardiac arrhythmias/syncope, gastrointestinal side effects(hepatitis, pancreatitis, gastritis, ulcers), gingival hypertrophy/bleeding, drowsiness, sedation, anxiety/nervousness, increased risk for suicide, increased risk for depression, and psychosis.   -We also reviewed drug-drug interactions and their potential effect on seizure control and medication side effects.    -Recommend chronic vitamin D supplementation and regular exercise (if not contraindicated).   -Patient/family educated on risk for SUDEP (Sudden Death in Epilepsy). Counseling was provided on the importance of strict medication and follow up compliance. The patient/family understand the risks associated with non-adherence with the medical plan as outlined, including but not limited to an increased risk for breakthrough seizures, which may contribute to injuries, disability, status epilepticus, and even death.   -Counseling was also provided on potential effects of alcohol and other drugs, which may lower seizure threshold and/or affect the metabolism of antiepileptic drugs. We recommend avoidance of alcohol and illegal drugs.  -Avoid sleep deprivation.   -We extensively discussed the aspects related to safety in drivers who suffer from epilepsy. The patient is encourage to report to the Division of Motor Vehicles of any condition and/or spells related to confusion, disorientation, and/or loss of awareness and/or loss of consciousness; as these may pose a safety issue if they occur while operating a motor vehicle. The patient and/or family are  ultimately responsible for exercising caution and abiding to regulations in place.   -Other seizure precautions were discussed at length, including no diving, no skydiving, no climbing or exposure to unprotected heights, no unsupervised swimming, no Jacuzzi or bathing in bathtubs or deep bodies of water. The patient/family have been advised about risks for operating any machinery while suffering from seizures / syncope / epilepsy and/or while taking antiepileptic drugs.   -The patient understands and agrees that due to the complexity of his/her diagnosis, results of any testing and further recommendations will typically be discussed/made during a face to face encounter in my office. The patient and/or family further understands it is their responsibility to keep proper follow up.     Patient/family agree with plan, as outlined.         Sandra Antunez, MSN, APRN, FNP-C  [facility]  Office: [phone]  Fax: [phone]    BILLING DOCUMENTATION:   Total time spent including chart review before and after visit was 62min. Over 50% of the time of the visit today was spent on counseling and or coordination of care wtih the patient and/or family, with greater than 50% of the total discussing my assessment and plan as stated above.

## 2022-01-05 ENCOUNTER — OFFICE VISIT (OUTPATIENT)
Dept: NEUROLOGY | Facility: MEDICAL CENTER | Age: 62
End: 2022-01-05
Attending: NURSE PRACTITIONER
Payer: MEDICARE

## 2022-01-05 VITALS
HEIGHT: 62 IN | HEART RATE: 80 BPM | SYSTOLIC BLOOD PRESSURE: 168 MMHG | TEMPERATURE: 98.4 F | DIASTOLIC BLOOD PRESSURE: 80 MMHG | OXYGEN SATURATION: 97 % | WEIGHT: 155 LBS | BODY MASS INDEX: 28.52 KG/M2

## 2022-01-05 DIAGNOSIS — I72.9 ANEURYSM (HCC): ICD-10-CM

## 2022-01-05 DIAGNOSIS — Z71.6 ENCOUNTER FOR SMOKING CESSATION COUNSELING: ICD-10-CM

## 2022-01-05 DIAGNOSIS — I67.1 NONRUPTURED CEREBRAL ANEURYSM: ICD-10-CM

## 2022-01-05 DIAGNOSIS — G40.109 LOCALIZATION-RELATED EPILEPSY (HCC): ICD-10-CM

## 2022-01-05 DIAGNOSIS — Z87.74 HISTORY OF ARTERIOVENOUS MALFORMATION (AVM): ICD-10-CM

## 2022-01-05 DIAGNOSIS — Z86.73 HISTORY OF STROKE: ICD-10-CM

## 2022-01-05 DIAGNOSIS — F17.210 CONTINUOUS DEPENDENCE ON CIGARETTE SMOKING: ICD-10-CM

## 2022-01-05 PROCEDURE — 99215 OFFICE O/P EST HI 40 MIN: CPT | Performed by: NURSE PRACTITIONER

## 2022-01-05 PROCEDURE — 99211 OFF/OP EST MAY X REQ PHY/QHP: CPT | Performed by: NURSE PRACTITIONER

## 2022-01-05 ASSESSMENT — PATIENT HEALTH QUESTIONNAIRE - PHQ9: CLINICAL INTERPRETATION OF PHQ2 SCORE: 0

## 2022-01-10 ENCOUNTER — HOSPITAL ENCOUNTER (OUTPATIENT)
Dept: LAB | Facility: MEDICAL CENTER | Age: 62
End: 2022-01-10
Attending: FAMILY MEDICINE
Payer: MEDICARE

## 2022-01-10 ENCOUNTER — HOSPITAL ENCOUNTER (OUTPATIENT)
Dept: LAB | Facility: MEDICAL CENTER | Age: 62
End: 2022-01-10
Attending: NURSE PRACTITIONER
Payer: MEDICARE

## 2022-01-10 ENCOUNTER — OFFICE VISIT (OUTPATIENT)
Dept: MEDICAL GROUP | Facility: MEDICAL CENTER | Age: 62
End: 2022-01-10
Payer: MEDICARE

## 2022-01-10 VITALS
TEMPERATURE: 97.8 F | SYSTOLIC BLOOD PRESSURE: 170 MMHG | OXYGEN SATURATION: 97 % | DIASTOLIC BLOOD PRESSURE: 80 MMHG | WEIGHT: 150 LBS | HEIGHT: 62 IN | RESPIRATION RATE: 16 BRPM | HEART RATE: 67 BPM | BODY MASS INDEX: 27.6 KG/M2

## 2022-01-10 DIAGNOSIS — I69.30 HISTORY OF CEREBROVASCULAR ACCIDENT (CVA) WITH RESIDUAL DEFICIT: ICD-10-CM

## 2022-01-10 DIAGNOSIS — I10 ESSENTIAL HYPERTENSION: ICD-10-CM

## 2022-01-10 DIAGNOSIS — Z11.59 NEED FOR HEPATITIS C SCREENING TEST: ICD-10-CM

## 2022-01-10 DIAGNOSIS — E55.9 VITAMIN D DEFICIENCY: ICD-10-CM

## 2022-01-10 DIAGNOSIS — G40.909 SEIZURE DISORDER (HCC): ICD-10-CM

## 2022-01-10 DIAGNOSIS — F17.200 TOBACCO DEPENDENCE: ICD-10-CM

## 2022-01-10 DIAGNOSIS — I10 PRIMARY HYPERTENSION: ICD-10-CM

## 2022-01-10 LAB
25(OH)D3 SERPL-MCNC: 41 NG/ML (ref 30–100)
ALBUMIN SERPL BCP-MCNC: 4.7 G/DL (ref 3.2–4.9)
ALBUMIN/GLOB SERPL: 1.4 G/DL
ALP SERPL-CCNC: 142 U/L (ref 30–99)
ALT SERPL-CCNC: 12 U/L (ref 2–50)
ANION GAP SERPL CALC-SCNC: 14 MMOL/L (ref 7–16)
AST SERPL-CCNC: 13 U/L (ref 12–45)
BASOPHILS # BLD AUTO: 1.1 % (ref 0–1.8)
BASOPHILS # BLD: 0.09 K/UL (ref 0–0.12)
BILIRUB SERPL-MCNC: 0.4 MG/DL (ref 0.1–1.5)
BUN SERPL-MCNC: 10 MG/DL (ref 8–22)
CALCIUM SERPL-MCNC: 10 MG/DL (ref 8.5–10.5)
CHLORIDE SERPL-SCNC: 103 MMOL/L (ref 96–112)
CHOLEST SERPL-MCNC: 147 MG/DL (ref 100–199)
CO2 SERPL-SCNC: 22 MMOL/L (ref 20–33)
CREAT SERPL-MCNC: 0.82 MG/DL (ref 0.5–1.4)
EOSINOPHIL # BLD AUTO: 0.11 K/UL (ref 0–0.51)
EOSINOPHIL NFR BLD: 1.4 % (ref 0–6.9)
ERYTHROCYTE [DISTWIDTH] IN BLOOD BY AUTOMATED COUNT: 45.7 FL (ref 35.9–50)
FASTING STATUS PATIENT QL REPORTED: NORMAL
GLOBULIN SER CALC-MCNC: 3.4 G/DL (ref 1.9–3.5)
GLUCOSE SERPL-MCNC: 78 MG/DL (ref 65–99)
HCT VFR BLD AUTO: 56.6 % (ref 37–47)
HCV AB SER QL: NORMAL
HDLC SERPL-MCNC: 45 MG/DL
HGB BLD-MCNC: 18.2 G/DL (ref 12–16)
IMM GRANULOCYTES # BLD AUTO: 0.03 K/UL (ref 0–0.11)
IMM GRANULOCYTES NFR BLD AUTO: 0.4 % (ref 0–0.9)
LDLC SERPL CALC-MCNC: 64 MG/DL
LYMPHOCYTES # BLD AUTO: 1.79 K/UL (ref 1–4.8)
LYMPHOCYTES NFR BLD: 22.8 % (ref 22–41)
MCH RBC QN AUTO: 28.4 PG (ref 27–33)
MCHC RBC AUTO-ENTMCNC: 32.2 G/DL (ref 33.6–35)
MCV RBC AUTO: 88.4 FL (ref 81.4–97.8)
MONOCYTES # BLD AUTO: 0.42 K/UL (ref 0–0.85)
MONOCYTES NFR BLD AUTO: 5.4 % (ref 0–13.4)
NEUTROPHILS # BLD AUTO: 5.41 K/UL (ref 2–7.15)
NEUTROPHILS NFR BLD: 68.9 % (ref 44–72)
NRBC # BLD AUTO: 0 K/UL
NRBC BLD-RTO: 0 /100 WBC
PLATELET # BLD AUTO: 277 K/UL (ref 164–446)
PMV BLD AUTO: 9 FL (ref 9–12.9)
POTASSIUM SERPL-SCNC: 4.2 MMOL/L (ref 3.6–5.5)
PROT SERPL-MCNC: 8.1 G/DL (ref 6–8.2)
RBC # BLD AUTO: 6.4 M/UL (ref 4.2–5.4)
SODIUM SERPL-SCNC: 139 MMOL/L (ref 135–145)
TRIGL SERPL-MCNC: 191 MG/DL (ref 0–149)
TSH SERPL DL<=0.005 MIU/L-ACNC: 1.36 UIU/ML (ref 0.38–5.33)
WBC # BLD AUTO: 7.9 K/UL (ref 4.8–10.8)

## 2022-01-10 PROCEDURE — 36415 COLL VENOUS BLD VENIPUNCTURE: CPT

## 2022-01-10 PROCEDURE — 84443 ASSAY THYROID STIM HORMONE: CPT

## 2022-01-10 PROCEDURE — 86803 HEPATITIS C AB TEST: CPT

## 2022-01-10 PROCEDURE — 80061 LIPID PANEL: CPT

## 2022-01-10 PROCEDURE — 80053 COMPREHEN METABOLIC PANEL: CPT

## 2022-01-10 PROCEDURE — 99214 OFFICE O/P EST MOD 30 MIN: CPT | Performed by: FAMILY MEDICINE

## 2022-01-10 PROCEDURE — 85025 COMPLETE CBC W/AUTO DIFF WBC: CPT

## 2022-01-10 PROCEDURE — 80183 DRUG SCRN QUANT OXCARBAZEPIN: CPT

## 2022-01-10 PROCEDURE — 82306 VITAMIN D 25 HYDROXY: CPT

## 2022-01-10 NOTE — PROGRESS NOTES
CC: Elevated blood pressure, seizure disorder, history of hemorrhagic stroke, tobacco dependence she    HPI:   Rubi presents today to discuss the following medical issues:    Essential hypertension  Patient has elevated blood pressure however she has been asymptomatic, she Denies any headache, chest pain, shortness of breath.  She is currently on losartan 75 mg daily.    Seizure disorder (HCC)  Patient's last seizure episode was 9 years ago while she was having craniotomy due to arteriovenous malformation in the brain.  She has been on Trileptal 300 mg twice a day.  She has been following up with neurology Dr. Smith.    History of cerebrovascular accident (CVA)   Patient has been having mild left side weakness since she had a stroke 9 years ago after craniotomy due to AVM.  Currently on atorvastatin 40 mg daily    Tobacco dependence  Patient smokes about a pack a day for more than 30 years, smoking cessation discussed, patient is not ready to quit    Patient Active Problem List    Diagnosis Date Noted   • Nocturnal hypoxia 11/26/2019   • Seizure disorder (HCC) 08/20/2018   • Vitamin D deficiency disease 07/05/2016   • AVM (arteriovenous malformation) 06/29/2016   • S/P partial colectomy 06/29/2016   • Left hemiplegia (HCC) 06/29/2016   • History of cerebrovascular accident (CVA) with residual deficit 06/29/2016   • Smoker 06/29/2016       Current Outpatient Medications   Medication Sig Dispense Refill   • Cholecalciferol (VITAMIN D3 PO) Take  by mouth.     • losartan (COZAAR) 50 MG Tab Take 1 tablet by mouth every day. 90 tablet 3   • OXcarbazepine (TRILEPTAL) 300 MG Tab Take 1 tablet by mouth 2 times a day. 180 tablet 3   • atorvastatin (LIPITOR) 40 MG Tab TAKE ONE TABLET BY MOUTH EVERY NIGHT AT BEDTIME 90 tablet 3     No current facility-administered medications for this visit.         Allergies as of 01/10/2022   • (No Known Allergies)        ROS: Denies any chest pain, Shortness of breath, Changes bowel or  "bladder, Lower extremity edema.    Physical Exam:  BP (!) 170/80 (BP Location: Right arm, Patient Position: Sitting, BP Cuff Size: Adult)   Pulse 67   Temp 36.6 °C (97.8 °F) (Temporal)   Resp 16   Ht 1.575 m (5' 2\")   Wt 68 kg (150 lb)   LMP  (LMP Unknown)   SpO2 97%   BMI 27.44 kg/m²   Gen.: Well-developed, well-nourished, no apparent distress,pleasant and cooperative with the examination  Skin:  Warm and dry with good turgor. No rashes or suspicious lesions in visible areas  HEENT:Sinuses nontender with palpation, TMs clear, nares patent with pink mucosa and clear rhinorrhea,no septal deviation ,polyps or lesions. lips without lesions, oropharynx clear.  Neck: Trachea midline,no masses or adenopathy. No JVD.  Cor: Regular rate and rhythm without murmur, gallop or rub.  Lungs: Respirations unlabored.Clear to auscultation with equal breath sounds bilaterally. No wheezes, rhonchi.  Extremities: No cyanosis, clubbing or edema.      Assessment and Plan.   61 y.o. female     1. Primary hypertension  Elevated  Will increase losartan to 100 mg daily.  Patient advised to check her BP 2 times daily for a week and send it to me,    2. Seizure disorder (HCC)  Currently stable.  Asymptomatic.  Continue on Trileptal 300 mg twice a day.    Continue follow-up with neurology Dr. Smith.    3. History of cerebrovascular accident (CVA) with residual deficit  It was hemorrhagic, having chronic residual effect, left-sided weakness  Continue on statin,  Recommend smoking cessation, patient is not ready    4. Tobacco dependence  Patient smokes about a pack a day for more than 30 years, smoking cessation discussed, patient is not ready to quit      "

## 2022-01-11 ENCOUNTER — HOSPITAL ENCOUNTER (OUTPATIENT)
Dept: RADIOLOGY | Facility: MEDICAL CENTER | Age: 62
End: 2022-01-11
Attending: NURSE PRACTITIONER
Payer: MEDICARE

## 2022-01-11 DIAGNOSIS — I67.1 NONRUPTURED CEREBRAL ANEURYSM: ICD-10-CM

## 2022-01-11 DIAGNOSIS — I72.9 ANEURYSM (HCC): ICD-10-CM

## 2022-01-11 PROCEDURE — 70544 MR ANGIOGRAPHY HEAD W/O DYE: CPT | Mod: ME

## 2022-01-13 LAB — 10OH-CARBAZEPINE SERPL-MCNC: 16 UG/ML (ref 3–35)

## 2022-01-31 ENCOUNTER — PATIENT MESSAGE (OUTPATIENT)
Dept: MEDICAL GROUP | Facility: MEDICAL CENTER | Age: 62
End: 2022-01-31

## 2022-01-31 DIAGNOSIS — I10 PRIMARY HYPERTENSION: ICD-10-CM

## 2022-01-31 RX ORDER — AMLODIPINE BESYLATE 5 MG/1
5 TABLET ORAL DAILY
Qty: 30 TABLET | Refills: 3 | Status: SHIPPED | OUTPATIENT
Start: 2022-01-31 | End: 2022-05-31 | Stop reason: SDUPTHER

## 2022-02-02 ENCOUNTER — PATIENT MESSAGE (OUTPATIENT)
Dept: MEDICAL GROUP | Facility: MEDICAL CENTER | Age: 62
End: 2022-02-02

## 2022-02-02 DIAGNOSIS — I10 PRIMARY HYPERTENSION: ICD-10-CM

## 2022-02-02 RX ORDER — LOSARTAN POTASSIUM 100 MG/1
100 TABLET ORAL DAILY
Qty: 90 TABLET | Refills: 2 | Status: SHIPPED | OUTPATIENT
Start: 2022-02-02 | End: 2022-10-31 | Stop reason: SDUPTHER

## 2022-02-18 VITALS — SYSTOLIC BLOOD PRESSURE: 124 MMHG | DIASTOLIC BLOOD PRESSURE: 77 MMHG

## 2022-03-24 NOTE — PROGRESS NOTES
No chief complaint on file.      Problem List Items Addressed This Visit    None         Interim History:  Rubi Urbina 61 y.o. female presents today for ***    They report that she has hx of R AVM s/p resection and stroke with residual deficit of L spastic hemiparesis. Seizure symptoms occurred after her stroke in 2007 described as twitching in the LUE only without impaired awareness. Never had staring spells and GTCs. She hasn't tried any other ASMs besides from trileptal. Currently on 300mg BID. She hasn't had any jerks since 2012 or 2013 per pt. No triggers that they know of. No side effects. Mood is stable.  believes she has mood changes at times and worries a lot now. She denies SI or HI. She is not driving. Memory is not as good either so she forgets to take her meds at times. No alcohol use. She smokes ~1.5 pack a day. No recreational drug use. She is aware of the incidental aneurysm mentioned by Dr. Perez. She is not taking asa. She mentioned her BP being high today and  reports she was stressed on the way here. She denies any headache or visual disturbance. No other concerns.        Past medical history:   Past Medical History:   Diagnosis Date   • Asthma     exercise induced was a child, last attack was a child, resolved when moved to [place]   • AVM (arteriovenous malformation) rut/durga/ann, 2008    Right-sided temp AVM, kishan and laser treated, too large for surgery. had treatment in .   • Dental disorder     upper/lower partials   • High cholesterol    • Migraine     resolved with AVM treatment   • Partial bowel obstruction (HCC)     had sigmoid colectomy   • Seizure disorder (HCC)     reports last 2005ish   • Snoring     no sleep study   • Stroke (HCC) 2005ish    left sided weakness       Past surgical history:   Past Surgical History:   Procedure Laterality Date   • VENTRAL HERNIA REPAIR  6/27/2019    Procedure: REPAIR, HERNIA, VENTRAL;  Surgeon: Leila Jimenez M.D.;   Location: SURGERY [facility];  Service: General   • COLON RESECTION      colostomy, colostomy take d   • COLOSTOMY TAKEDOWN     • CRANIOTOMY  2005ish    AVM treatment   • TONSILLECTOMY     • TUBAL COAGULATION LAPAROSCOPIC BILATERAL         Family history:   Family History   Problem Relation Age of Onset   • Lung Disease Mother         asthma   • Heart Attack Mother    • Diabetes Father    • Cancer Neg Hx        Social history:   Social History     Socioeconomic History   • Marital status:      Spouse name: Not on file   • Number of children: Not on file   • Years of education: Not on file   • Highest education level: Not on file   Occupational History   • Not on file   Tobacco Use   • Smoking status: Current Every Day Smoker     Packs/day: 1.00     Years: 30.00     Pack years: 30.00     Types: Cigarettes   • Smokeless tobacco: Never Used   • Tobacco comment: Started smoking at age 16   Vaping Use   • Vaping Use: Never used   Substance and Sexual Activity   • Alcohol use: Yes   • Drug use: No   • Sexual activity: Never     Partners: Male     Birth control/protection: Post-Menopausal   Other Topics Concern   • Not on file   Social History Narrative   • Not on file     Social Determinants of Health     Financial Resource Strain: Not on file   Food Insecurity: Not on file   Transportation Needs: Not on file   Physical Activity: Not on file   Stress: Not on file   Social Connections: Not on file   Intimate Partner Violence: Not on file   Housing Stability: Not on file       Current medications:   Current Outpatient Medications   Medication   • losartan (COZAAR) 100 MG Tab   • amLODIPine (NORVASC) 5 MG Tab   • Cholecalciferol (VITAMIN D3 PO)   • OXcarbazepine (TRILEPTAL) 300 MG Tab   • atorvastatin (LIPITOR) 40 MG Tab     No current facility-administered medications for this visit.       Medication Allergy:  No Known Allergies      Review of systems:     General: Denies fevers or chills, or nightsweats, or  generalized fatigue.    Head: Denies headaches or dizziness or lightheadedness  EENT: Denies vision changes, vision loss or pain, nasal secretion, nasal bleeding, difficulty swallowing, hearing loss, tinnitus, vertigo, ear pain  Endocdrinologic: Denies sweating, cold or heat intolerance. No polyuria or polydipsia.   Respiratory: Denies shortness of breath, cough, sputum, or wheezing  Cardiac: Denies chest pain, palpitations, edema or syncope  Gastrointestinal: Denies nausea, vomiting, no abdominal pain or change in bowel habits, no melena or hematochezia  Urinary: Denies dysuria, frequency, hesitancy, or incontinence.  Dermatologic:  Denies new rash  Musculoskeletal: Denies muscle pain or swelling, no atrophy, no neck and back pain or stiffness.   Neurologic: Denies facial droopiness, muscle weakness (focal or generalized), paresthesias, ataxia, change in speech or language, memory loss, abnormal movements, seizures, loss of consciousness, or episodes of confusion.   Psychiatric: Denies anxiety, depression, mood swings, suicidal or homicidal thoughts       Physical examination:   There were no vitals filed for this visit.  General: Patient in no acute distress, pleasant and cooperative.  HEENT: Normocephalic, no signs of acute trauma.   moist conjunctivae. Nares are patent. Oropharynx clear without lesions and normal  hard and soft palates.   Neck: Supple. There is normal range of motion.   Resp: clear to auscultation bilaterally. No wheezes or crackles.   CV: RRR, no murmurs.   Abdomen: normoactive bowel sounds, soft, non distended or tender.   Skin: no signs of acute rashes or trauma.   Musculoskeletal: joints exhibit full range of motion, without any pain to palpation. There are no signs of joint or muscle swelling. There is no tenderness to deep palpation of muscles.   Psychiatric: No hallucinatory behavior. No symptoms of depression or suicidal ideation. Mood and affect appear normal on exam.     NEUROLOGICAL  EXAM:   Mental status, orientation: Awake, alert and fully oriented.   Speech and language: speech is clear and fluent. The patient is able to name, repeat and comprehend.   Memory: There is intact recollection of recent and remote events.   Cranial nerve exam:   CN I: Not examined   CN II: PERRL. Fundoscopic exam was unremarkable.  CN III, IV, VI: EOMI; no nystagmus   CN V: Facial sensation intact bilaterally   CN VII: face symmetric   CN VIII: hearing intact to finger rub bilaterally   CN IX, X: palate elevates symmetrically   CN XI: Symmetric shoulder shrug  CN XII: tongue midline. No signs of tongue biting or fasciculations   Motor exam: Strength is 5/5 in all extremities. Tone is normal. No abnormal movements were seen on exam.   Sensory exam reveals normal sense of light touch, proprioception, vibration and pinprick in all extremities.   Deep tendon reflexes:  2+ throughout. Plantar responses are flexor. There is no clonus.   Coordination: shows a normal finger-nose-finger. Normal rapidly alternating movements.   Gait: The patient was able to get up from seated position on first attempt without requiring assistance. Found to be steady when walking. Movements were fluid with normal arm swing. The patient was able to turn without difficulties or tendency to fall. Romberg exam ***      ANCILLARY DATA REVIEWED:       Lab Data Review:  Reviewed in chart. No results found for this or any previous visit (from the past 24 hour(s)).      Records reviewed:   Reviewed in chart.    Imaging:   MRI brain w & w/o 11/14/2019  1.  Stable appearance of previously treated large right frontoparietal arteriovenous malformation with stable associated T2 hyperintensity/gliosis. There is enhancement partially seen within the nidus, unchanged consistent with residual flow.  2.  No acute intracranial abnormality.     MRA head 11/14/2019  1.  Asymmetrically and a large right middle cerebral artery and to a lesser extent right posterior  cerebral artery, related to previously treated and partially visualized right frontoparietal arteriovenous malformation.  2.  4 mm para ophthalmic distal left ICA cavernous segment aneurysm.     EEG:  Routine EEG 1/20/2020  This is this is an abnormal routine EEG recording in the awake and drowsy states. Intermittent, mild left temporal slowing noted.  The findings suggest underlying structural abnormality.  No seizures were captured during the study.  Clinical and radiological correlation is recommended.        ASSESSMENT AND PLAN:    There are no diagnoses linked to this encounter.        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 Has residual deficit of spastic L hemiparesis and incoordination. She only had focal seizures without impaired awareness described as L myoclonic jerks in LUE. Never had staring or GTCs. Seizures have been very well controlled on trileptal 300mg BID. Has not tired other ASMs. No side effects. Recent EEG was abnormal.      Incidental finding of L ICA aneurysm and according to Dr. Perez's note, he wants to do serial imaging annually.      Pt does not drink alcohol. She is a chronic smoker (~1.5packs a day). No recreational drug use.      Mood is stable. Has anxiety and worries a lot.  No suicidal or homicidal thoughts. She does not think she needs to see a therapist.     Past ASM's: none     Current ASM's: Trileptal 300mg BID.         Plan:  - Continue ASM.      -repeat MRA head     - Discussed avoidance of spell/sz triggers: alcohol, sleep deprivation, and stress.     - Discussed Vit D supplementation. Recommended taking 2000-5000u daily.     - Discussed driving restrictions. She is not driving.      -Recommended to monitor BP daily and see PCP regarding high BP.  Given ER precaution if SBP is persistent in the 180's or if it goes higher. Regular f/u with PCP for monitoring and  management of stroke risk factors.      -Discussed smoking cessation >3mins. Pt refused to quit but verbalized understanding.      -Labs to be checked for next appointment: Discussed importance of blood work. PCP has pending blood work. Will add trileptal level.         FOLLOW-UP:   No follow-ups on file.      EDUCATION AND COUNSELING:  -Education was provided to the patient and/or family regarding diagnosis and prognosis. The chronic and unpredictable nature of the condition were discussed. There is increased risk for additional events, which may carry potential for significant injuries and death. Discussed frequent seizure triggers: sleep deprivation, medication non-compliance, use of illegal drugs/alcohol, stress, and others.   -We reviewed in detail the current antiepileptic regimen. Potential side effects of antiepileptics were discussed at length, including but no limited to: hypersensitivity reactions (rash and others, some of which can be fatal), visual field changes (some of which may be irreversible), glaucoma, diplopia, kidney stones, osteopenia/osteoporosis/bone fractures, hyperthermia/anhydrosis, hyponatremia, tremors/abnormal movements, ataxia, dizziness, fatigue, increased risk for falls, risk for cardiac arrhythmias/syncope, gastrointestinal side effects(hepatitis, pancreatitis, gastritis, ulcers), gingival hypertrophy/bleeding, drowsiness, sedation, anxiety/nervousness, increased risk for suicide, increased risk for depression, and psychosis.   -We also reviewed drug-drug interactions and their potential effect on seizure control and medication side effects.    -We also discussed in detail potential effects of seizures, epilepsy, and medications during pregnancy, including but not limited to fetal malformations, child developmental/intellectual disability, fetal/ risk for hemorrhages, stillbirth, maternal death, premature birth, and others. The patient/family aware that pregnancy should be  avoided, unless desired, in which case we recommend discussing with us at least a year prior to planned conception. To avoid undesired pregnancy while on antiepileptics, we recommend dual contraception.   -Folic acid 2 mg is recommended for all females in childbearing age (12-44 years of age).   -Recommend chronic vitamin D supplementation and regular exercise (if not contraindicated).   -Patient/family educated on risk for SUDEP (Sudden Death in Epilepsy). Counseling was provided on the importance of strict medication and follow up compliance. The patient/family understand the risks associated with non-adherence with the medical plan as outlined, including but not limited to an increased risk for breakthrough seizures, which may contribute to injuries, disability, status epilepticus, and even death.   -Counseling was also provided on potential effects of alcohol and other drugs, which may lower seizure threshold and/or affect the metabolism of antiepileptic drugs. We recommend avoidance of alcohol and illegal drugs.  -Avoid sleep deprivation.   -We extensively discussed the aspects related to safety in drivers who suffer from epilepsy. The patient is encourage to report to the Division of Motor Vehicles of any condition and/or spells related to confusion, disorientation, and/or loss of awareness and/or loss of consciousness; as these may pose a safety issue if they occur while operating a motor vehicle. The patient and/or family are ultimately responsible for exercising caution and abiding to regulations in place.   -Other seizure precautions were discussed at length, including no diving, no skydiving, no climbing or exposure to unprotected heights, no unsupervised swimming, no Jacuzzi or bathing in bathtubs or deep bodies of water. The patient/family have been advised about risks for operating any machinery while suffering from seizures / syncope / epilepsy and/or while taking antiepileptic drugs.   -The patient  understands and agrees that due to the complexity of his/her diagnosis, results of any testing and further recommendations will typically be discussed/made during a face to face encounter in my office. The patient and/or family further understands it is their responsibility to keep proper follow up.     Patient/family agree with plan, as outlined.         Sandra Antunez, MSN, APRN, FNP-C  [facility]  Office: [phone]  Fax: [phone]

## 2022-04-11 ENCOUNTER — APPOINTMENT (OUTPATIENT)
Dept: NEUROLOGY | Facility: MEDICAL CENTER | Age: 62
End: 2022-04-11
Attending: NURSE PRACTITIONER
Payer: MEDICARE

## 2022-05-31 DIAGNOSIS — I10 PRIMARY HYPERTENSION: ICD-10-CM

## 2022-05-31 RX ORDER — AMLODIPINE BESYLATE 5 MG/1
5 TABLET ORAL DAILY
Qty: 30 TABLET | Refills: 3 | Status: SHIPPED | OUTPATIENT
Start: 2022-05-31 | End: 2022-10-05 | Stop reason: SDUPTHER

## 2022-06-20 DIAGNOSIS — G40.909 SEIZURE DISORDER (HCC): ICD-10-CM

## 2022-06-20 RX ORDER — OXCARBAZEPINE 300 MG/1
300 TABLET, FILM COATED ORAL 2 TIMES DAILY
Qty: 180 TABLET | Refills: 3 | Status: SHIPPED | OUTPATIENT
Start: 2022-06-20 | End: 2023-04-24 | Stop reason: SDUPTHER

## 2022-08-09 ENCOUNTER — OFFICE VISIT (OUTPATIENT)
Dept: MEDICAL GROUP | Facility: MEDICAL CENTER | Age: 62
End: 2022-08-09
Payer: MEDICARE

## 2022-08-09 VITALS
WEIGHT: 143.74 LBS | HEIGHT: 62 IN | TEMPERATURE: 98.1 F | BODY MASS INDEX: 26.45 KG/M2 | HEART RATE: 79 BPM | OXYGEN SATURATION: 95 % | SYSTOLIC BLOOD PRESSURE: 122 MMHG | RESPIRATION RATE: 16 BRPM | DIASTOLIC BLOOD PRESSURE: 60 MMHG

## 2022-08-09 DIAGNOSIS — G40.909 SEIZURE DISORDER (HCC): ICD-10-CM

## 2022-08-09 DIAGNOSIS — I69.30 HISTORY OF CEREBROVASCULAR ACCIDENT (CVA) WITH RESIDUAL DEFICIT: ICD-10-CM

## 2022-08-09 DIAGNOSIS — I10 ESSENTIAL HYPERTENSION: ICD-10-CM

## 2022-08-09 DIAGNOSIS — F17.200 TOBACCO DEPENDENCE: ICD-10-CM

## 2022-08-09 DIAGNOSIS — M79.605 PAIN OF LEFT LOWER EXTREMITY: ICD-10-CM

## 2022-08-09 PROCEDURE — 99214 OFFICE O/P EST MOD 30 MIN: CPT | Performed by: FAMILY MEDICINE

## 2022-08-09 ASSESSMENT — FIBROSIS 4 INDEX: FIB4 SCORE: 0.83

## 2022-08-09 NOTE — PROGRESS NOTES
CC: Hypertension, seizure disorder, history of CVA, pain of her extremity/tobacco dependence    HPI:   Rubi presents today to discuss the following medical issues:    Essential hypertension  Blood pressure has been adequately controlled.  She Denies any headache, chest pain, shortness of breath.  She is currently on losartan 75 mg daily.     Seizure disorder (HCC)  Her last seizure episode was 9 years ago while she was having craniotomy due to arteriovenous malformation in the brain.  She has been on Trileptal 300 mg twice a day.  She has been following up with neurology Dr. Smith.     History of cerebrovascular accident (CVA)   Patient has been having mild left side weakness since she had a stroke 9 years ago after craniotomy due to AVM.  Currently on atorvastatin 40 mg daily    Pain of left lower extremity/ Tobacco dependence  Patient has been having left leg pain once in a while when she walks or when she stand for long time.  Has never had arterial ultrasound.  Patient is chronic smoker.  She smokes about a pack a day for more than 30 years, smoking cessation discussed, patient is not ready to quit     Patient Active Problem List    Diagnosis Date Noted   • Nocturnal hypoxia 11/26/2019   • Seizure disorder (HCC) 08/20/2018   • Vitamin D deficiency disease 07/05/2016   • AVM (arteriovenous malformation) 06/29/2016   • S/P partial colectomy 06/29/2016   • Left hemiplegia (HCC) 06/29/2016   • History of cerebrovascular accident (CVA) with residual deficit 06/29/2016   • Smoker 06/29/2016       Current Outpatient Medications   Medication Sig Dispense Refill   • atorvastatin (LIPITOR) 40 MG Tab TAKE ONE TABLET BY MOUTH EVERY NIGHT AT BEDTIME 90 Tablet 0   • OXcarbazepine (TRILEPTAL) 300 MG Tab Take 1 Tablet by mouth 2 times a day. 180 Tablet 3   • amLODIPine (NORVASC) 5 MG Tab Take 1 Tablet by mouth every day. 30 Tablet 3   • losartan (COZAAR) 100 MG Tab Take 1 Tablet by mouth every day. 90 Tablet 2   •  "Cholecalciferol (VITAMIN D3 PO) Take  by mouth.       No current facility-administered medications for this visit.         Allergies as of 08/09/2022   • (No Known Allergies)        ROS: Denies any chest pain, Shortness of breath, Changes bowel or bladder, Lower extremity edema.    Physical Exam:  /60 (BP Location: Right arm, Patient Position: Sitting, BP Cuff Size: Adult)   Pulse 79   Temp 36.7 °C (98.1 °F) (Temporal)   Resp 16   Ht 1.575 m (5' 2\")   Wt 65.2 kg (143 lb 11.8 oz)   LMP  (LMP Unknown)   SpO2 95%   BMI 26.29 kg/m²   Gen.: Well-developed, well-nourished, no apparent distress,pleasant and cooperative with the examination  Skin:  Warm and dry with good turgor. No rashes or suspicious lesions in visible areas  HEENT:Sinuses nontender with palpation, TMs clear, nares patent with pink mucosa and clear rhinorrhea,no septal deviation ,polyps or lesions. lips without lesions, oropharynx clear.  Neck: Trachea midline,no masses or adenopathy. No JVD.  Cor: Regular rate and rhythm without murmur, gallop or rub.  Lungs: Respirations unlabored.Clear to auscultation with equal breath sounds bilaterally. No wheezes, rhonchi.  Extremities: No cyanosis, clubbing or edema.  Peripheral pulses on the left lower extremity.            Assessment and Plan.   61 y.o. female     1. Essential hypertension  Controlled.  Continue on losartan 100 mg daily    - CBC WITH DIFFERENTIAL; Future  - Comp Metabolic Panel; Future  - Lipid Profile; Future  - TSH; Future    2. Seizure disorder (HCC)  Stable.  No seizure for 9 years.  Continue on Trileptal 300 mg twice a day  Continue follow-up with neurology    3. History of cerebrovascular accident (CVA) with residual deficit  Mild left sided weakness since his stroke 9 years ago.  Otherwise asymptomatic.  No urinary symptoms  Continue on atorvastatin 40 mg daily    4. Tobacco dependence  Smokes about a pack a day for more than 30 years,  Smoking cessation discussed, patient is " not ready    5. Pain of left lower extremity  Rule out peripheral vascular disease, patient is chronic smoker for more than 30 years    - US-EXTREMITY ARTERY LOWER UNILAT LEFT; Future

## 2022-10-20 DIAGNOSIS — E78.00 PURE HYPERCHOLESTEROLEMIA: ICD-10-CM

## 2022-10-20 RX ORDER — ATORVASTATIN CALCIUM 40 MG/1
TABLET, FILM COATED ORAL
Qty: 90 TABLET | Refills: 0 | Status: SHIPPED | OUTPATIENT
Start: 2022-10-20 | End: 2023-02-01 | Stop reason: SDUPTHER

## 2022-10-31 DIAGNOSIS — I10 PRIMARY HYPERTENSION: ICD-10-CM

## 2022-10-31 RX ORDER — LOSARTAN POTASSIUM 100 MG/1
100 TABLET ORAL DAILY
Qty: 90 TABLET | Refills: 2 | Status: SHIPPED | OUTPATIENT
Start: 2022-10-31 | End: 2023-06-09

## 2022-11-09 ENCOUNTER — PATIENT MESSAGE (OUTPATIENT)
Dept: HEALTH INFORMATION MANAGEMENT | Facility: OTHER | Age: 62
End: 2022-11-09

## 2022-11-28 ENCOUNTER — HOSPITAL ENCOUNTER (OUTPATIENT)
Dept: LAB | Facility: MEDICAL CENTER | Age: 62
End: 2022-11-28
Attending: FAMILY MEDICINE
Payer: MEDICARE

## 2022-11-28 DIAGNOSIS — I10 ESSENTIAL HYPERTENSION: ICD-10-CM

## 2022-11-28 LAB
ALBUMIN SERPL BCP-MCNC: 4.1 G/DL (ref 3.2–4.9)
ALBUMIN/GLOB SERPL: 1.2 G/DL
ALP SERPL-CCNC: 115 U/L (ref 30–99)
ALT SERPL-CCNC: 10 U/L (ref 2–50)
ANION GAP SERPL CALC-SCNC: 12 MMOL/L (ref 7–16)
AST SERPL-CCNC: 11 U/L (ref 12–45)
BASOPHILS # BLD AUTO: 1.1 % (ref 0–1.8)
BASOPHILS # BLD: 0.08 K/UL (ref 0–0.12)
BILIRUB SERPL-MCNC: 0.2 MG/DL (ref 0.1–1.5)
BUN SERPL-MCNC: 12 MG/DL (ref 8–22)
CALCIUM SERPL-MCNC: 9.6 MG/DL (ref 8.5–10.5)
CHLORIDE SERPL-SCNC: 102 MMOL/L (ref 96–112)
CHOLEST SERPL-MCNC: 175 MG/DL (ref 100–199)
CO2 SERPL-SCNC: 22 MMOL/L (ref 20–33)
CREAT SERPL-MCNC: 0.72 MG/DL (ref 0.5–1.4)
EOSINOPHIL # BLD AUTO: 0.16 K/UL (ref 0–0.51)
EOSINOPHIL NFR BLD: 2.2 % (ref 0–6.9)
ERYTHROCYTE [DISTWIDTH] IN BLOOD BY AUTOMATED COUNT: 45.1 FL (ref 35.9–50)
FASTING STATUS PATIENT QL REPORTED: NORMAL
GFR SERPLBLD CREATININE-BSD FMLA CKD-EPI: 94 ML/MIN/1.73 M 2
GLOBULIN SER CALC-MCNC: 3.5 G/DL (ref 1.9–3.5)
GLUCOSE SERPL-MCNC: 90 MG/DL (ref 65–99)
HCT VFR BLD AUTO: 54.7 % (ref 37–47)
HDLC SERPL-MCNC: 47 MG/DL
HGB BLD-MCNC: 17.8 G/DL (ref 12–16)
IMM GRANULOCYTES # BLD AUTO: 0.02 K/UL (ref 0–0.11)
IMM GRANULOCYTES NFR BLD AUTO: 0.3 % (ref 0–0.9)
LDLC SERPL CALC-MCNC: 82 MG/DL
LYMPHOCYTES # BLD AUTO: 1.62 K/UL (ref 1–4.8)
LYMPHOCYTES NFR BLD: 22.2 % (ref 22–41)
MCH RBC QN AUTO: 28.8 PG (ref 27–33)
MCHC RBC AUTO-ENTMCNC: 32.5 G/DL (ref 33.6–35)
MCV RBC AUTO: 88.4 FL (ref 81.4–97.8)
MONOCYTES # BLD AUTO: 0.43 K/UL (ref 0–0.85)
MONOCYTES NFR BLD AUTO: 5.9 % (ref 0–13.4)
NEUTROPHILS # BLD AUTO: 5 K/UL (ref 2–7.15)
NEUTROPHILS NFR BLD: 68.3 % (ref 44–72)
NRBC # BLD AUTO: 0 K/UL
NRBC BLD-RTO: 0 /100 WBC
PLATELET # BLD AUTO: 283 K/UL (ref 164–446)
PMV BLD AUTO: 8.7 FL (ref 9–12.9)
POTASSIUM SERPL-SCNC: 4.3 MMOL/L (ref 3.6–5.5)
PROT SERPL-MCNC: 7.6 G/DL (ref 6–8.2)
RBC # BLD AUTO: 6.19 M/UL (ref 4.2–5.4)
SODIUM SERPL-SCNC: 136 MMOL/L (ref 135–145)
TRIGL SERPL-MCNC: 231 MG/DL (ref 0–149)
TSH SERPL DL<=0.005 MIU/L-ACNC: 1.46 UIU/ML (ref 0.38–5.33)
WBC # BLD AUTO: 7.3 K/UL (ref 4.8–10.8)

## 2022-11-28 PROCEDURE — 84443 ASSAY THYROID STIM HORMONE: CPT

## 2022-11-28 PROCEDURE — 80053 COMPREHEN METABOLIC PANEL: CPT

## 2022-11-28 PROCEDURE — 85025 COMPLETE CBC W/AUTO DIFF WBC: CPT

## 2022-11-28 PROCEDURE — 36415 COLL VENOUS BLD VENIPUNCTURE: CPT

## 2022-11-28 PROCEDURE — 80061 LIPID PANEL: CPT

## 2022-12-06 ENCOUNTER — OFFICE VISIT (OUTPATIENT)
Dept: MEDICAL GROUP | Facility: MEDICAL CENTER | Age: 62
End: 2022-12-06
Payer: MEDICARE

## 2022-12-06 VITALS
OXYGEN SATURATION: 95 % | DIASTOLIC BLOOD PRESSURE: 70 MMHG | HEART RATE: 80 BPM | HEIGHT: 62 IN | BODY MASS INDEX: 26.29 KG/M2 | WEIGHT: 142.86 LBS | SYSTOLIC BLOOD PRESSURE: 124 MMHG | TEMPERATURE: 97.6 F | RESPIRATION RATE: 12 BRPM

## 2022-12-06 DIAGNOSIS — I10 ESSENTIAL HYPERTENSION: ICD-10-CM

## 2022-12-06 DIAGNOSIS — D75.1 ERYTHROCYTOSIS: ICD-10-CM

## 2022-12-06 DIAGNOSIS — E78.00 PURE HYPERCHOLESTEROLEMIA: ICD-10-CM

## 2022-12-06 DIAGNOSIS — Z23 NEED FOR VACCINATION: ICD-10-CM

## 2022-12-06 DIAGNOSIS — J41.0 SMOKERS' COUGH (HCC): ICD-10-CM

## 2022-12-06 PROCEDURE — G0008 ADMIN INFLUENZA VIRUS VAC: HCPCS | Performed by: FAMILY MEDICINE

## 2022-12-06 PROCEDURE — 99214 OFFICE O/P EST MOD 30 MIN: CPT | Mod: 25 | Performed by: FAMILY MEDICINE

## 2022-12-06 PROCEDURE — 90686 IIV4 VACC NO PRSV 0.5 ML IM: CPT | Performed by: FAMILY MEDICINE

## 2022-12-06 ASSESSMENT — FIBROSIS 4 INDEX: FIB4 SCORE: 0.76

## 2022-12-06 NOTE — PROGRESS NOTES
CC: Hypertension, hypercholesterolemia, smoker's cough/secondary polycythemia    HPI:   Rubi presents today to discuss the following:    Essential hypertension  Blood pressure has been adequately controlled on losartan 100 mg daily, and amlodipine 5 mg daily    Pure hypercholesterolemia  She has been tolerating the statin. Denies muscle pain LFTs has been normal, has been on atorvastatin 40 mg daily     Smokers' cough (HCC)/ Erythrocytosis  She smokes about a pack a day for more than 50 years, denies any shortness of breath has been having cough once in a while.  Patient has increased red blood cells which is probably secondary polycythemia due to smoking, however it is mild.  Patient has been asymptomatic.  Does not need blood donation at this time    Due for flu shot,       Patient Active Problem List    Diagnosis Date Noted    Nocturnal hypoxia 11/26/2019    Seizure disorder (HCC) 08/20/2018    Vitamin D deficiency disease 07/05/2016    AVM (arteriovenous malformation) 06/29/2016    S/P partial colectomy 06/29/2016    Left hemiplegia (HCC) 06/29/2016    History of cerebrovascular accident (CVA) with residual deficit 06/29/2016    Smoker 06/29/2016       Current Outpatient Medications   Medication Sig Dispense Refill    losartan (COZAAR) 100 MG Tab Take 1 Tablet by mouth every day. 90 Tablet 2    atorvastatin (LIPITOR) 40 MG Tab TAKE ONE TABLET BY MOUTH EVERY NIGHT AT BEDTIME 90 Tablet 0    amLODIPine (NORVASC) 5 MG Tab Take 1 Tablet by mouth every day. 90 Tablet 3    OXcarbazepine (TRILEPTAL) 300 MG Tab Take 1 Tablet by mouth 2 times a day. 180 Tablet 3    Cholecalciferol (VITAMIN D3 PO) Take  by mouth.       No current facility-administered medications for this visit.         Allergies as of 12/06/2022    (No Known Allergies)        ROS: Denies any chest pain, Shortness of breath, Changes bowel or bladder, Lower extremity edema.    Physical Exam:  /70 (BP Location: Right arm, Patient Position: Sitting,  "BP Cuff Size: Adult)   Pulse 80   Temp 36.4 °C (97.6 °F) (Temporal)   Resp 12   Ht 1.575 m (5' 2\")   Wt 64.8 kg (142 lb 13.7 oz)   LMP  (LMP Unknown)   SpO2 95%   BMI 26.13 kg/m²   Gen.: Well-developed, well-nourished, no apparent distress,pleasant and cooperative with the examination  Skin:  Warm and dry with good turgor. No rashes or suspicious lesions in visible areas  HEENT:Sinuses nontender with palpation, TMs clear, nares patent with pink mucosa and clear rhinorrhea,no septal deviation ,polyps or lesions. lips without lesions, oropharynx clear.  Neck: Trachea midline,no masses or adenopathy. No JVD.  Cor: Regular rate and rhythm without murmur, gallop or rub.  Lungs: Respirations unlabored.Clear to auscultation with equal breath sounds bilaterally. No wheezes, rhonchi.  Extremities: No cyanosis, clubbing or edema.      Assessment and Plan.   62 y.o. female     1. Essential hypertension  Controlled.  Continue on losartan 100 mg daily, and amlodipine 5 mg daily    2. Pure hypercholesterolemia  He has been tolerating the statin. Denies muscle pain LFTs has been normal  Continue on atorvastatin 40 mg daily     3. Smokers' cough (HCC)  Smokes about a pack a day for more than 50 years, denies any shortness of breath has been having cough once in a while.  Smoking cessation discussed, patient will think about it    - PULMONARY FUNCTION TESTS -Test requested: Complete Pulmonary Function Test; Future    4. Need for vaccination  Due for flu shot, given today    - INFLUENZA VACCINE QUAD INJ (PF)    5. Erythrocytosis  Probably secondary polycythemia due to smoking, however it is mild.  Patient has been asymptomatic.  Does not need blood donation at this time  Smoking cessation discussed, patient will think about it        "

## 2023-02-01 DIAGNOSIS — E78.00 PURE HYPERCHOLESTEROLEMIA: ICD-10-CM

## 2023-02-01 RX ORDER — ATORVASTATIN CALCIUM 40 MG/1
TABLET, FILM COATED ORAL
Qty: 90 TABLET | Refills: 0 | Status: SHIPPED | OUTPATIENT
Start: 2023-02-01 | End: 2023-05-12 | Stop reason: SDUPTHER

## 2023-04-10 ENCOUNTER — OFFICE VISIT (OUTPATIENT)
Dept: MEDICAL GROUP | Facility: MEDICAL CENTER | Age: 63
End: 2023-04-10
Payer: MEDICARE

## 2023-04-10 VITALS
WEIGHT: 142 LBS | TEMPERATURE: 97.4 F | HEART RATE: 68 BPM | HEIGHT: 62 IN | OXYGEN SATURATION: 95 % | SYSTOLIC BLOOD PRESSURE: 112 MMHG | BODY MASS INDEX: 26.13 KG/M2 | DIASTOLIC BLOOD PRESSURE: 74 MMHG | RESPIRATION RATE: 16 BRPM

## 2023-04-10 DIAGNOSIS — E78.5 HYPERLIPIDEMIA, UNSPECIFIED HYPERLIPIDEMIA TYPE: ICD-10-CM

## 2023-04-10 DIAGNOSIS — G40.909 SEIZURE DISORDER (HCC): ICD-10-CM

## 2023-04-10 DIAGNOSIS — I10 ESSENTIAL HYPERTENSION: ICD-10-CM

## 2023-04-10 DIAGNOSIS — I69.30 HISTORY OF CEREBROVASCULAR ACCIDENT (CVA) WITH RESIDUAL DEFICIT: ICD-10-CM

## 2023-04-10 PROCEDURE — 99214 OFFICE O/P EST MOD 30 MIN: CPT | Performed by: FAMILY MEDICINE

## 2023-04-10 ASSESSMENT — PATIENT HEALTH QUESTIONNAIRE - PHQ9: CLINICAL INTERPRETATION OF PHQ2 SCORE: 0

## 2023-04-10 ASSESSMENT — FIBROSIS 4 INDEX: FIB4 SCORE: 0.76

## 2023-04-10 NOTE — PROGRESS NOTES
CC: Hypertension, hyperlipidemia, history of stroke seizure disorder    HPI:   Rubi presents today to discuss the following medical issues:     Essential hypertension  Blood pressure has been adequately controlled on losartan 100 mg daily, amlodipine 5 mg daily.  No side effects    Hyperlipidemia, unspecified hyperlipidemia type  She has been tolerating the statin. Denies muscle pain LFTs has been normal, has been on atorvastatin 40 mg daily.  Patient has history of stroke.    History of cerebrovascular accident (CVA) with residual deficit  Patient has history of neurological stroke due to AVM about 10 years ago.  Currently with left-sided weakness.  Currently no change in her condition.  Has been on atorvastatin 40 mg daily    Seizure disorder (HCC)  Patient's last seizure episode was 10 years ago while she was having craniotomy due to arteriovenous malformation in the brain.  She has been on Trileptal 300 mg twice a day.  She has been following up with neurology Dr. Smith.        Patient Active Problem List    Diagnosis Date Noted    Nocturnal hypoxia 11/26/2019    Seizure disorder (HCC) 08/20/2018    Vitamin D deficiency disease 07/05/2016    AVM (arteriovenous malformation) 06/29/2016    S/P partial colectomy 06/29/2016    Left hemiplegia (HCC) 06/29/2016    History of cerebrovascular accident (CVA) with residual deficit 06/29/2016    Smoker 06/29/2016       Current Outpatient Medications   Medication Sig Dispense Refill    atorvastatin (LIPITOR) 40 MG Tab TAKE ONE TABLET BY MOUTH EVERY NIGHT AT BEDTIME 90 Tablet 0    losartan (COZAAR) 100 MG Tab Take 1 Tablet by mouth every day. 90 Tablet 2    amLODIPine (NORVASC) 5 MG Tab Take 1 Tablet by mouth every day. 90 Tablet 3    OXcarbazepine (TRILEPTAL) 300 MG Tab Take 1 Tablet by mouth 2 times a day. 180 Tablet 3    Cholecalciferol (VITAMIN D3 PO) Take  by mouth.       No current facility-administered medications for this visit.         Allergies as of 04/10/2023  "   (No Known Allergies)        ROS: Denies any chest pain, Shortness of breath, Changes bowel or bladder, Lower extremity edema.    Physical Exam:  /74 (BP Location: Right arm, Patient Position: Sitting, BP Cuff Size: Adult)   Pulse 68   Temp 36.3 °C (97.4 °F) (Temporal)   Resp 16   Ht 1.575 m (5' 2\")   Wt 64.4 kg (142 lb)   LMP  (LMP Unknown)   SpO2 95%   BMI 25.97 kg/m²   Gen.: Well-developed, well-nourished, no apparent distress,pleasant and cooperative with the examination  Skin:  Warm and dry with good turgor. No rashes or suspicious lesions in visible areas  HEENT:Sinuses nontender with palpation, TMs clear, nares patent with pink mucosa and clear rhinorrhea,no septal deviation ,polyps or lesions. lips without lesions, oropharynx clear.  Neck: Trachea midline,no masses or adenopathy. No JVD.  Cor: Regular rate and rhythm without murmur, gallop or rub.  Lungs: Respirations unlabored.Clear to auscultation with equal breath sounds bilaterally. No wheezes, rhonchi.  Extremities: No cyanosis, clubbing or edema. Left side weakness(Upper and lower extremities)      Assessment and Plan.   62 y.o. female     1. Essential hypertension  Controlled  Continue on losartan 100 mg daily, amlodipine 5 mg daily    2. Hyperlipidemia, unspecified hyperlipidemia type  He has been tolerating the statin. Denies muscle pain LFTs has been normal  Continue atorvastatin 40 mg daily    3. History of cerebrovascular accident (CVA) with residual deficit  Left side weakness    4. Seizure disorder (HCC)  Stable asymptomatic  Continue on Trileptal 300 mg bid  Continue FU neurology        "

## 2023-04-20 ENCOUNTER — TELEPHONE (OUTPATIENT)
Dept: NEUROLOGY | Facility: MEDICAL CENTER | Age: 63
End: 2023-04-20
Payer: MEDICARE

## 2023-04-20 NOTE — TELEPHONE ENCOUNTER
NEUROLOGY PATIENT PRE-VISIT PLANNING     Patient was NOT contacted to complete PVP.    Patient Appointment is scheduled as: Established Patient     Is visit type and length scheduled correctly? No, per Dr Perez he is ok with seeing this patient.     [facility] Patient is checked in Patient Demographics? Yes    3.   Is referral attached to visit? Yes    4. Were records received from referring provider? Yes    4. Patient was NOT contacted to have someone accompany them to visit.     5. Is this appointment scheduled as a Hospital Follow-Up?  No    6. Does the patient require any pre procedure or post procedure follow up? No    7. If any orders were placed at last visit or intended to be done for this visit do we have Results/Consult Notes? Yes  Labs -  Labs were ordered but not completed  Imaging - Imaging was ordered and completed.   Referrals - No referrals were ordered at last office visit.    8. If patient appointment is for Botox - is order pended for provider? N/A

## 2023-04-24 ENCOUNTER — OFFICE VISIT (OUTPATIENT)
Dept: NEUROLOGY | Facility: MEDICAL CENTER | Age: 63
End: 2023-04-24
Attending: PSYCHIATRY & NEUROLOGY
Payer: MEDICARE

## 2023-04-24 VITALS
HEART RATE: 77 BPM | WEIGHT: 143.3 LBS | TEMPERATURE: 97.6 F | SYSTOLIC BLOOD PRESSURE: 124 MMHG | OXYGEN SATURATION: 94 % | DIASTOLIC BLOOD PRESSURE: 78 MMHG | BODY MASS INDEX: 26.21 KG/M2

## 2023-04-24 DIAGNOSIS — G40.909 SEIZURE DISORDER (HCC): Primary | ICD-10-CM

## 2023-04-24 DIAGNOSIS — Q27.30 AVM (ARTERIOVENOUS MALFORMATION): ICD-10-CM

## 2023-04-24 PROCEDURE — 99214 OFFICE O/P EST MOD 30 MIN: CPT | Performed by: PSYCHIATRY & NEUROLOGY

## 2023-04-24 RX ORDER — OXCARBAZEPINE 300 MG/1
300 TABLET, FILM COATED ORAL 2 TIMES DAILY
Qty: 180 TABLET | Refills: 3 | Status: SHIPPED | OUTPATIENT
Start: 2023-04-24

## 2023-04-24 ASSESSMENT — FIBROSIS 4 INDEX: FIB4 SCORE: 0.76

## 2023-04-24 ASSESSMENT — ENCOUNTER SYMPTOMS
MEMORY LOSS: 0
FALLS: 0
FOCAL WEAKNESS: 1
SENSORY CHANGE: 0
TREMORS: 0

## 2023-04-24 NOTE — PROGRESS NOTES
Subjective     Rubi Urbina is a 62 y.o. female who presents for annual follow-up, with a history of symptomatic seizures status postresection of a right hemispheric AVM.     CANDIDO Venegas has done well over the last year, having no seizure recurrences.  She is compliant with medication, on an occasional basis she might be late taking a dose.  Still she gets it on board.  She tolerates Trileptal 300 mg, twice daily, without issues of edema, headache, or sleepiness.  Her last clinical seizure activity was in 2012.    From November, 2022, CBC was remarkable for H/H of 17.8/54.7, platelets 283 and WBC 7.3 with an unremarkable differential.  Sodium 136, calcium 9.6, G OT 11 and alkaline phosphatase 115.  Albumin and total bilirubin values were normal.    Previous EEG from January 20, 2020, while on Trileptal 300 mg, twice daily, revealed only intermittent left temporal slowing.    The right hemisphere, cortical AVM has been stable, imaging last performed in January, 2022, compared to studies dating back through April, 2009, revealed stable underlying subcortical and subcortical encephalomalacia postoperatively as well as from the stroke she suffered from in 2007.    Medical, surgical and family histories are reviewed, there are no new drug allergies.  Other than Trileptal 3 mg, twice daily, she is also on vitamin D3, Lipitor, Cozaar and Norvasc.  Lower    Review of Systems   Musculoskeletal:  Negative for falls.   Neurological:  Positive for focal weakness. Negative for tremors and sensory change.   Psychiatric/Behavioral:  Negative for memory loss.    All other systems reviewed and are negative.    Objective     /78 (BP Location: Left arm, Patient Position: Sitting, BP Cuff Size: Adult)   Pulse 77   Temp 36.4 °C (97.6 °F) (Temporal)   Wt 65 kg (143 lb 4.8 oz)   LMP  (LMP Unknown)   SpO2 94%   BMI 26.21 kg/m²      Physical Exam    She appears in no acute distress.  Vital signs are stable.  There is  no malar rash.  Dentition is poor though there is no periodontal disease.  There is mild gingival hyperplasia.  The neck is supple, range of motion is full.  Cardiac evaluation is unremarkable.     Neurological Exam    She is fully oriented, there is no aphasia, apraxia, or inattention.    PERRLA/EOMI, visual fields are full to finger counting on confrontation bilaterally.  There is no subjective decrement of pin or temperature on either side of the face, there is no left-sided, lateralized extinction with double simultaneous stimulation.  There is slight dysarthria trigeminal, the tongue and uvula are midline.  Jaw jerk is absent, the left shoulder shrug is slowed when compared to the right.  Head rotation is intact.    Musculoskeletal exam again reveals the spastic left hemiparesis.  There is the dystonic movements with the left upper extremity which were induced passively as she moves the left foot to command.  She has difficulty with shoulder movements, grasp is 4/5.  In the lower extremity movements are even slower and limited though sustained dorsiflexion is 4/5.  Strength and tone are intact on the right side.    Reflexes are brisker on the left.    She walks with a spastic quality, moving the left lower extremity from the hip.  There is a postural and appendicular dystaxia with the left upper and lower extremities out of proportion to the degree of weakness.  Repetitive movements are nearly absent with the left foot, slowed with the left hand proportionate to the degree of weakness and spasticity.    Sensory exam does reveal a mild sensory extinction of the left side with double simultaneous stimulation, especially with rostrocaudal direction.  To direct comparison temperature is intact as is vibration bilaterally.    Assessment & Plan       1. Seizure disorder (HCC)  She is doing well, we will continue Trileptal 300 mg, twice daily.  That we might consider getting her off medication moving forwards, given  the risk of seizure recurrence due to the underlying structural pathology, even though her EEGs have improved while on treatment, she is opting out.  I certainly have no problem with this.  Liver profile and sodium values have always been unremarkable.  She takes a vitamin D supplement.  She understands risks for seizure recurrence and what to do in the circumstances.  She also noticed to try to avoid going to the emergency room for seizure recurrence as long as she has not injured herself and as long as she has not had several in rapid succession.  Family is aware of this.    - OXcarbazepine (TRILEPTAL) 300 MG Tab; Take 1 Tablet by mouth 2 times a day.  Dispense: 180 Tablet; Refill: 3    2. AVM (arteriovenous malformation)  Radiographically stable, and absent any signs and symptoms suggestive of hemorrhage or sudden worsening of seizures which may be consistent of underlying hemorrhage or AVM recurrence, serial imaging is not required.  We will follow-up in 1 year.    Time: 30 minutes in total spent on patient care including pre-charting, record review, discussion with healthcare staff and documentation.  This includes face-to-face time for exam, review, discussion, as well as counseling and coordinating care.

## 2023-05-12 DIAGNOSIS — E78.00 PURE HYPERCHOLESTEROLEMIA: ICD-10-CM

## 2023-05-15 RX ORDER — ATORVASTATIN CALCIUM 40 MG/1
TABLET, FILM COATED ORAL
Qty: 90 TABLET | Refills: 0 | Status: SHIPPED | OUTPATIENT
Start: 2023-05-15 | End: 2023-06-09

## 2023-06-05 SDOH — ECONOMIC STABILITY: TRANSPORTATION INSECURITY
IN THE PAST 12 MONTHS, HAS LACK OF RELIABLE TRANSPORTATION KEPT YOU FROM MEDICAL APPOINTMENTS, MEETINGS, WORK OR FROM GETTING THINGS NEEDED FOR DAILY LIVING?: NO

## 2023-06-05 SDOH — ECONOMIC STABILITY: HOUSING INSECURITY
IN THE LAST 12 MONTHS, WAS THERE A TIME WHEN YOU DID NOT HAVE A STEADY PLACE TO SLEEP OR SLEPT IN A SHELTER (INCLUDING NOW)?: NO

## 2023-06-05 SDOH — ECONOMIC STABILITY: HOUSING INSECURITY
IN THE LAST 12 MONTHS, WAS THERE A TIME WHEN YOU DID NOT HAVE A STEADY PLACE TO SLEEP OR SLEPT IN A SHELTER (INCLUDING NOW)?: PATIENT REFUSED

## 2023-06-05 SDOH — HEALTH STABILITY: PHYSICAL HEALTH: ON AVERAGE, HOW MANY MINUTES DO YOU ENGAGE IN EXERCISE AT THIS LEVEL?: 0 MIN

## 2023-06-05 SDOH — HEALTH STABILITY: MENTAL HEALTH
STRESS IS WHEN SOMEONE FEELS TENSE, NERVOUS, ANXIOUS, OR CAN'T SLEEP AT NIGHT BECAUSE THEIR MIND IS TROUBLED. HOW STRESSED ARE YOU?: ONLY A LITTLE

## 2023-06-05 SDOH — HEALTH STABILITY: PHYSICAL HEALTH: ON AVERAGE, HOW MANY DAYS PER WEEK DO YOU ENGAGE IN MODERATE TO STRENUOUS EXERCISE (LIKE A BRISK WALK)?: 0 DAYS

## 2023-06-05 SDOH — ECONOMIC STABILITY: INCOME INSECURITY: IN THE LAST 12 MONTHS, WAS THERE A TIME WHEN YOU WERE NOT ABLE TO PAY THE MORTGAGE OR RENT ON TIME?: PATIENT REFUSED

## 2023-06-05 SDOH — ECONOMIC STABILITY: INCOME INSECURITY: HOW HARD IS IT FOR YOU TO PAY FOR THE VERY BASICS LIKE FOOD, HOUSING, MEDICAL CARE, AND HEATING?: PATIENT DECLINED

## 2023-06-05 SDOH — ECONOMIC STABILITY: TRANSPORTATION INSECURITY
IN THE PAST 12 MONTHS, HAS THE LACK OF TRANSPORTATION KEPT YOU FROM MEDICAL APPOINTMENTS OR FROM GETTING MEDICATIONS?: PATIENT DECLINED

## 2023-06-05 SDOH — ECONOMIC STABILITY: FOOD INSECURITY: WITHIN THE PAST 12 MONTHS, THE FOOD YOU BOUGHT JUST DIDN'T LAST AND YOU DIDN'T HAVE MONEY TO GET MORE.: PATIENT DECLINED

## 2023-06-05 SDOH — ECONOMIC STABILITY: TRANSPORTATION INSECURITY
IN THE PAST 12 MONTHS, HAS LACK OF TRANSPORTATION KEPT YOU FROM MEETINGS, WORK, OR FROM GETTING THINGS NEEDED FOR DAILY LIVING?: NO

## 2023-06-05 SDOH — ECONOMIC STABILITY: HOUSING INSECURITY

## 2023-06-05 SDOH — ECONOMIC STABILITY: FOOD INSECURITY: WITHIN THE PAST 12 MONTHS, YOU WORRIED THAT YOUR FOOD WOULD RUN OUT BEFORE YOU GOT MONEY TO BUY MORE.: PATIENT DECLINED

## 2023-06-05 ASSESSMENT — SOCIAL DETERMINANTS OF HEALTH (SDOH)
HOW OFTEN DO YOU HAVE A DRINK CONTAINING ALCOHOL: PATIENT DECLINED
HOW OFTEN DO YOU GET TOGETHER WITH FRIENDS OR RELATIVES?: PATIENT DECLINED
WITHIN THE PAST 12 MONTHS, YOU WORRIED THAT YOUR FOOD WOULD RUN OUT BEFORE YOU GOT THE MONEY TO BUY MORE: PATIENT DECLINED
HOW OFTEN DO YOU GET TOGETHER WITH FRIENDS OR RELATIVES?: PATIENT DECLINED
IN A TYPICAL WEEK, HOW MANY TIMES DO YOU TALK ON THE PHONE WITH FAMILY, FRIENDS, OR NEIGHBORS?: MORE THAN THREE TIMES A WEEK
HOW OFTEN DO YOU HAVE SIX OR MORE DRINKS ON ONE OCCASION: NEVER
HOW OFTEN DO YOU ATTEND CHURCH OR RELIGIOUS SERVICES?: NEVER
HOW HARD IS IT FOR YOU TO PAY FOR THE VERY BASICS LIKE FOOD, HOUSING, MEDICAL CARE, AND HEATING?: PATIENT DECLINED
HOW OFTEN DO YOU ATTENT MEETINGS OF THE CLUB OR ORGANIZATION YOU BELONG TO?: NEVER
HOW MANY DRINKS CONTAINING ALCOHOL DO YOU HAVE ON A TYPICAL DAY WHEN YOU ARE DRINKING: PATIENT DOES NOT DRINK
HOW OFTEN DO YOU ATTEND CHURCH OR RELIGIOUS SERVICES?: NEVER
IN A TYPICAL WEEK, HOW MANY TIMES DO YOU TALK ON THE PHONE WITH FAMILY, FRIENDS, OR NEIGHBORS?: MORE THAN THREE TIMES A WEEK
DO YOU BELONG TO ANY CLUBS OR ORGANIZATIONS SUCH AS CHURCH GROUPS UNIONS, FRATERNAL OR ATHLETIC GROUPS, OR SCHOOL GROUPS?: NO
DO YOU BELONG TO ANY CLUBS OR ORGANIZATIONS SUCH AS CHURCH GROUPS UNIONS, FRATERNAL OR ATHLETIC GROUPS, OR SCHOOL GROUPS?: NO
HOW OFTEN DO YOU ATTENT MEETINGS OF THE CLUB OR ORGANIZATION YOU BELONG TO?: NEVER

## 2023-06-05 ASSESSMENT — LIFESTYLE VARIABLES
HOW OFTEN DO YOU HAVE A DRINK CONTAINING ALCOHOL: PATIENT DECLINED
AUDIT-C TOTAL SCORE: -1
SKIP TO QUESTIONS 9-10: 0
HOW MANY STANDARD DRINKS CONTAINING ALCOHOL DO YOU HAVE ON A TYPICAL DAY: PATIENT DOES NOT DRINK
HOW OFTEN DO YOU HAVE SIX OR MORE DRINKS ON ONE OCCASION: NEVER

## 2023-06-06 ENCOUNTER — OFFICE VISIT (OUTPATIENT)
Dept: MEDICAL GROUP | Facility: MEDICAL CENTER | Age: 63
End: 2023-06-06
Payer: MEDICARE

## 2023-06-06 VITALS
SYSTOLIC BLOOD PRESSURE: 114 MMHG | HEART RATE: 76 BPM | WEIGHT: 144.2 LBS | TEMPERATURE: 97.2 F | BODY MASS INDEX: 26.54 KG/M2 | DIASTOLIC BLOOD PRESSURE: 72 MMHG | HEIGHT: 62 IN | OXYGEN SATURATION: 98 %

## 2023-06-06 DIAGNOSIS — J41.0 SIMPLE CHRONIC BRONCHITIS (HCC): ICD-10-CM

## 2023-06-06 DIAGNOSIS — I10 PRIMARY HYPERTENSION: ICD-10-CM

## 2023-06-06 DIAGNOSIS — G81.94 LEFT HEMIPLEGIA (HCC): Chronic | ICD-10-CM

## 2023-06-06 DIAGNOSIS — Z12.31 ENCOUNTER FOR SCREENING MAMMOGRAM FOR MALIGNANT NEOPLASM OF BREAST: ICD-10-CM

## 2023-06-06 DIAGNOSIS — F17.210 CIGARETTE SMOKER: ICD-10-CM

## 2023-06-06 DIAGNOSIS — G40.909 SEIZURE DISORDER (HCC): Chronic | ICD-10-CM

## 2023-06-06 DIAGNOSIS — I72.0 ANEURYSM OF CAROTID ARTERY (HCC): ICD-10-CM

## 2023-06-06 PROCEDURE — 3074F SYST BP LT 130 MM HG: CPT | Performed by: FAMILY MEDICINE

## 2023-06-06 PROCEDURE — 99214 OFFICE O/P EST MOD 30 MIN: CPT | Performed by: FAMILY MEDICINE

## 2023-06-06 PROCEDURE — 3078F DIAST BP <80 MM HG: CPT | Performed by: FAMILY MEDICINE

## 2023-06-06 RX ORDER — ALBUTEROL SULFATE 90 UG/1
2 AEROSOL, METERED RESPIRATORY (INHALATION) EVERY 4 HOURS PRN
Qty: 1 EACH | Refills: 11 | Status: SHIPPED | OUTPATIENT
Start: 2023-06-06

## 2023-06-06 ASSESSMENT — ENCOUNTER SYMPTOMS
WEIGHT LOSS: 0
HEADACHES: 1
DIZZINESS: 0
SHORTNESS OF BREATH: 0
FALLS: 0
DIARRHEA: 0
CONSTIPATION: 0
HEARTBURN: 0

## 2023-06-06 ASSESSMENT — FIBROSIS 4 INDEX: FIB4 SCORE: 0.76

## 2023-06-06 NOTE — PROGRESS NOTES
Subjective:     CC:  Diagnoses of Simple chronic bronchitis (HCC), Cigarette smoker, Seizure disorder (HCC), Left hemiplegia (HCC), Aneurysm of carotid artery (HCC), Encounter for screening mammogram for malignant neoplasm of breast, and Primary hypertension were pertinent to this visit.    HISTORY OF THE PRESENT ILLNESS: Patient is a 62 y.o. female. This pleasant patient is here today to establish care and discuss medication management and chronic disease management.     Hypertension  happy is under control  Does not need refills  Overall feels well    AVM  Last MRA showed good stable  Followed by Dr. Perez, will see annual    Seizures  Last seizure was right after stroke in 2000's  Tolerating Trileptal, followed by Neurology as above    History of stroke during surgery for AVM  Still has difficulty/deficits in left side        Problem   Simple Chronic Bronchitis (Hcc)   Aneurysm of Carotid Artery (Hcc)   Primary Hypertension   Seizure Disorder (Hcc)   Left hemiplegia (HCC)   Cigarette Smoker       Current Outpatient Medications Ordered in Epic   Medication Sig Dispense Refill    albuterol 108 (90 Base) MCG/ACT Aero Soln inhalation aerosol Inhale 2 Puffs every four hours as needed for Shortness of Breath. 1 Each 11    atorvastatin (LIPITOR) 40 MG Tab TAKE ONE TABLET BY MOUTH EVERY NIGHT AT BEDTIME 90 Tablet 0    OXcarbazepine (TRILEPTAL) 300 MG Tab Take 1 Tablet by mouth 2 times a day. 180 Tablet 3    losartan (COZAAR) 100 MG Tab Take 1 Tablet by mouth every day. 90 Tablet 2    amLODIPine (NORVASC) 5 MG Tab Take 1 Tablet by mouth every day. 90 Tablet 3    Cholecalciferol (VITAMIN D3 PO) Take  by mouth.       No current Epic-ordered facility-administered medications on file.       Health Maintenance: referral to lung cancer screening program, order for mammogram, schedule appointment for PAP    ROS:   Review of Systems   Constitutional:  Negative for weight loss.   Respiratory:  Negative for shortness of breath.   "  Cardiovascular:  Negative for chest pain.   Gastrointestinal:  Negative for constipation, diarrhea and heartburn.   Genitourinary:  Positive for urgency. Negative for dysuria.   Musculoskeletal:  Negative for falls.   Neurological:  Positive for headaches. Negative for dizziness.         Objective:     Exam: /72   Pulse 76   Temp 36.2 °C (97.2 °F) (Temporal)   Ht 1.575 m (5' 2\")   Wt 65.4 kg (144 lb 3.2 oz)   SpO2 98%  Body mass index is 26.37 kg/m².    Physical Exam  Vitals reviewed.   Constitutional:       General: She is not in acute distress.     Appearance: Normal appearance.   HENT:      Head: Normocephalic and atraumatic.   Cardiovascular:      Rate and Rhythm: Normal rate and regular rhythm.      Heart sounds: Normal heart sounds.   Pulmonary:      Effort: Pulmonary effort is normal. No respiratory distress.      Breath sounds: Normal breath sounds.   Skin:     General: Skin is warm and dry.   Neurological:      Mental Status: She is alert. Mental status is at baseline.      Motor: Weakness (spacisty in left upper extremity, reduced  strength in left hand 4/5, reduced strength in extension and flexion of left leg 4/5) present.      Gait: Gait abnormal (pronounced limp).   Psychiatric:         Mood and Affect: Mood normal.         Behavior: Behavior normal.         MRA Head 1/11/2022  IMPRESSION:  1.  A large residual frontoparietal vascular malformation with changes secondary to the previous treatment. When compared with the previous MRI dated 11/14/2019, there has been no significant interval change.  2.  Stable an approximately 4 mm sized left internal carotid aneurysm.    Assessment & Plan:   62 y.o. female with the following -    1. Simple chronic bronchitis (HCC)  Patient endorses intermittent wheezing, thinks she has a history of asthma. No PFT available in system but has been ordered. Has used 's inhaler and found it helpful. Will write her one of her own. Discussed that if she " is needing it regularly would like to try a longer acting inhaler.  - albuterol 108 (90 Base) MCG/ACT Aero Soln inhalation aerosol; Inhale 2 Puffs every four hours as needed for Shortness of Breath.  Dispense: 1 Each; Refill: 11    2. Cigarette smoker  Patient has at least a 30 pack year history  No new concerning symptoms  Not interested in quitting  Did discuss effects of smoking across multiple systems  Spent at least 4 minutes on smoking cessation  - REFERRAL TO LUNG CANCER SCREENING PROGRAM    3. Seizure disorder (HCC)  Chronic and Stable, followed by Neurology  No recent seizure activity  Will continue on Trileptal 300 mg BID  Monitor at least annually     4. Left hemiplegia (HCC)  Chronic and stable  Patient does have assistive devices to help her walk but chooses not to  No recent falls  Patient is on Lipitor 40 mg    5. Aneurysm of carotid artery (HCC)  Chronic and stable 4 mm aneurysm of left internal carotid artery  Continue blood pressure and cholesterol control    6. Encounter for screening mammogram for malignant neoplasm of breast  - MA-SCREENING MAMMO BILAT W/TOMOSYNTHESIS W/CAD; Future    7. Primary hypertension  This is a chronic condition, controlled.  Blood pressure is at goal under 140/90 in the office today.  We will continue the current regimen.  - continue amlodipine 5 mg and Losartan 100 mg      Return in about 6 months (around 12/6/2023), or if symptoms worsen or fail to improve, for Annual Medicare, Lab F/U.    Please note that this dictation was created using voice recognition software. I have made every reasonable attempt to correct obvious errors, but I expect that there are errors of grammar and possibly content that I did not discover before finalizing the note.

## 2023-06-09 DIAGNOSIS — I10 PRIMARY HYPERTENSION: ICD-10-CM

## 2023-06-09 DIAGNOSIS — E78.00 PURE HYPERCHOLESTEROLEMIA: ICD-10-CM

## 2023-06-09 RX ORDER — ATORVASTATIN CALCIUM 40 MG/1
TABLET, FILM COATED ORAL
Qty: 90 TABLET | Refills: 0 | Status: SHIPPED | OUTPATIENT
Start: 2023-06-09 | End: 2023-11-15 | Stop reason: SDUPTHER

## 2023-06-09 RX ORDER — LOSARTAN POTASSIUM 100 MG/1
TABLET ORAL
Qty: 90 TABLET | Refills: 2 | Status: SHIPPED | OUTPATIENT
Start: 2023-06-09

## 2023-06-12 ENCOUNTER — TELEPHONE (OUTPATIENT)
Dept: SLEEP MEDICINE | Facility: MEDICAL CENTER | Age: 63
End: 2023-06-12
Payer: MEDICARE

## 2023-06-12 NOTE — TELEPHONE ENCOUNTER
1. Age 50-77 yrs of age? 62 yrs     2. Total Years Smoking cigarettes? 47 yrs    3. 20 pack year hx of smoking, or greater (average packs per day)?  47 pack yrs @ 1 pack per day     4. Current smoker or if quit, has pt quit within last 15 yrs? Current    5. Completed Lung Cancer screen CT with [facility] previously? None    6. Anything noted on previous CT involving lungs? (Nodules, Mass, Etc.)  None    7. Any signs or symptoms of lung cancer? ( New / change to Cough, Wheezing, S.O.B., coughing up blood, unexplained weight loss within last year)?    None    8. Previous history of lung cancer?   None

## 2023-06-12 NOTE — PROGRESS NOTES
Subjective     Rubi Urbina is a 62 y.o. female who presents with Lung Cancer Screening Program Prescreen and Nicotine Dependence            HPI  Patient seen today for initial lung cancer screening visit. Patient referred by his PCP, Dr. Isaias Mccray.     The patient meets eligibility criteria including age, smoking history (20+ pack years), if former smoker, quit in the last 15 years, and absence of signs or symptoms of lung cancer.    - Age - 62  - Smoking history - Patient has smoked for 47 years at an average of 1 ppd = 47 pack year smoking history.  - Current smoking status - currently smokes about 1ppd  - No symptoms of lung cancer and no previous history of lung cancer.  She has hx of childhood asthma with one episode of SOB about 1 month ago that resolved with albuterol.  No recurrence in SOB.    No Known Allergies    Current Outpatient Medications on File Prior to Visit   Medication Sig Dispense Refill    atorvastatin (LIPITOR) 40 MG Tab TAKE ONE TABLET BY MOUTH EVERY NIGHT AT BEDTIME 90 Tablet 0    losartan (COZAAR) 100 MG Tab TAKE ONE TABLET BY MOUTH DAILY 90 Tablet 2    albuterol 108 (90 Base) MCG/ACT Aero Soln inhalation aerosol Inhale 2 Puffs every four hours as needed for Shortness of Breath. 1 Each 11    OXcarbazepine (TRILEPTAL) 300 MG Tab Take 1 Tablet by mouth 2 times a day. 180 Tablet 3    amLODIPine (NORVASC) 5 MG Tab Take 1 Tablet by mouth every day. 90 Tablet 3    Cholecalciferol (VITAMIN D3 PO) Take  by mouth.       No current facility-administered medications on file prior to visit.       Review of Systems   Constitutional:  Negative for chills, fever and weight loss.   Respiratory:  Positive for shortness of breath. Negative for cough, hemoptysis, sputum production and wheezing.    Cardiovascular:  Negative for chest pain and palpitations.              Objective     /76 (BP Location: Right arm, Patient Position: Sitting, BP Cuff Size: Adult)   Pulse 70   Ht 1.575 m (5'  "2\")   Wt 66.2 kg (146 lb)   LMP  (LMP Unknown)   SpO2 95%   BMI 26.70 kg/m²      Physical Exam  Constitutional:       Appearance: Normal appearance.   Cardiovascular:      Rate and Rhythm: Normal rate and regular rhythm.   Pulmonary:      Effort: Pulmonary effort is normal.      Breath sounds: Normal breath sounds.   Musculoskeletal:         General: No swelling.   Neurological:      Mental Status: She is alert.                             Assessment & Plan        1. Cigarette smoker  CT-LUNG CANCER-SCREENING             We conducted a shared decision-making process using a decision aid. We reviewed benefits and harms of screening, including false positives and potential need for additional diagnostic testing, the possibility of over diagnosis, and total radiation exposure.    We discussed the importance of adhering to annual LDCT screening. We also discussed the impact of comorbities on the patient's the ability or willingness to undergo diagnostic procedure(s) and treatment.    Counseling on the importance of maintaining cigarette smoking abstinence if former smoker; or the importance of smoking cessation if current smoker and, if appropriate, furnishing of information about tobacco cessation interventions.    Based on our discussion, we have decided to begin annual lung cancer screening starting now.    No follow-up needed unless imaging is abnormal               "

## 2023-06-19 ENCOUNTER — PATIENT MESSAGE (OUTPATIENT)
Dept: SLEEP MEDICINE | Facility: MEDICAL CENTER | Age: 63
End: 2023-06-19
Payer: MEDICARE

## 2023-06-20 ENCOUNTER — OFFICE VISIT (OUTPATIENT)
Dept: SLEEP MEDICINE | Facility: MEDICAL CENTER | Age: 63
End: 2023-06-20
Attending: FAMILY MEDICINE
Payer: MEDICARE

## 2023-06-20 VITALS
OXYGEN SATURATION: 95 % | WEIGHT: 146 LBS | HEART RATE: 70 BPM | BODY MASS INDEX: 26.87 KG/M2 | SYSTOLIC BLOOD PRESSURE: 126 MMHG | HEIGHT: 62 IN | DIASTOLIC BLOOD PRESSURE: 76 MMHG

## 2023-06-20 DIAGNOSIS — F17.210 CIGARETTE SMOKER: ICD-10-CM

## 2023-06-20 PROCEDURE — G0296 VISIT TO DETERM LDCT ELIG: HCPCS | Performed by: FAMILY MEDICINE

## 2023-06-20 PROCEDURE — 3078F DIAST BP <80 MM HG: CPT | Performed by: FAMILY MEDICINE

## 2023-06-20 PROCEDURE — 3074F SYST BP LT 130 MM HG: CPT | Performed by: FAMILY MEDICINE

## 2023-06-20 ASSESSMENT — ENCOUNTER SYMPTOMS
WHEEZING: 0
CHILLS: 0
SPUTUM PRODUCTION: 0
PALPITATIONS: 0
SHORTNESS OF BREATH: 1
HEMOPTYSIS: 0
COUGH: 0
WEIGHT LOSS: 0
FEVER: 0

## 2023-06-20 ASSESSMENT — FIBROSIS 4 INDEX: FIB4 SCORE: 0.76

## 2023-06-20 NOTE — Clinical Note
Thank you for referring Rubi to the Lung Cancer Screening program.  I enrolled her today. I will update you re: abnormal findings. -Dr. Leanne Santoro

## 2023-07-11 ENCOUNTER — APPOINTMENT (OUTPATIENT)
Dept: RADIOLOGY | Facility: MEDICAL CENTER | Age: 63
End: 2023-07-11
Attending: FAMILY MEDICINE
Payer: MEDICARE

## 2023-07-31 ENCOUNTER — HOSPITAL ENCOUNTER (OUTPATIENT)
Dept: RADIOLOGY | Facility: MEDICAL CENTER | Age: 63
End: 2023-07-31
Attending: FAMILY MEDICINE
Payer: MEDICARE

## 2023-07-31 DIAGNOSIS — F17.210 CIGARETTE SMOKER: ICD-10-CM

## 2023-07-31 PROCEDURE — 71271 CT THORAX LUNG CANCER SCR C-: CPT

## 2023-08-02 ENCOUNTER — TELEPHONE (OUTPATIENT)
Dept: SLEEP MEDICINE | Facility: MEDICAL CENTER | Age: 63
End: 2023-08-02
Payer: MEDICARE

## 2023-08-02 DIAGNOSIS — I70.0 ATHEROSCLEROSIS OF AORTA (HCC): ICD-10-CM

## 2023-08-02 NOTE — TELEPHONE ENCOUNTER
Phoned patient with results of LDCT exam performed 7/31/23.    Notified her that the results showed no concerning lung nodules  Recommend follow up CT in 12 months.    Informed patient of incidental findings of atherosclerosis of aorta, CAD and atelectasis/pneumonitis with recommendation to follow up with PCP.    Patient agrees to all recommendations.    Referring provider Dr. Mccray notified of results and incidental findings via this communication.    Health maintenance updated and patient sent lung cancer screening result letter.        CT-LUNG CANCER-SCREENING    Result Date: 7/31/2023 7/31/2023 10:12 AM HISTORY/REASON FOR EXAM:  Lung Cancer Screening. History of smoking TECHNIQUE/EXAM DESCRIPTION AND NUMBER OF VIEWS: Lung cancer screening without contrast. Low dose noncontrast helical images were obtained of the chest from the lung apices through the costophrenic sulci utilizing thin collimation and intervals with reconstructed images sent to PACS in axial, coronal and sagittal planes. Low dose optimization technique was utilized for this CT exam including automated exposure control and adjustment of the mA and/or kV according to patient size. COMPARISON: None. FINDINGS: Hyperexpanded lungs. Ill-defined opacities posterior bases of both lower lobes consistent with atelectasis/possible pneumonitis. No pulmonary nodule or mass identified. No pleural effusion. Heart is within normal limits in size. Small pericardial fluid collections. Mild calcified plaque aorta. Calcifications within the coronary arteries. No large mediastinal lymph nodes.     1.  Atelectasis/possible pneumonitis within the posterior bases of both lower lobes. No pleural effusions. 2.  No pulmonary mass or nodule identified. 3.  Scattered coronary artery calcifications. Lung RADS: 1 - Negative: No nodules and definitely benign nodules Findings: no lung nodules nodule(s) with specific calcifications: complete, central, popcorn, concentric rings  and fat containing nodules Management: Continue annual screening with LDCT in 12 months

## 2023-08-02 NOTE — LETTER
"   [facility]  [address]  [facility]  [address]  [place]  [address]  P [phone]  F [phone]  [place] [address]  P [phone]                                    F [phone]  Date: August 2, 2023    Rubi Urbina  [address] [place] [address]    Re:  Low-dose chest CT performed on 7/31/23    Medical Record Number: [id number]    Dear Rubi,    We are pleased to let you know that the results of your recent low-dose chest CT (LDCT) examination were negative, or showed no evidence of lung nodule or mass.  The radiologist recommends to continue annual screening with LDCT in 12 months.  In the event that any additional \"incidental\" findings were identified from this exam, we have communicated back to your primary care provider for follow-up.    Here are some other important points you should know:  Your low-dose Chest CT report has been sent to your referring or primary health care provider and is available to participants in [employer].  As a part of our Lung Cancer Screening program we will remind you and your referring health care provider when your next LDCT screening is due.  Although low-dose chest CT is very effective at finding lung cancer early, it cannot find all lung cancers. If you develop any new symptoms such as shortness of breath, chest pain, or coughing up blood, please call your doctor.  Please keep in mind that good health involves quitting smoking (for help, call [facility] Quit Tobacco program at [phone], an annual physical exam, and continued screening with low-dose chest CT.    Thank you for participating in the Lung Cancer Screening program. If you have any questions about this letter or our program, please call our Physician  at [phone]    Sincerely,  Dr. Leanne Santoro  [facility] Lung Cancer Screening Program     "

## 2023-10-04 DIAGNOSIS — I10 PRIMARY HYPERTENSION: ICD-10-CM

## 2023-10-04 RX ORDER — AMLODIPINE BESYLATE 5 MG/1
5 TABLET ORAL DAILY
Qty: 90 TABLET | Refills: 3 | Status: SHIPPED | OUTPATIENT
Start: 2023-10-04

## 2023-10-29 DIAGNOSIS — J41.0 SIMPLE CHRONIC BRONCHITIS (HCC): Chronic | ICD-10-CM

## 2023-10-29 DIAGNOSIS — G40.909 SEIZURE DISORDER (HCC): Chronic | ICD-10-CM

## 2023-10-29 DIAGNOSIS — E55.9 VITAMIN D DEFICIENCY: ICD-10-CM

## 2023-10-29 DIAGNOSIS — I10 PRIMARY HYPERTENSION: ICD-10-CM

## 2023-10-29 DIAGNOSIS — Z13.21 ENCOUNTER FOR VITAMIN DEFICIENCY SCREENING: ICD-10-CM

## 2023-10-29 DIAGNOSIS — I70.0 ATHEROSCLEROSIS OF AORTA (HCC): ICD-10-CM

## 2023-10-29 DIAGNOSIS — Z11.4 ENCOUNTER FOR SCREENING FOR HIV: ICD-10-CM

## 2023-11-07 ENCOUNTER — HOSPITAL ENCOUNTER (OUTPATIENT)
Dept: RADIOLOGY | Facility: MEDICAL CENTER | Age: 63
End: 2023-11-07
Attending: FAMILY MEDICINE
Payer: MEDICARE

## 2023-11-07 DIAGNOSIS — Z12.31 ENCOUNTER FOR SCREENING MAMMOGRAM FOR MALIGNANT NEOPLASM OF BREAST: ICD-10-CM

## 2023-11-07 PROCEDURE — 77063 BREAST TOMOSYNTHESIS BI: CPT

## 2023-11-15 DIAGNOSIS — E78.00 PURE HYPERCHOLESTEROLEMIA: ICD-10-CM

## 2023-11-15 RX ORDER — ATORVASTATIN CALCIUM 40 MG/1
40 TABLET, FILM COATED ORAL
Qty: 90 TABLET | Refills: 0 | Status: SHIPPED | OUTPATIENT
Start: 2023-11-15 | End: 2024-02-27 | Stop reason: SDUPTHER

## 2023-12-18 ENCOUNTER — OFFICE VISIT (OUTPATIENT)
Dept: NEUROLOGY | Facility: MEDICAL CENTER | Age: 63
End: 2023-12-18
Attending: PSYCHIATRY & NEUROLOGY
Payer: MEDICARE

## 2023-12-18 ENCOUNTER — PHARMACY VISIT (OUTPATIENT)
Dept: PHARMACY | Facility: MEDICAL CENTER | Age: 63
End: 2023-12-18
Payer: MEDICARE

## 2023-12-18 VITALS
OXYGEN SATURATION: 98 % | WEIGHT: 147.49 LBS | HEART RATE: 68 BPM | BODY MASS INDEX: 27.14 KG/M2 | HEIGHT: 62 IN | TEMPERATURE: 98.5 F | SYSTOLIC BLOOD PRESSURE: 118 MMHG | DIASTOLIC BLOOD PRESSURE: 78 MMHG

## 2023-12-18 DIAGNOSIS — G40.909 SEIZURE DISORDER (HCC): Primary | Chronic | ICD-10-CM

## 2023-12-18 DIAGNOSIS — G81.94 LEFT HEMIPLEGIA (HCC): Chronic | ICD-10-CM

## 2023-12-18 PROCEDURE — 3078F DIAST BP <80 MM HG: CPT | Performed by: PSYCHIATRY & NEUROLOGY

## 2023-12-18 PROCEDURE — 99213 OFFICE O/P EST LOW 20 MIN: CPT | Performed by: PSYCHIATRY & NEUROLOGY

## 2023-12-18 PROCEDURE — 3074F SYST BP LT 130 MM HG: CPT | Performed by: PSYCHIATRY & NEUROLOGY

## 2023-12-18 PROCEDURE — RXMED WILLOW AMBULATORY MEDICATION CHARGE: Performed by: INTERNAL MEDICINE

## 2023-12-18 PROCEDURE — 99212 OFFICE O/P EST SF 10 MIN: CPT | Performed by: PSYCHIATRY & NEUROLOGY

## 2023-12-18 RX ORDER — COVID-19 VACCINE, MRNA 0.04 MG/.418ML
INJECTION, SUSPENSION INTRAMUSCULAR
Qty: 0.3 ML | Refills: 0 | OUTPATIENT
Start: 2023-12-18

## 2023-12-18 ASSESSMENT — ENCOUNTER SYMPTOMS
LOSS OF CONSCIOUSNESS: 0
TREMORS: 0
FOCAL WEAKNESS: 1
TINGLING: 0
SEIZURES: 0

## 2023-12-18 ASSESSMENT — FIBROSIS 4 INDEX: FIB4 SCORE: 0.77

## 2023-12-18 NOTE — PROGRESS NOTES
"Subjective     Rubi Urbina is a 63 y.o. female who presents for quick follow-up, with a history of symptomatic seizures status post AVM resection and right hemispheric stroke, with residual left spastic hemiparesis with gait difficulties, now for a DMV disability placard renewal.     HPI    Seizures: Rubi has done well, on Trileptal 300 mg, twice daily, she is compliant, she has had no seizure recurrences.  She has never had problems tolerating the drug itself.    Left hemiparesis: This too has been unchanged.  She is certainly capable of driving, but she does need a disabled parking sticker that allows her to get in close to buildings.    Medical, surgical and family histories are reviewed, there are no new drug allergies.  She is on Trileptal 3 mg, twice daily, also Norvasc, Cozaar, Lipitor, vitamin D and her albuterol inhaler as needed.    Review of Systems   Neurological:  Positive for focal weakness. Negative for tingling, tremors, seizures and loss of consciousness.   All other systems reviewed and are negative.    Objective     /78 (BP Location: Right arm, Patient Position: Sitting, BP Cuff Size: Adult)   Pulse 68   Temp 36.9 °C (98.5 °F) (Temporal)   Ht 1.575 m (5' 2\")   Wt 66.9 kg (147 lb 7.8 oz)   LMP  (LMP Unknown)   SpO2 98%   BMI 26.98 kg/m²      Physical Exam    She appears in no acute distress.  Vital signs are stable.  There is no malar rash.  The neck is supple, range of motion is full.  Cardiac evaluation reveals a regular rhythm.     Neurological Exam    She is oriented, there is no aphasia, apraxia, or inattention.    PERRLA/EOMI, visual fields are full to finger counting on confrontation bilaterally.  There is a subtle flattening of the left nasolabial fold only, smile is symmetric, there is no bulbar dysfunction.  Sensory exam is intact to temperature and light touch bilaterally without extinction.  The left shoulder shrug is slowed when compared to the right but full " movement is symmetric.    Mild spastic left hemiparesis is unchanged.  The leg is more involved than the arm in terms of strength loss.  Left upper extremity still has the dystonic movements, there is little movement with the left foot.  Strength and tone are intact on the right side.  There is still the left-sided reflex predominance.    She walks with a spastic quality, this is unchanged.  There is no appendicular dystaxia with the left upper extremity, the movements are slower.  Fine motor control with the left foot is near completely absent given the degree of weakness.  The movements are very slow with the left hand.  Fine motor control is intact with the right hand and foot.    There is no subjective loss of temperature or light touch on the left side when compared to the right, there is still the left-sided lateralized extinction with double simultaneous stimulation in a rostrocaudal direction.    Assessment & Plan     1. Seizure disorder (HCC)  Stable, we will continue Trileptal 300 mg, twice daily.    2. Left hemiplegia (HCC)  This is the reason for the permanent disability, the disability placard was renewed, giving her two.  I am not quite sure this needs to be renewed every 10 years since it is permanent.  She and I will see each other annually.    Time: 20 minutes in total spent on patient care including pre-charting, record review, discussion with healthcare staff and documentation.  This includes face-to-face time for exam, review, discussion, as well as counseling and coordinating care.

## 2024-02-27 DIAGNOSIS — E78.00 PURE HYPERCHOLESTEROLEMIA: ICD-10-CM

## 2024-02-27 RX ORDER — ATORVASTATIN CALCIUM 40 MG/1
40 TABLET, FILM COATED ORAL
Qty: 90 TABLET | Refills: 3 | Status: SHIPPED | OUTPATIENT
Start: 2024-02-27

## 2024-02-27 NOTE — TELEPHONE ENCOUNTER
Patient comment: I made an appt for april 16th does rx get refilled now or do I just not take it while waiting. Also the lab work request says do after November of 2024. Is that correct?

## 2024-04-16 ENCOUNTER — APPOINTMENT (OUTPATIENT)
Dept: MEDICAL GROUP | Facility: MEDICAL CENTER | Age: 64
End: 2024-04-16
Payer: MEDICARE

## 2024-04-24 DIAGNOSIS — I10 PRIMARY HYPERTENSION: ICD-10-CM

## 2024-04-24 RX ORDER — LOSARTAN POTASSIUM 100 MG/1
100 TABLET ORAL DAILY
Qty: 90 TABLET | Refills: 3 | Status: SHIPPED | OUTPATIENT
Start: 2024-04-24

## 2024-04-29 ENCOUNTER — HOSPITAL ENCOUNTER (OUTPATIENT)
Dept: LAB | Facility: MEDICAL CENTER | Age: 64
End: 2024-04-29
Attending: FAMILY MEDICINE
Payer: MEDICARE

## 2024-04-29 DIAGNOSIS — I10 PRIMARY HYPERTENSION: ICD-10-CM

## 2024-04-29 DIAGNOSIS — G40.909 SEIZURE DISORDER (HCC): Chronic | ICD-10-CM

## 2024-04-29 DIAGNOSIS — I70.0 ATHEROSCLEROSIS OF AORTA (HCC): ICD-10-CM

## 2024-04-29 DIAGNOSIS — J41.0 SIMPLE CHRONIC BRONCHITIS (HCC): Chronic | ICD-10-CM

## 2024-04-29 DIAGNOSIS — Z11.4 ENCOUNTER FOR SCREENING FOR HIV: ICD-10-CM

## 2024-04-29 DIAGNOSIS — Z13.21 ENCOUNTER FOR VITAMIN DEFICIENCY SCREENING: ICD-10-CM

## 2024-04-29 DIAGNOSIS — E55.9 VITAMIN D DEFICIENCY: ICD-10-CM

## 2024-04-29 LAB
25(OH)D3 SERPL-MCNC: 64 NG/ML (ref 30–100)
ALBUMIN SERPL BCP-MCNC: 4.4 G/DL (ref 3.2–4.9)
ALBUMIN/GLOB SERPL: 1.3 G/DL
ALP SERPL-CCNC: 120 U/L (ref 30–99)
ALT SERPL-CCNC: 11 U/L (ref 2–50)
ANION GAP SERPL CALC-SCNC: 11 MMOL/L (ref 7–16)
AST SERPL-CCNC: 19 U/L (ref 12–45)
BILIRUB SERPL-MCNC: 0.2 MG/DL (ref 0.1–1.5)
BUN SERPL-MCNC: 12 MG/DL (ref 8–22)
CALCIUM ALBUM COR SERPL-MCNC: 9.6 MG/DL (ref 8.5–10.5)
CALCIUM SERPL-MCNC: 9.9 MG/DL (ref 8.5–10.5)
CHLORIDE SERPL-SCNC: 105 MMOL/L (ref 96–112)
CHOLEST SERPL-MCNC: 135 MG/DL (ref 100–199)
CO2 SERPL-SCNC: 24 MMOL/L (ref 20–33)
CREAT SERPL-MCNC: 0.83 MG/DL (ref 0.5–1.4)
CREAT UR-MCNC: 60.31 MG/DL
ERYTHROCYTE [DISTWIDTH] IN BLOOD BY AUTOMATED COUNT: 45.5 FL (ref 35.9–50)
GFR SERPLBLD CREATININE-BSD FMLA CKD-EPI: 79 ML/MIN/1.73 M 2
GLOBULIN SER CALC-MCNC: 3.3 G/DL (ref 1.9–3.5)
GLUCOSE SERPL-MCNC: 93 MG/DL (ref 65–99)
HCT VFR BLD AUTO: 55.9 % (ref 37–47)
HDLC SERPL-MCNC: 51 MG/DL
HGB BLD-MCNC: 18.1 G/DL (ref 12–16)
HIV 1+2 AB+HIV1 P24 AG SERPL QL IA: NORMAL
LDLC SERPL CALC-MCNC: 52 MG/DL
MCH RBC QN AUTO: 28.4 PG (ref 27–33)
MCHC RBC AUTO-ENTMCNC: 32.4 G/DL (ref 32.2–35.5)
MCV RBC AUTO: 87.6 FL (ref 81.4–97.8)
MICROALBUMIN UR-MCNC: <1.2 MG/DL
MICROALBUMIN/CREAT UR: NORMAL MG/G (ref 0–30)
PLATELET # BLD AUTO: 310 K/UL (ref 164–446)
PMV BLD AUTO: 9.2 FL (ref 9–12.9)
POTASSIUM SERPL-SCNC: 6.4 MMOL/L (ref 3.6–5.5)
PROT SERPL-MCNC: 7.7 G/DL (ref 6–8.2)
RBC # BLD AUTO: 6.38 M/UL (ref 4.2–5.4)
SODIUM SERPL-SCNC: 140 MMOL/L (ref 135–145)
TRIGL SERPL-MCNC: 161 MG/DL (ref 0–149)
TSH SERPL DL<=0.005 MIU/L-ACNC: 1.32 UIU/ML (ref 0.38–5.33)
VIT B12 SERPL-MCNC: 356 PG/ML (ref 211–911)
WBC # BLD AUTO: 7.9 K/UL (ref 4.8–10.8)

## 2024-04-29 PROCEDURE — 82607 VITAMIN B-12: CPT

## 2024-04-29 PROCEDURE — 82306 VITAMIN D 25 HYDROXY: CPT

## 2024-04-29 PROCEDURE — 36415 COLL VENOUS BLD VENIPUNCTURE: CPT

## 2024-04-29 PROCEDURE — 82043 UR ALBUMIN QUANTITATIVE: CPT

## 2024-04-29 PROCEDURE — 82570 ASSAY OF URINE CREATININE: CPT

## 2024-04-29 PROCEDURE — 80061 LIPID PANEL: CPT

## 2024-04-29 PROCEDURE — 84443 ASSAY THYROID STIM HORMONE: CPT

## 2024-04-29 PROCEDURE — G0475 HIV COMBINATION ASSAY: HCPCS | Mod: GA

## 2024-04-29 PROCEDURE — 80053 COMPREHEN METABOLIC PANEL: CPT

## 2024-04-29 PROCEDURE — 85027 COMPLETE CBC AUTOMATED: CPT

## 2024-04-30 DIAGNOSIS — E87.5 HYPERKALEMIA: ICD-10-CM

## 2024-05-07 ENCOUNTER — APPOINTMENT (OUTPATIENT)
Dept: MEDICAL GROUP | Facility: MEDICAL CENTER | Age: 64
End: 2024-05-07
Payer: MEDICARE

## 2024-06-11 DIAGNOSIS — G40.909 SEIZURE DISORDER (HCC): ICD-10-CM

## 2024-06-11 RX ORDER — OXCARBAZEPINE 300 MG/1
300 TABLET, FILM COATED ORAL 2 TIMES DAILY
Qty: 180 TABLET | Refills: 3 | Status: SHIPPED | OUTPATIENT
Start: 2024-06-11 | End: 2024-06-19 | Stop reason: SDUPTHER

## 2024-06-11 NOTE — TELEPHONE ENCOUNTER
Received request via: Patient    Medication Name/Dosage Oxcarbazepine     When was medication last prescribed 4/24/2023    How many refills were previously provided 3    How many Refills does he patient have left from last prescription 0    Was the patient seen in the last year in this department? Yes   Date of last office visit 12/18/23     Per last Neurology Office Visit, when was the date of next follow up visit set for?                          1 yr   Date of office visit follow up request 12/18/24     Does the patient have an upcoming appointment? Yes   If yes, when 9/23/24             If no, schedule appointment     Does the patient have [facility] and need 100 day supply (blood pressure, diabetes and cholesterol meds only)? Medication is not for cholesterol, blood pressure or diabetes

## 2024-06-13 ENCOUNTER — TELEPHONE (OUTPATIENT)
Dept: NEUROLOGY | Facility: MEDICAL CENTER | Age: 64
End: 2024-06-13
Payer: MEDICARE

## 2024-06-13 DIAGNOSIS — G40.909 SEIZURE DISORDER (HCC): ICD-10-CM

## 2024-06-13 RX ORDER — OXCARBAZEPINE 300 MG/1
300 TABLET, FILM COATED ORAL 2 TIMES DAILY
Qty: 180 TABLET | Refills: 3 | Status: CANCELLED | OUTPATIENT
Start: 2024-06-13

## 2024-06-17 ENCOUNTER — APPOINTMENT (OUTPATIENT)
Dept: MEDICAL GROUP | Facility: MEDICAL CENTER | Age: 64
End: 2024-06-17
Payer: MEDICARE

## 2024-06-17 VITALS
HEIGHT: 62 IN | WEIGHT: 146 LBS | HEART RATE: 87 BPM | DIASTOLIC BLOOD PRESSURE: 66 MMHG | BODY MASS INDEX: 26.87 KG/M2 | TEMPERATURE: 97.9 F | OXYGEN SATURATION: 96 % | SYSTOLIC BLOOD PRESSURE: 134 MMHG

## 2024-06-17 DIAGNOSIS — Q27.30 AVM (ARTERIOVENOUS MALFORMATION): ICD-10-CM

## 2024-06-17 DIAGNOSIS — E55.9 VITAMIN D DEFICIENCY: ICD-10-CM

## 2024-06-17 DIAGNOSIS — Z12.31 ENCOUNTER FOR SCREENING MAMMOGRAM FOR MALIGNANT NEOPLASM OF BREAST: ICD-10-CM

## 2024-06-17 DIAGNOSIS — Z12.11 COLON CANCER SCREENING: ICD-10-CM

## 2024-06-17 DIAGNOSIS — E87.5 HYPERKALEMIA: ICD-10-CM

## 2024-06-17 DIAGNOSIS — I10 PRIMARY HYPERTENSION: ICD-10-CM

## 2024-06-17 DIAGNOSIS — F17.210 CIGARETTE SMOKER: ICD-10-CM

## 2024-06-17 DIAGNOSIS — J41.0 SIMPLE CHRONIC BRONCHITIS (HCC): Chronic | ICD-10-CM

## 2024-06-17 DIAGNOSIS — G40.909 SEIZURE DISORDER (HCC): Chronic | ICD-10-CM

## 2024-06-17 DIAGNOSIS — F43.21 GRIEF: ICD-10-CM

## 2024-06-17 DIAGNOSIS — I72.0 ANEURYSM OF CAROTID ARTERY (HCC): Chronic | ICD-10-CM

## 2024-06-17 DIAGNOSIS — I70.0 ATHEROSCLEROSIS OF AORTA (HCC): ICD-10-CM

## 2024-06-17 DIAGNOSIS — G81.94 LEFT HEMIPLEGIA (HCC): Chronic | ICD-10-CM

## 2024-06-17 PROCEDURE — 99406 BEHAV CHNG SMOKING 3-10 MIN: CPT | Performed by: FAMILY MEDICINE

## 2024-06-17 PROCEDURE — 99214 OFFICE O/P EST MOD 30 MIN: CPT | Mod: 25 | Performed by: FAMILY MEDICINE

## 2024-06-17 PROCEDURE — 3078F DIAST BP <80 MM HG: CPT | Performed by: FAMILY MEDICINE

## 2024-06-17 PROCEDURE — 3075F SYST BP GE 130 - 139MM HG: CPT | Performed by: FAMILY MEDICINE

## 2024-06-17 RX ORDER — AMLODIPINE BESYLATE 5 MG/1
5 TABLET ORAL DAILY
Qty: 90 TABLET | Refills: 3 | Status: SHIPPED | OUTPATIENT
Start: 2024-06-17

## 2024-06-17 RX ORDER — LOSARTAN POTASSIUM 100 MG/1
100 TABLET ORAL DAILY
Qty: 90 TABLET | Refills: 3 | Status: SHIPPED | OUTPATIENT
Start: 2024-06-17

## 2024-06-17 ASSESSMENT — FIBROSIS 4 INDEX: FIB4 SCORE: 1.16

## 2024-06-17 ASSESSMENT — PATIENT HEALTH QUESTIONNAIRE - PHQ9: CLINICAL INTERPRETATION OF PHQ2 SCORE: 0

## 2024-06-17 NOTE — PROGRESS NOTES
"Verbal consent was acquired by the patient to use [employer] ambient listening note generation during this visit    Subjective:     CC: \"Medication management\"    History of Present Illness  The patient is a 63-year-old who presents for evaluation of multiple medical concerns.    The patient reports experiencing high-level stress, attributing it to the recent loss of her 19-year-old grandson. Despite the loss of her grandson, she maintains a generally good health status. She has discontinued the use of her albuterol inhaler, having only used it twice in the past year. Her respiratory function remains stable, although she currently experiences a cough. She has been smoking approximately 2 packs of cigarettes daily for the past few days, although she typically smokes 1 pack of cigarettes daily. She expresses a desire to cease smoking once her grandson's passing. Her last lung cancer screening was conducted in 07/2023. Approximately a week ago, she experienced a runny nose and stomach pain, which she self-medicated with Coricidin. She continues to experience a morning cough, which she suspects may be related to her diaphragm.    The patient continues her regimen of amlodipine and losartan, which she tolerates well.    The patient is currently on atorvastatin, which she reports as effective.    The patient is under the care of Dr. Meza, a neurologist, and is currently on Trileptal. She reports no recent seizures. She experiences difficulty ambulating and reports a sensation of heaviness in her leg when she lifts it to move it. She denies any foot pain upon standing. She is scheduled to see Dr. Meza in 09/2024.   She has received 6 COVID-19 vaccines.          Objective:     Exam:  /66   Pulse 87   Temp 36.6 °C (97.9 °F) (Temporal)   Ht 1.575 m (5' 2\")   Wt 66.2 kg (146 lb)   LMP  (LMP Unknown)   SpO2 96%   BMI 26.70 kg/m²  Body mass index is 26.7 kg/m².    Physical Exam      Results  Laboratory " Studies  Potassium was 6.4. TSH was 1.3. Vitamin D was 64. Vitamin B12 was 356. Total cholesterol was 135, LDL was 52, HDL was 51, triglycerides were 161. Fasting sugar was 93. Creatinine was 0.83 and GFR was 79. Liver enzymes, AST and ALT were 19 and 11. ALK phos was 120.      Assessment & Plan:       1. Hyperkalemia    2. Grief    3. Cigarette smoker  Patient with significant smoking history, she is not yet ready to quit though we spent at least 4 minutes discussing its impact on her health and a variety of ways and encouraged her to consider quitting.  - CT-LUNG CANCER-SCREENING; Future    4. Simple chronic bronchitis (HCC)  - PULMONARY FUNCTION TESTS -Test requested: Complete Pulmonary Function Test; Future    5. Primary hypertension  - amLODIPine (NORVASC) 5 MG Tab; Take 1 Tablet by mouth every day.  Dispense: 90 Tablet; Refill: 3  - losartan (COZAAR) 100 MG Tab; Take 1 Tablet by mouth every day.  Dispense: 90 Tablet; Refill: 3    6. Atherosclerosis of aorta (HCC)    7. Seizure disorder (HCC)    8. Left hemiplegia (HCC)    9. Aneurysm of carotid artery (HCC)    10. AVM (arteriovenous malformation)    11. Vitamin D deficiency disease    12. Encounter for screening mammogram for malignant neoplasm of breast  - MA-SCREENING MAMMO BILAT W/TOMOSYNTHESIS W/CAD; Future    13. Colon cancer screening  - ZAID (FIT DNA)      Assessment & Plan  63-year-old female who has not been in for about a year.  Blood pressure is in a good range we will continue current medication of amlodipine 5 mg losartan 100 mg.  Discussed with her that her potassium had very little to do with her drinking creamer on the morning of her exam and that if requested to do a repeat lab if she is not sure why instead of skipping it to follow through.  She seems to be feeling fine with no concerning change and her pulse but I still would like to get a follow-up lab to check on this.  She is on [place] gently had significant grief with the loss of  her grandson but reports feeling well and declines any assistance regarding medication or referral to counseling.  Patient is a cigarette smoker of many decades and during the stressful time was actually increased to 2 packs a day.  She is open to doing a breathing test and continue albuterol but prefers not to have any other further medication added.  She is not interested in quitting smoking at this time.    Patient is followed by neurology.  She gets regular imaging of her head to monitor her AVM.  She has had seizures in the past and is on Trileptal without any recurrent seizures reported.  She still has left hemiplegia from past stroke.  She continues her atorvastatin 40 mg and understands that smoking is not helping this.    A pulmonary function test has been ordered.  The patient's blood pressure is well-regulated at 134/66. Her potassium level was slightly elevated at 6.4, but her kidney function and electrolytes were within normal limits. Her TSH was 1.3 on 04/29/2023. There was no concerning protein in her urine, which is a positive sign for kidney health. Her vitamin D level was 64, and her vitamin B12 level was 356. Her total cholesterol was at goal at 135, LDL was 52, HDL was 51, and triglycerides were 161. Her fasting sugar was 93. Her creatinine was 0.83 and GFR was 79. Her liver enzymes, AST and ALT, were 19 and 11. Her ALK phos was 120, up from 115 last year. A mammogram has been ordered. Her amlodipine and losartan prescriptions have been refilled. Her potassium levels will be rechecked. A Cologuard kit has been provided. Her last Pap smear was in 2018, and she is due for a repeat. An annual CT scan has been ordered after 07/31/2024.         Return if symptoms worsen or fail to improve, for Annual Medicare, Well Woman/Pap.      This note was created using voice recognition software (Dragon). The accuracy of the dictation is limited by the abilities of the software. I have reviewed the note prior to  signing, however some errors in grammar and context are still possible. If you have any questions related to this note please do not hesitate to contact our office.

## 2024-06-19 DIAGNOSIS — G40.909 SEIZURE DISORDER (HCC): ICD-10-CM

## 2024-06-21 RX ORDER — OXCARBAZEPINE 300 MG/1
300 TABLET, FILM COATED ORAL 2 TIMES DAILY
Qty: 180 TABLET | Refills: 3 | Status: SHIPPED | OUTPATIENT
Start: 2024-06-21

## 2024-09-23 ENCOUNTER — OFFICE VISIT (OUTPATIENT)
Dept: NEUROLOGY | Facility: MEDICAL CENTER | Age: 64
End: 2024-09-23
Attending: PSYCHIATRY & NEUROLOGY
Payer: MEDICARE

## 2024-09-23 VITALS
BODY MASS INDEX: 26.94 KG/M2 | OXYGEN SATURATION: 96 % | HEIGHT: 62 IN | DIASTOLIC BLOOD PRESSURE: 60 MMHG | WEIGHT: 146.39 LBS | TEMPERATURE: 97.6 F | HEART RATE: 72 BPM | SYSTOLIC BLOOD PRESSURE: 130 MMHG

## 2024-09-23 DIAGNOSIS — I69.30 HISTORY OF CEREBROVASCULAR ACCIDENT (CVA) WITH RESIDUAL DEFICIT: ICD-10-CM

## 2024-09-23 DIAGNOSIS — G40.909 SEIZURE DISORDER (HCC): Chronic | ICD-10-CM

## 2024-09-23 PROCEDURE — 3075F SYST BP GE 130 - 139MM HG: CPT | Performed by: PSYCHIATRY & NEUROLOGY

## 2024-09-23 PROCEDURE — 99214 OFFICE O/P EST MOD 30 MIN: CPT | Performed by: PSYCHIATRY & NEUROLOGY

## 2024-09-23 PROCEDURE — 99212 OFFICE O/P EST SF 10 MIN: CPT | Performed by: PSYCHIATRY & NEUROLOGY

## 2024-09-23 PROCEDURE — 3078F DIAST BP <80 MM HG: CPT | Performed by: PSYCHIATRY & NEUROLOGY

## 2024-09-23 RX ORDER — OXCARBAZEPINE 300 MG/1
300 TABLET, FILM COATED ORAL 2 TIMES DAILY
Qty: 180 TABLET | Refills: 3 | Status: SHIPPED | OUTPATIENT
Start: 2024-09-23

## 2024-09-23 ASSESSMENT — PATIENT HEALTH QUESTIONNAIRE - PHQ9: CLINICAL INTERPRETATION OF PHQ2 SCORE: 0

## 2024-09-23 ASSESSMENT — ENCOUNTER SYMPTOMS
HEADACHES: 0
DIZZINESS: 1
LOSS OF CONSCIOUSNESS: 0
MEMORY LOSS: 0
FOCAL WEAKNESS: 1

## 2024-09-23 ASSESSMENT — FIBROSIS 4 INDEX: FIB4 SCORE: 1.16

## 2024-09-23 NOTE — ASSESSMENT & PLAN NOTE
Stable, she is doing very well given the deficits that she has.  [employer] disabled parking sticker has been supplied lifelong.    We will follow-up in 1 year.    Time: 30 minutes in total spent on patient care including creatinine charting, record review, discussion with healthcare staff and documentation.  This includes face-to-face time for exam, review, discussion, as well as counseling and coordinating care.

## 2024-09-23 NOTE — PROGRESS NOTES
"Subjective     Rubi Urbina is a 63 y.o. female who presents with her  Mitchell, for follow-up, with history of symptomatic seizures related to an old right hemispheric stroke status post AVM replacement surgery, with a spastic left hemiparesis.     HPI    Seizures: Rubi has had no seizures over the last year.  She was seen back in December, 2023, on a more urgent basis for DMV parking sticker.  She has been given to permanent disability parking stickers.  She tolerates Trileptal 300 mg, twice daily, without issue.  She is compliant.  She denies tremor, rash, drowsiness, nausea or vomiting, or headache.    Left hemiparesis: Also unchanged.  She denies new or worsening motor deficits, incoordination, visual changes or sensory loss.  Mitchell notes that if he is walking with her to close by on the left, she does become much more unsteady.    From April, 2024, sodium 140, alkaline phosphatase 120, but all other LFT and renal profile values are normal.  CBC still showed elevated H/H, now of 18.1/55.9    Medical, surgical and family histories are reviewed, there are no new drug allergies.  She is on Norvasc 5 mg daily, Cozaar 100 mg daily, Lipitor 40 mg nightly, Trileptal 300 mg twice daily, vitamin D and an albuterol inhaler as needed.    Review of Systems   Neurological:  Positive for dizziness and focal weakness. Negative for loss of consciousness and headaches.   Psychiatric/Behavioral:  Negative for memory loss.    All other systems reviewed and are negative.    Objective     /60 (BP Location: Left arm, Patient Position: Sitting, BP Cuff Size: Adult)   Pulse 72   Temp 36.4 °C (97.6 °F) (Temporal)   Ht 1.575 m (5' 2\")   Wt 66.4 kg (146 lb 6.2 oz)   LMP  (LMP Unknown)   SpO2 96%   BMI 26.77 kg/m²      Physical Exam    She appears in no acute distress.  Vital signs are stable.  There is no malar rash.  The neck is supple.  Cardiac evaluation is unremarkable.     Neurological Exam    She is " oriented, there is no aphasia, apraxia, or inattention.    PERRLA/EOMI, visual fields are full to movement detection on confrontation bilaterally.  Left facial droop is unchanged, there is no sensory loss to temperature or light touch on the left side when compared to the right.  There is slight dysarthria, the tongue and uvula are midline.  Lateral tongue movements are slowed.  Shoulder shrug is slowed on the left when compared to the right.  Head rotation is intact.    Musculoskeletal exam reveals the spastic left hemiparesis.  Left upper extremity actually has strength throughout at about 4/5, left lower extremity movement at the knee with flexion and extension at 4/5, little plantarflexion or dorsiflexion with the left foot, IP at 3/5.  Strength and tone are intact with the right upper and lower extremity.  There is involuntary dystonic type movements with the left hand, made worse with volitional movement.    There is no appendicular dystaxia with the left upper extremity though the movements are shaky throughout a full range of motion.  This could not be assessed in the lower extremity on the left due to the degree of weakness.  Repetitive movements are very slow with the left hand.  Fine motor control and coordination with the right upper and lower extremity are intact.  She walks she has the typical spastic quality which is unchanged.  She hangs onto her 's arm when she is leaving the office, she requires the use of the examiner's hand otherwise.    Sensory exam reveals no subjective decrement to light touch and temperature on the left side, there is lateralized extinction of the left but only with a rostral caudal enhancement.    Assessment & Plan     Assessment & Plan  Seizure disorder (HCC)  Stable, we continue Trileptal unchanged.  Sodium levels have been stable.  CBC count as well.  There is no reason to rock the boat.  She and Bill are clearly aware of circumstance that lowers thresholds.  They  can contact the office in this circumstance.    Orders:    OXcarbazepine (TRILEPTAL) 300 MG Tab; Take 1 Tablet by mouth 2 times a day.    History of cerebrovascular accident (CVA) with residual deficit  Stable, she is doing very well given the deficits that she has.  [employer] disabled parking sticker has been supplied lifelong.    We will follow-up in 1 year.    Time: 30 minutes in total spent on patient care including creatinine charting, record review, discussion with healthcare staff and documentation.  This includes face-to-face time for exam, review, discussion, as well as counseling and coordinating care.

## 2024-09-23 NOTE — ASSESSMENT & PLAN NOTE
Stable, we continue Trileptal unchanged.  Sodium levels have been stable.  CBC count as well.  There is no reason to rock the boat.  She and Bill are clearly aware of circumstance that lowers thresholds.  They can contact the office in this circumstance.    Orders:    OXcarbazepine (TRILEPTAL) 300 MG Tab; Take 1 Tablet by mouth 2 times a day.

## 2025-03-12 DIAGNOSIS — E78.00 PURE HYPERCHOLESTEROLEMIA: ICD-10-CM

## 2025-03-12 NOTE — TELEPHONE ENCOUNTER
Received request via: Patient    Was the patient seen in the last year in this department? Yes    Does the patient have an active prescription (recently filled or refills available) for medication(s) requested? No    Pharmacy Name:[facility]    Does the [facility] have [facility] and need 100-day supply? (This applies to ALL medications) Patient does not have [facility]

## 2025-03-13 RX ORDER — ATORVASTATIN CALCIUM 40 MG/1
40 TABLET, FILM COATED ORAL
Qty: 90 TABLET | Refills: 3 | Status: SHIPPED | OUTPATIENT
Start: 2025-03-13

## 2025-06-17 ENCOUNTER — APPOINTMENT (OUTPATIENT)
Dept: RADIOLOGY | Facility: MEDICAL CENTER | Age: 65
DRG: 279 | End: 2025-06-17
Attending: EMERGENCY MEDICINE
Payer: MEDICARE

## 2025-06-17 ENCOUNTER — HOSPITAL ENCOUNTER (INPATIENT)
Facility: MEDICAL CENTER | Age: 65
LOS: 3 days | DRG: 279 | End: 2025-06-20
Attending: EMERGENCY MEDICINE | Admitting: INTERNAL MEDICINE
Payer: MEDICARE

## 2025-06-17 DIAGNOSIS — G89.18 POSTOPERATIVE PAIN: ICD-10-CM

## 2025-06-17 DIAGNOSIS — I99.8 ACUTE LOWER LIMB ISCHEMIA: Primary | ICD-10-CM

## 2025-06-17 PROBLEM — I73.9 PERIPHERAL ARTERIAL DISEASE (HCC): Status: ACTIVE | Noted: 2025-06-17

## 2025-06-17 LAB
ALBUMIN SERPL BCP-MCNC: 4.6 G/DL (ref 3.2–4.9)
ALBUMIN/GLOB SERPL: 1.2 G/DL
ALP SERPL-CCNC: 116 U/L (ref 30–99)
ALT SERPL-CCNC: 16 U/L (ref 2–50)
ANION GAP SERPL CALC-SCNC: 13 MMOL/L (ref 7–16)
APTT PPP: 24.9 SEC (ref 24.7–36)
APTT PPP: 27.4 SEC (ref 24.7–36)
AST SERPL-CCNC: 23 U/L (ref 12–45)
BASOPHILS # BLD AUTO: 1.2 % (ref 0–1.8)
BASOPHILS # BLD: 0.09 K/UL (ref 0–0.12)
BILIRUB SERPL-MCNC: 0.4 MG/DL (ref 0.1–1.5)
BUN SERPL-MCNC: 13 MG/DL (ref 8–22)
CALCIUM ALBUM COR SERPL-MCNC: 9 MG/DL (ref 8.5–10.5)
CALCIUM SERPL-MCNC: 9.5 MG/DL (ref 8.5–10.5)
CHLORIDE SERPL-SCNC: 102 MMOL/L (ref 96–112)
CO2 SERPL-SCNC: 24 MMOL/L (ref 20–33)
CREAT SERPL-MCNC: 0.79 MG/DL (ref 0.5–1.4)
EOSINOPHIL # BLD AUTO: 0.17 K/UL (ref 0–0.51)
EOSINOPHIL NFR BLD: 2.4 % (ref 0–6.9)
ERYTHROCYTE [DISTWIDTH] IN BLOOD BY AUTOMATED COUNT: 45.5 FL (ref 35.9–50)
GFR SERPLBLD CREATININE-BSD FMLA CKD-EPI: 83 ML/MIN/1.73 M 2
GLOBULIN SER CALC-MCNC: 3.8 G/DL (ref 1.9–3.5)
GLUCOSE SERPL-MCNC: 80 MG/DL (ref 65–99)
HCT VFR BLD AUTO: 57.3 % (ref 37–47)
HGB BLD-MCNC: 18.6 G/DL (ref 12–16)
IMM GRANULOCYTES # BLD AUTO: 0.03 K/UL (ref 0–0.11)
IMM GRANULOCYTES NFR BLD AUTO: 0.4 % (ref 0–0.9)
INR PPP: 1.02 (ref 0.87–1.13)
INR PPP: 1.13 (ref 0.87–1.13)
LYMPHOCYTES # BLD AUTO: 2.05 K/UL (ref 1–4.8)
LYMPHOCYTES NFR BLD: 28.4 % (ref 22–41)
MCH RBC QN AUTO: 28.8 PG (ref 27–33)
MCHC RBC AUTO-ENTMCNC: 32.5 G/DL (ref 32.2–35.5)
MCV RBC AUTO: 88.8 FL (ref 81.4–97.8)
MONOCYTES # BLD AUTO: 0.39 K/UL (ref 0–0.85)
MONOCYTES NFR BLD AUTO: 5.4 % (ref 0–13.4)
NEUTROPHILS # BLD AUTO: 4.5 K/UL (ref 1.82–7.42)
NEUTROPHILS NFR BLD: 62.2 % (ref 44–72)
NRBC # BLD AUTO: 0 K/UL
NRBC BLD-RTO: 0 /100 WBC (ref 0–0.2)
PLATELET # BLD AUTO: 223 K/UL (ref 164–446)
PMV BLD AUTO: 8.5 FL (ref 9–12.9)
POTASSIUM SERPL-SCNC: 3.9 MMOL/L (ref 3.6–5.5)
PROT SERPL-MCNC: 8.4 G/DL (ref 6–8.2)
PROTHROMBIN TIME: 13.4 SEC (ref 12–14.6)
PROTHROMBIN TIME: 14.5 SEC (ref 12–14.6)
RBC # BLD AUTO: 6.45 M/UL (ref 4.2–5.4)
SODIUM SERPL-SCNC: 139 MMOL/L (ref 135–145)
UFH PPP CHRO-ACNC: <0.1 IU/ML
WBC # BLD AUTO: 7.2 K/UL (ref 4.8–10.8)

## 2025-06-17 PROCEDURE — 93922 UPR/L XTREMITY ART 2 LEVELS: CPT

## 2025-06-17 PROCEDURE — 770020 HCHG ROOM/CARE - TELE (206)

## 2025-06-17 PROCEDURE — A9270 NON-COVERED ITEM OR SERVICE: HCPCS | Performed by: INTERNAL MEDICINE

## 2025-06-17 PROCEDURE — 700102 HCHG RX REV CODE 250 W/ 637 OVERRIDE(OP): Performed by: INTERNAL MEDICINE

## 2025-06-17 PROCEDURE — 73630 X-RAY EXAM OF FOOT: CPT | Mod: LT

## 2025-06-17 PROCEDURE — 85025 COMPLETE CBC W/AUTO DIFF WBC: CPT

## 2025-06-17 PROCEDURE — 99285 EMERGENCY DEPT VISIT HI MDM: CPT

## 2025-06-17 PROCEDURE — 96375 TX/PRO/DX INJ NEW DRUG ADDON: CPT

## 2025-06-17 PROCEDURE — 96365 THER/PROPH/DIAG IV INF INIT: CPT

## 2025-06-17 PROCEDURE — 85610 PROTHROMBIN TIME: CPT

## 2025-06-17 PROCEDURE — 99291 CRITICAL CARE FIRST HOUR: CPT | Performed by: INTERNAL MEDICINE

## 2025-06-17 PROCEDURE — 93926 LOWER EXTREMITY STUDY: CPT | Mod: 26,LT | Performed by: INTERNAL MEDICINE

## 2025-06-17 PROCEDURE — 96366 THER/PROPH/DIAG IV INF ADDON: CPT

## 2025-06-17 PROCEDURE — 99222 1ST HOSP IP/OBS MODERATE 55: CPT | Mod: 25 | Performed by: SURGERY

## 2025-06-17 PROCEDURE — 700111 HCHG RX REV CODE 636 W/ 250 OVERRIDE (IP): Performed by: INTERNAL MEDICINE

## 2025-06-17 PROCEDURE — 85730 THROMBOPLASTIN TIME PARTIAL: CPT

## 2025-06-17 PROCEDURE — 36415 COLL VENOUS BLD VENIPUNCTURE: CPT

## 2025-06-17 PROCEDURE — 99406 BEHAV CHNG SMOKING 3-10 MIN: CPT

## 2025-06-17 PROCEDURE — 93922 UPR/L XTREMITY ART 2 LEVELS: CPT | Mod: 26 | Performed by: INTERNAL MEDICINE

## 2025-06-17 PROCEDURE — 80053 COMPREHEN METABOLIC PANEL: CPT

## 2025-06-17 PROCEDURE — 93005 ELECTROCARDIOGRAM TRACING: CPT | Mod: TC | Performed by: INTERNAL MEDICINE

## 2025-06-17 PROCEDURE — 93926 LOWER EXTREMITY STUDY: CPT | Mod: LT

## 2025-06-17 PROCEDURE — 75635 CT ANGIO ABDOMINAL ARTERIES: CPT

## 2025-06-17 PROCEDURE — 700117 HCHG RX CONTRAST REV CODE 255: Performed by: EMERGENCY MEDICINE

## 2025-06-17 PROCEDURE — 85520 HEPARIN ASSAY: CPT | Mod: 91

## 2025-06-17 RX ORDER — ENOXAPARIN SODIUM 100 MG/ML
40 INJECTION SUBCUTANEOUS DAILY
Status: DISCONTINUED | OUTPATIENT
Start: 2025-06-17 | End: 2025-06-17

## 2025-06-17 RX ORDER — ACETAMINOPHEN 325 MG/1
650 TABLET ORAL EVERY 6 HOURS PRN
Status: DISCONTINUED | OUTPATIENT
Start: 2025-06-17 | End: 2025-06-20 | Stop reason: HOSPADM

## 2025-06-17 RX ORDER — ONDANSETRON 4 MG/1
4 TABLET, ORALLY DISINTEGRATING ORAL EVERY 4 HOURS PRN
Status: DISCONTINUED | OUTPATIENT
Start: 2025-06-17 | End: 2025-06-20 | Stop reason: HOSPADM

## 2025-06-17 RX ORDER — PROMETHAZINE HYDROCHLORIDE 25 MG/1
12.5-25 SUPPOSITORY RECTAL EVERY 4 HOURS PRN
Status: DISCONTINUED | OUTPATIENT
Start: 2025-06-17 | End: 2025-06-20 | Stop reason: HOSPADM

## 2025-06-17 RX ORDER — HEPARIN SODIUM 1000 [USP'U]/ML
80 INJECTION, SOLUTION INTRAVENOUS; SUBCUTANEOUS ONCE
Status: COMPLETED | OUTPATIENT
Start: 2025-06-17 | End: 2025-06-17

## 2025-06-17 RX ORDER — PROMETHAZINE HYDROCHLORIDE 25 MG/1
12.5-25 TABLET ORAL EVERY 4 HOURS PRN
Status: DISCONTINUED | OUTPATIENT
Start: 2025-06-17 | End: 2025-06-20 | Stop reason: HOSPADM

## 2025-06-17 RX ORDER — POLYETHYLENE GLYCOL 3350 17 G/17G
1 POWDER, FOR SOLUTION ORAL
Status: DISCONTINUED | OUTPATIENT
Start: 2025-06-17 | End: 2025-06-20 | Stop reason: HOSPADM

## 2025-06-17 RX ORDER — HEPARIN SODIUM 5000 [USP'U]/100ML
0-30 INJECTION, SOLUTION INTRAVENOUS CONTINUOUS
Status: DISCONTINUED | OUTPATIENT
Start: 2025-06-17 | End: 2025-06-18

## 2025-06-17 RX ORDER — LABETALOL HYDROCHLORIDE 5 MG/ML
20 INJECTION, SOLUTION INTRAVENOUS EVERY 4 HOURS PRN
Status: DISCONTINUED | OUTPATIENT
Start: 2025-06-17 | End: 2025-06-18

## 2025-06-17 RX ORDER — ONDANSETRON 2 MG/ML
4 INJECTION INTRAMUSCULAR; INTRAVENOUS EVERY 4 HOURS PRN
Status: DISCONTINUED | OUTPATIENT
Start: 2025-06-17 | End: 2025-06-20 | Stop reason: HOSPADM

## 2025-06-17 RX ORDER — HYDRALAZINE HYDROCHLORIDE 25 MG/1
25 TABLET, FILM COATED ORAL EVERY 8 HOURS
Status: DISCONTINUED | OUTPATIENT
Start: 2025-06-17 | End: 2025-06-20 | Stop reason: HOSPADM

## 2025-06-17 RX ORDER — ACETAMINOPHEN 500 MG
500-1000 TABLET ORAL EVERY 6 HOURS PRN
COMMUNITY

## 2025-06-17 RX ORDER — ALBUTEROL SULFATE 90 UG/1
2 INHALANT RESPIRATORY (INHALATION) EVERY 4 HOURS PRN
Status: DISCONTINUED | OUTPATIENT
Start: 2025-06-17 | End: 2025-06-20 | Stop reason: HOSPADM

## 2025-06-17 RX ORDER — AMLODIPINE BESYLATE 5 MG/1
5 TABLET ORAL DAILY
Status: DISCONTINUED | OUTPATIENT
Start: 2025-06-18 | End: 2025-06-20 | Stop reason: HOSPADM

## 2025-06-17 RX ORDER — HEPARIN SODIUM 1000 [USP'U]/ML
40 INJECTION, SOLUTION INTRAVENOUS; SUBCUTANEOUS PRN
Status: DISCONTINUED | OUTPATIENT
Start: 2025-06-17 | End: 2025-06-18

## 2025-06-17 RX ORDER — LOSARTAN POTASSIUM 50 MG/1
100 TABLET ORAL DAILY
Status: DISCONTINUED | OUTPATIENT
Start: 2025-06-18 | End: 2025-06-20 | Stop reason: HOSPADM

## 2025-06-17 RX ORDER — ATORVASTATIN CALCIUM 40 MG/1
80 TABLET, FILM COATED ORAL
Status: DISCONTINUED | OUTPATIENT
Start: 2025-06-18 | End: 2025-06-20 | Stop reason: HOSPADM

## 2025-06-17 RX ORDER — PROCHLORPERAZINE EDISYLATE 5 MG/ML
5-10 INJECTION INTRAMUSCULAR; INTRAVENOUS EVERY 4 HOURS PRN
Status: DISCONTINUED | OUTPATIENT
Start: 2025-06-17 | End: 2025-06-20 | Stop reason: HOSPADM

## 2025-06-17 RX ORDER — AMOXICILLIN 250 MG
2 CAPSULE ORAL EVERY EVENING
Status: DISCONTINUED | OUTPATIENT
Start: 2025-06-17 | End: 2025-06-20 | Stop reason: HOSPADM

## 2025-06-17 RX ADMIN — HEPARIN SODIUM 18 UNITS/KG/HR: 5000 INJECTION, SOLUTION INTRAVENOUS at 16:12

## 2025-06-17 RX ADMIN — IOHEXOL 96 ML: 350 INJECTION, SOLUTION INTRAVENOUS at 15:30

## 2025-06-17 RX ADMIN — HYDRALAZINE HYDROCHLORIDE 25 MG: 25 TABLET ORAL at 20:09

## 2025-06-17 RX ADMIN — HEPARIN SODIUM 4500 UNITS: 1000 INJECTION, SOLUTION INTRAVENOUS; SUBCUTANEOUS at 16:09

## 2025-06-17 RX ADMIN — ACETAMINOPHEN 650 MG: 325 TABLET ORAL at 20:09

## 2025-06-17 SDOH — ECONOMIC STABILITY: TRANSPORTATION INSECURITY
IN THE PAST 12 MONTHS, HAS THE LACK OF TRANSPORTATION KEPT YOU FROM MEDICAL APPOINTMENTS OR FROM GETTING MEDICATIONS?: NO

## 2025-06-17 ASSESSMENT — LIFESTYLE VARIABLES
ON A TYPICAL DAY WHEN YOU DRINK ALCOHOL HOW MANY DRINKS DO YOU HAVE: 0
HAVE PEOPLE ANNOYED YOU BY CRITICIZING YOUR DRINKING: NO
ALCOHOL_USE: NO
DOES PATIENT WANT TO STOP DRINKING: NO
TOTAL SCORE: 0
HAVE YOU EVER FELT YOU SHOULD CUT DOWN ON YOUR DRINKING: NO
EVER HAD A DRINK FIRST THING IN THE MORNING TO STEADY YOUR NERVES TO GET RID OF A HANGOVER: NO
AVERAGE NUMBER OF DAYS PER WEEK YOU HAVE A DRINK CONTAINING ALCOHOL: 0
EVER FELT BAD OR GUILTY ABOUT YOUR DRINKING: NO
HOW MANY TIMES IN THE PAST YEAR HAVE YOU HAD 5 OR MORE DRINKS IN A DAY: 0
CONSUMPTION TOTAL: NEGATIVE
TOTAL SCORE: 0
TOTAL SCORE: 0

## 2025-06-17 ASSESSMENT — ENCOUNTER SYMPTOMS
SPEECH CHANGE: 0
BLURRED VISION: 0
HEMOPTYSIS: 0
MYALGIAS: 0
PALPITATIONS: 0
NECK PAIN: 0
NAUSEA: 0
ORTHOPNEA: 0
CONSTIPATION: 0
CLAUDICATION: 0
WEAKNESS: 0
ABDOMINAL PAIN: 0
PHOTOPHOBIA: 0
COUGH: 0
FEVER: 0
DOUBLE VISION: 0
CHILLS: 0
DIZZINESS: 0
VOMITING: 0
DIARRHEA: 0
WEIGHT LOSS: 0

## 2025-06-17 ASSESSMENT — SOCIAL DETERMINANTS OF HEALTH (SDOH)
WITHIN THE LAST YEAR, HAVE YOU BEEN AFRAID OF YOUR PARTNER OR EX-PARTNER?: NO
WITHIN THE LAST YEAR, HAVE YOU BEEN HUMILIATED OR EMOTIONALLY ABUSED IN OTHER WAYS BY YOUR PARTNER OR EX-PARTNER?: NO
WITHIN THE PAST 12 MONTHS, THE FOOD YOU BOUGHT JUST DIDN'T LAST AND YOU DIDN'T HAVE MONEY TO GET MORE: NEVER TRUE
WITHIN THE PAST 12 MONTHS, YOU WORRIED THAT YOUR FOOD WOULD RUN OUT BEFORE YOU GOT THE MONEY TO BUY MORE: NEVER TRUE
IN THE PAST 12 MONTHS, HAS THE ELECTRIC, GAS, OIL, OR WATER COMPANY THREATENED TO SHUT OFF SERVICE IN YOUR HOME?: NO
WITHIN THE LAST YEAR, HAVE YOU BEEN KICKED, HIT, SLAPPED, OR OTHERWISE PHYSICALLY HURT BY YOUR PARTNER OR EX-PARTNER?: NO
WITHIN THE LAST YEAR, HAVE TO BEEN RAPED OR FORCED TO HAVE ANY KIND OF SEXUAL ACTIVITY BY YOUR PARTNER OR EX-PARTNER?: NO

## 2025-06-17 ASSESSMENT — PATIENT HEALTH QUESTIONNAIRE - PHQ9
SUM OF ALL RESPONSES TO PHQ9 QUESTIONS 1 AND 2: 0
1. LITTLE INTEREST OR PLEASURE IN DOING THINGS: NOT AT ALL
2. FEELING DOWN, DEPRESSED, IRRITABLE, OR HOPELESS: NOT AT ALL

## 2025-06-17 ASSESSMENT — PAIN DESCRIPTION - PAIN TYPE
TYPE: ACUTE PAIN
TYPE: ACUTE PAIN

## 2025-06-17 ASSESSMENT — FIBROSIS 4 INDEX: FIB4 SCORE: 1.18

## 2025-06-17 NOTE — ASSESSMENT & PLAN NOTE
I spent 5 minutes counseling the patient on cessation techniques and resources were offered including nicotine patches, gum, along with medical treatment with wellbutrin and chantix. The patient understands continuing to smoke could lead to complications such as lung disease, heart attack, stroke and death.  The benefits of stopping were also presented to him. The patient verbalized understanding. CPT CODE: 89624 (3-10 minutes tobacco counseling).   Discussed with Sandra about worsening peripheral artery disease and losing her from smoking, she understood.

## 2025-06-17 NOTE — ASSESSMENT & PLAN NOTE
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 Rodrigues was consulted and waiting for further recommendation according to surgery and anticoagulation after CTA  Admit the patient to telemetry  Continue atorvastatin  Start with heparin drip  N.p.o. after midnight

## 2025-06-17 NOTE — ED PROVIDER NOTES
ED Provider Note    CHIEF COMPLAINT  Chief Complaint   Patient presents with    Foot Swelling     Left foot pain with purple in color. Patient reports decreased sensation in foot due to previous stroke.        EXTERNAL RECORDS REVIEWED  Reviewed baseline laboratory studies medication list previous brain angiography interventions    HPI/ROS  LIMITATION TO HISTORY   None  OUTSIDE HISTORIAN(S):   provided additional history    Rubi Urbina is a 64 y.o. female who presents for evaluation of pain and discoloration to the left foot.  Patient has a complex medical history including previous AVM that was managed with interventional radiology.  She has no known history of peripheral arterial disease.  She reports that she likely had some sort of neuropathy.  She does not experience pain in her extremities especially her feet the same.  She did sustain a crush injury to her foot around 2 weeks ago.  She did not seek any medical care or have a radiograph.  She noticed that her foot became increasing more painful and now she has had purpleish discoloration to all 5 toes.  Her  could not palpate a pulse on top of the foot and was concerned about blood flow crisis and therefore brings her in for evaluation today.    PAST MEDICAL HISTORY   has a past medical history of Asthma, AVM (arteriovenous malformation) (rut/durga/ann, 2008), Dental disorder, High cholesterol, Migraine, Partial bowel obstruction (HCC), Seizure disorder (HCC), Snoring, and Stroke (HCC) (2005ish).    SURGICAL HISTORY   has a past surgical history that includes colon resection; colostomy takedown; craniotomy (2005ish); tonsillectomy; tubal coagulation laparoscopic bilateral; and ventral hernia repair (6/27/2019).    FAMILY HISTORY  Family History   Problem Relation Age of Onset    Lung Disease Mother         asthma    Heart Attack Mother     Diabetes Father     Cancer Neg Hx        SOCIAL HISTORY  Social History     Tobacco Use     "Smoking status: Every Day     Current packs/day: 1.00     Average packs/day: 1 pack/day for 30.0 years (30.0 ttl pk-yrs)     Types: Cigarettes     Passive exposure: Never    Smokeless tobacco: Never    Tobacco comments:     Started smoking at age 16 pack a day    Vaping Use    Vaping status: Never Used   Substance and Sexual Activity    Alcohol use: Yes     Comment: occ    Drug use: No    Sexual activity: Never     Partners: Male     Birth control/protection: Post-Menopausal       CURRENT MEDICATIONS  Home Medications       Reviewed by Smitha Bourgeois R.N. (Registered Nurse) on 06/17/25 at 0946  Med List Status: Not Addressed     Medication Last Dose Status   albuterol 108 (90 Base) MCG/ACT Aero Soln inhalation aerosol  Active   amLODIPine (NORVASC) 5 MG Tab  Active   atorvastatin (LIPITOR) 40 MG Tab  Active   Cholecalciferol (VITAMIN D3 PO)  Active   losartan (COZAAR) 100 MG Tab  Active   OXcarbazepine (TRILEPTAL) 300 MG Tab  Active                    ALLERGIES  Allergies[1]    PHYSICAL EXAM  VITAL SIGNS: BP (!) 149/82   Pulse 82   Temp 36.5 °C (97.7 °F) (Temporal)   Resp 18   Ht 1.575 m (5' 2\")   Wt 66.5 kg (146 lb 9.7 oz)   LMP  (LMP Unknown)   SpO2 95%   BMI 26.81 kg/m²    Pulse ox interpretation: I interpret this pulse ox as normal.  Constitutional: Alert and oriented x 3, moderate distress  HEENT: Atraumatic normocephalic, pupils are equal round reactive to light extraocular movements are intact. The nares is clear, external ears are normal, mouth shows moist mucous membranes normal dentition for age  Neck: Supple, no JVD no tracheal deviation  Cardiovascular: Regular rate and rhythm no murmur rub or gallop 2+ pulses peripherally x4  Thorax & Lungs: No respiratory distress, no wheezes rales or rhonchi, No chest tenderness.   GI: Soft nontender nondistended positive bowel sounds, no peritoneal signs  Skin: Warm dry no acute rash or lesion  Musculoskeletal: Moving all extremities with full range and 5 of " 5 strength no acute  deformity  Left lower extremity exam is notable for significant discoloration of the great toe as well as the 3rd, 4th and 5th digits with delayed capillary refill.  I cannot palpate a dorsalis pedal pulse.  Patient has obvious rest pain.    Neurologic: Cranial nerves III through XII are grossly intact no sensory deficit no cerebellar dysfunction   Psychiatric: Appropriate affect for situation at this time          EKG/LABS  Results for orders placed or performed during the hospital encounter of 06/17/25   CBC WITH DIFFERENTIAL    Collection Time: 06/17/25 12:04 PM   Result Value Ref Range    WBC 7.2 4.8 - 10.8 K/uL    RBC 6.45 (H) 4.20 - 5.40 M/uL    Hemoglobin 18.6 (H) 12.0 - 16.0 g/dL    Hematocrit 57.3 (H) 37.0 - 47.0 %    MCV 88.8 81.4 - 97.8 fL    MCH 28.8 27.0 - 33.0 pg    MCHC 32.5 32.2 - 35.5 g/dL    RDW 45.5 35.9 - 50.0 fL    Platelet Count 223 164 - 446 K/uL    MPV 8.5 (L) 9.0 - 12.9 fL    Neutrophils-Polys 62.20 44.00 - 72.00 %    Lymphocytes 28.40 22.00 - 41.00 %    Monocytes 5.40 0.00 - 13.40 %    Eosinophils 2.40 0.00 - 6.90 %    Basophils 1.20 0.00 - 1.80 %    Immature Granulocytes 0.40 0.00 - 0.90 %    Nucleated RBC 0.00 0.00 - 0.20 /100 WBC    Neutrophils (Absolute) 4.50 1.82 - 7.42 K/uL    Lymphs (Absolute) 2.05 1.00 - 4.80 K/uL    Monos (Absolute) 0.39 0.00 - 0.85 K/uL    Eos (Absolute) 0.17 0.00 - 0.51 K/uL    Baso (Absolute) 0.09 0.00 - 0.12 K/uL    Immature Granulocytes (abs) 0.03 0.00 - 0.11 K/uL    NRBC (Absolute) 0.00 K/uL   Comp Metabolic Panel    Collection Time: 06/17/25 12:04 PM   Result Value Ref Range    Sodium 139 135 - 145 mmol/L    Potassium 3.9 3.6 - 5.5 mmol/L    Chloride 102 96 - 112 mmol/L    Co2 24 20 - 33 mmol/L    Anion Gap 13.0 7.0 - 16.0    Glucose 80 65 - 99 mg/dL    Bun 13 8 - 22 mg/dL    Creatinine 0.79 0.50 - 1.40 mg/dL    Calcium 9.5 8.5 - 10.5 mg/dL    Correct Calcium 9.0 8.5 - 10.5 mg/dL    AST(SGOT) 23 12 - 45 U/L    ALT(SGPT) 16 2 - 50 U/L     Alkaline Phosphatase 116 (H) 30 - 99 U/L    Total Bilirubin 0.4 0.1 - 1.5 mg/dL    Albumin 4.6 3.2 - 4.9 g/dL    Total Protein 8.4 (H) 6.0 - 8.2 g/dL    Globulin 3.8 (H) 1.9 - 3.5 g/dL    A-G Ratio 1.2 g/dL   Prothrombin Time    Collection Time: 06/17/25 12:57 PM   Result Value Ref Range    PT 13.4 12.0 - 14.6 sec    INR 1.02 0.87 - 1.13   APTT    Collection Time: 06/17/25 12:57 PM   Result Value Ref Range    APTT 27.4 24.7 - 36.0 sec       I have independently interpreted this EKG    RADIOLOGY/PROCEDURES   I have independently interpreted the diagnostic imaging associated with this visit and am waiting the final reading from the radiologist.   My preliminary interpretation is as follows: Significant left-sided peripheral arterial disease with diminished ankle-brachial index    Radiologist interpretation:  No orders to display   FINDINGS   Left.    Diffuse plaque and monophasic flow throughout the common femoral,    superficial femoral, and popliteal arteries with no hemodynamically    significant stenosis.    Segmental occlusion of the distal posterior tibial artery.     Two vessel runoff to the ankle with monophasic flow.    Abnormal Doppler waveforms in the common femoral artery indicate    significant disease in the iliac segment.    > 75% stenosis in the proximal left external iliac artery.    Monophasic flow through the left common iliac artery without evidence of    stenosis.    Doppler waveforms of the distal aorta are of high amplitude and biphasic.    COURSE & MEDICAL DECISION MAKING    ASSESSMENT, COURSE AND PLAN  C  RIGHT      Waveform            Systolic BPs (mmHg)                              179           Brachial   Biphasic                                 Common Femoral   Biphasic                                 Popliteal   Biphasic                   175           Posterior Tibial   Biphasic                   197           Dorsalis Pedis   Normal                                   Digit                               1.09          RATNA                                            TBI                           LEFT   Waveform        Systolic BPs (mmHg)                              181           Brachial   Monophasic                               Common Femoral   Monophasic                               Popliteal   Monophasic                 76            Posterior Tibial   Monophasic                 69            Dorsalis Pedis   Flatlined                                Digit                              0.42          RATNA                                            TBI         Findings   Right.    The ankle-brachial index is normal.    Doppler waveforms of the common femoral, popliteal, posterior tibial, and    dorsalis pedis arteries are of high amplitude and multiphasic.    All digit PPG waveforms are normal.       Left.    The ankle-brachial index is severely reduced.    Doppler waveforms of the common femoral, popliteal, posterior tibial, and    dorsalis pedis arteries are monophasic.    The Doppler waveform of the common femoral artery is abnormally dampened,    consistent with significant arterial disease in the iliac segment.    All digit PPG waveforms are flatlined or nearly flatlined.        An arterial duplex was performed in accordance with lower extremity    arterial evaluation protocol - see separate report. are     C  Care Narrative:     This is a very pleasant 64-year-old female whose had what is likely undiagnosed peripheral arterial disease that has now escalated to rest pain and suggestion of acute ischemia.  On arrival her left great toe 3rd, 4th and 5th toes are cyanotic and dusky.  She has no palpable dorsalis pedal pulse.  I immediately ordered laboratory studies as well as lower extremity arterial ultrasound demonstrating significant peripheral arterial disease with monophasic flow and a substantially depressed ankle-brachial index.  Emergent consultation with Dr. Rodrigues was obtained.  His  physician assistant is also examined the patient at the bedside.  Patient will be admitted to the hospitalist service.  I have ordered a CTA with runoff of the left lower extremity to better clarify the extent of peripheral arterial disease.  Patient will be admitted to the hospitalist service for further treatment and evaluation.  Vascular surgery is discussing whether or not to heparinize the patient at this time .  The patient has a limb threatening case of acute ischemia with peripheral arterial disease.    ADDITIONAL PROBLEMS MANAGED      DISPOSITION AND DISCUSSIONS  I have discussed management of the patient with the following physicians and NASEEM's: Discussed plan of care with vascular surgery discussed plan of care with admitting hospitalist    Discussion of management with other QHP or appropriate source(s): None    Escalation of care considered, and ultimately not performed: Will consider heparinization    Barriers to care at this time, including but not limited to: None.     Decision tools and prescription drugs considered including, but not limited to: None.    FINAL DIAGNOSIS  1. Acute lower limb ischemia          CRITICAL CARE  The very real possibilty of a deterioration of this patient's condition required the highest level of my preparedness for sudden, emergent intervention.  I provided critical care services, which included medication orders, frequent reevaluations of the patient's condition and response to treatment, ordering and reviewing test results, and discussing the case with various consultants.  The critical care time associated with the care of the patient was 35 minutes. Review chart for interventions. This time is exclusive of any other billable procedures.        Electronically signed by: Brendan Hawkins M.D., 6/17/2025 10:32 AM           [1] No Known Allergies

## 2025-06-17 NOTE — H&P
Hospital Medicine History & Physical Note    Date of Service  6/17/2025    Primary Care Physician  Isaias Mccray D.O.    Consultants  Vascular surgeon Dr. Rodrigues    Code Status  Full Code    Chief Complaint  Chief Complaint   Patient presents with    Foot Swelling     Left foot pain with purple in color. Patient reports decreased sensation in foot due to previous stroke.        History of Presenting Illness    64-year-old female with history of asthma, smoking, dyslipidemia, and seizure who presented 6/17 with pain on the left foot.  Her symptoms started last February with some pain on her left foot and later started having discoloration with sharp pain especially at night, comes and goes, patient noticed dark discoloration on all her toes beginning of May however she did not go to the emergency or PCP.  Patient came on 6/17 with worsening pain.  On admission patient denied any chest pain or shortness of breath and no other symptoms, on exam patient had discoloration on all her toes specially fifth toe on the left leg.  Posterior tibial artery pulse were okay however dorsalis pedis artery pulse was not intact.  ED physician discussed the case with Dr. Rodrigues vascular surgeon and planning for CTA and possible surgery, vascular surgeon did not get any recommendation for anticoagulation until CTA and possible surgery.    I discussed the plan of care with patient, bedside RN, and ED physician.    Review of Systems  Review of Systems   Constitutional:  Positive for malaise/fatigue. Negative for chills, fever and weight loss.   HENT:  Negative for ear pain, hearing loss and tinnitus.    Eyes:  Negative for blurred vision, double vision and photophobia.   Respiratory:  Negative for cough and hemoptysis.    Cardiovascular:  Negative for chest pain, palpitations, orthopnea and claudication.   Gastrointestinal:  Negative for abdominal pain, constipation, diarrhea, nausea and vomiting.   Genitourinary:  Negative for  dysuria, frequency and urgency.   Musculoskeletal:  Negative for myalgias and neck pain.   Skin:  Positive for itching and rash.   Neurological:  Negative for dizziness, speech change and weakness.       Past Medical History   has a past medical history of Asthma, AVM (arteriovenous malformation) (rut/durga/ann, 2008), Dental disorder, High cholesterol, Migraine, Partial bowel obstruction (HCC), Seizure disorder (HCC), Snoring, and Stroke (HCC) (2005ish).    Surgical History   has a past surgical history that includes colon resection; colostomy takedown; craniotomy (2005ish); tonsillectomy; tubal coagulation laparoscopic bilateral; and ventral hernia repair (6/27/2019).     Family History  Family History   Problem Relation Age of Onset    Lung Disease Mother         asthma    Heart Attack Mother     Diabetes Father     Cancer Neg Hx         Family history reviewed with patient. There is no family history that is pertinent to the chief complaint.     Social History   reports that she has been smoking cigarettes. She has a 30 pack-year smoking history. She has never been exposed to tobacco smoke. She has never used smokeless tobacco. She reports current alcohol use. She reports that she does not use drugs.    Allergies  Allergies[1]    Medications  Prior to Admission Medications   Prescriptions Last Dose Informant Patient Reported? Taking?   Cholecalciferol (VITAMIN D3 PO) 6/17/2025 Morning Patient Yes Yes   Sig: Take 1 Tablet by mouth every day.   OXcarbazepine (TRILEPTAL) 300 MG Tab 6/17/2025 Morning Patient No Yes   Sig: Take 1 Tablet by mouth 2 times a day.   acetaminophen (TYLENOL) 500 MG Tab 6/17/2025 at  9:00 AM Patient Yes Yes   Sig: Take 500-1,000 mg by mouth every 6 hours as needed. Indications: Pain   albuterol 108 (90 Base) MCG/ACT Aero Soln inhalation aerosol Unknown Patient No No   Sig: Inhale 2 Puffs every four hours as needed for Shortness of Breath.   amLODIPine (NORVASC) 5 MG Tab 6/17/2025  Morning Patient No Yes   Sig: Take 1 Tablet by mouth every day.   atorvastatin (LIPITOR) 40 MG Tab 6/17/2025 Morning Patient No Yes   Sig: Take 1 Tablet by mouth at bedtime.   losartan (COZAAR) 100 MG Tab 6/17/2025 Morning Patient No Yes   Sig: Take 1 Tablet by mouth every day.      Facility-Administered Medications: None       Physical Exam  Temp:  [36.5 °C (97.7 °F)] 36.5 °C (97.7 °F)  Pulse:  [62-82] 68  Resp:  [18] 18  BP: (145-179)/(67-82) 161/79  SpO2:  [91 %-95 %] 95 %  Blood Pressure: (!) 155/72   Temperature: 36.5 °C (97.7 °F)   Pulse: 65   Respiration: 18   Pulse Oximetry: 92 %       Physical Exam  Constitutional:       General: She is not in acute distress.     Appearance: She is not ill-appearing.   HENT:      Head: Normocephalic and atraumatic.   Pulmonary:      Effort: No respiratory distress.      Breath sounds: No wheezing or rales.   Abdominal:      General: There is no distension.      Tenderness: There is no abdominal tenderness.   Musculoskeletal:         General: Signs of injury present.      Right lower leg: No edema.      Left lower leg: No edema.      Comments: Discoloration on the toes on the left leg  Posterior tibial artery pulses are intact  Dorsalis pedis pulse are diminished on the left leg   Skin:     Coloration: Skin is pale.      Findings: Erythema, lesion and rash present.   Neurological:      Mental Status: She is oriented to person, place, and time. Mental status is at baseline.      Motor: Weakness present.      Coordination: Coordination abnormal.         Laboratory:  Recent Labs     06/17/25  1204   WBC 7.2   RBC 6.45*   HEMOGLOBIN 18.6*   HEMATOCRIT 57.3*   MCV 88.8   MCH 28.8   MCHC 32.5   RDW 45.5   PLATELETCT 223   MPV 8.5*     Recent Labs     06/17/25  1204   SODIUM 139   POTASSIUM 3.9   CHLORIDE 102   CO2 24   GLUCOSE 80   BUN 13   CREATININE 0.79   CALCIUM 9.5     Recent Labs     06/17/25  1204   ALTSGPT 16   ASTSGOT 23   ALKPHOSPHAT 116*   TBILIRUBIN 0.4   GLUCOSE 80  "    Recent Labs     06/17/25  1257   APTT 27.4   INR 1.02     No results for input(s): \"NTPROBNP\" in the last 72 hours.      No results for input(s): \"TROPONINT\" in the last 72 hours.    Imaging:  CT-CTA AORTA-RO WITH & W/O-POST PROCESS   Final Result      1.  Probable moderate to severe stenosis of the origin of the left external iliac artery with decreased contrast opacification distally throughout the left lower extremity.   2.  The left internal iliac artery also demonstrates moderate to severe multifocal stenosis near the origin with patency of distal vessels.   3.  Single vessel supply to the left foot provided by the posterior tibial artery.   4.  Large left-sided abdominal wall hernia containing the splenic flexure of the colon.   5.  Hepatomegaly.         US-EXTREMITY ARTERY LOWER UNILAT LEFT   Final Result      US-RATNA SINGLE LEVEL BILAT   Final Result      DX-FOOT-COMPLETE 3+ LEFT   Final Result      Deformity with associated transversely oriented lucency of the first distal phalanx which could indicate age-indeterminate fracture.          X-Ray:  I have personally reviewed the images and compared with prior images.  EKG:  I have personally reviewed the images and compared with prior images.    Assessment/Plan:  Justification for Admission Status  I anticipate this patient will require at least two midnights for appropriate medical management, necessitating inpatient admission because limb ischemia    Patient will need a Telemetry bed on CARDIOLOGY service .  The need is secondary to peripheral artery disease.    * Lower limb ischemia- (present on admission)  Assessment & Plan  Likely chronic, months with discoloration in her toes  Came with worsening pain  CTA showed moderate to severe stenosis in the origin of the left iliac artery  Vascular surgeon Dr. Rodrigues was consulted and waiting for further recommendation according to surgery and anticoagulation after CTA  Admit the patient to telemetry  Continue " atorvastatin  Start with heparin drip  N.p.o. after midnight      Primary hypertension- (present on admission)  Assessment & Plan  Continue amlodipine and losartan    Seizure disorder (HCC)- (present on admission)  Assessment & Plan  Continue home medication oxcarbazepine  Follow-up with seizure clinic.    History of cerebrovascular accident (CVA) with residual deficit- (present on admission)  Assessment & Plan  Left-sided weakness and discoordination  Patient is not on aspirin or antiplatelets  Continue atorvastatin and consider aspirin    Cigarette smoker- (present on admission)  Assessment & Plan  I spent 5 minutes counseling the patient on cessation techniques and resources were offered including nicotine patches, gum, along with medical treatment with wellbutrin and chantix. The patient understands continuing to smoke could lead to complications such as lung disease, heart attack, stroke and death.  The benefits of stopping were also presented to him. The patient verbalized understanding. CPT CODE: 33033 (3-10 minutes tobacco counseling).   Discussed with Sandra about worsening peripheral artery disease and losing her from smoking, she understood.        VTE prophylaxis: SCDs/TEDs and enoxaparin ppx      Patient is critically ill.   The patient continues to have: Severe peripheral artery disease  The vital organ system that is affected is the: Her left leg  If untreated there is a high chance of deterioration into: Ischemia to the left leg, monitoring for any signs of worsening ischemia, start with heparin drip and watching for any signs of bleeding  The critical care that I am providing today is: IV heparin  The critical that has been undertaken is medically complex.   There has been no overlap in critical care time.   Critical Care Time not including procedures: 32         [1] No Known Allergies

## 2025-06-17 NOTE — ED TRIAGE NOTES
Chief Complaint   Patient presents with    Foot Swelling     Left foot pain with purple in color. Patient reports decreased sensation in foot due to previous stroke.

## 2025-06-17 NOTE — ASSESSMENT & PLAN NOTE
Left-sided weakness and discoordination  Patient is not on aspirin or antiplatelets  Continue atorvastatin and consider aspirin

## 2025-06-17 NOTE — CONSULTS
DATE OF CONSULTATION: 6/17/2025    CONSULTING PHYSICIAN: Dr. Lilia ABBOTT    REASON FOR CONSULTATION: toe ischemia.     HISTORY OF PRESENT ILLNESS:     The patient is a 64 y.o. female who presents to [facility] ED with left toe discoloration and pain. She states she developed left foot cramping and numbness about a month ago that has been progressively worsening.  During this time she dropped a heavy object on her left foot and thought that the discoloration was bruising however it has not subsided.  In addition she is reporting rest pain in the left foot at night.  She is a daily cigarette smoker.     PAST MEDICAL HISTORY:  has a past medical history of Asthma, AVM (arteriovenous malformation) (rut/durga/ann, 2008), Dental disorder, High cholesterol, Migraine, Partial bowel obstruction (HCC), Seizure disorder ([facility]), Snoring, and Stroke ([facility]) (2005ish).     PAST SURGICAL HISTORY:  has a past surgical history that includes colon resection; colostomy takedown; craniotomy (2005ish); tonsillectomy; tubal coagulation laparoscopic bilateral; and ventral hernia repair (6/27/2019).     ALLERGIES: Allergies[1]     CURRENT MEDICATIONS:   Home Medications       Reviewed by Smitha Bourgeois R.N. (Registered Nurse) on 06/17/25 at 0946  Med List Status: Not Addressed     Medication Last Dose Status   albuterol 108 (90 Base) MCG/ACT Aero Soln inhalation aerosol  Active   amLODIPine (NORVASC) 5 MG Tab  Active   atorvastatin (LIPITOR) 40 MG Tab  Active   Cholecalciferol (VITAMIN D3 PO)  Active   losartan (COZAAR) 100 MG Tab  Active   OXcarbazepine (TRILEPTAL) 300 MG Tab  Active                  Audit from Redirected Encounters    **Home medications have not yet been reviewed for this encounter**         FAMILY HISTORY:   Family History   Problem Relation Age of Onset    Lung Disease Mother         asthma    Heart Attack Mother     Diabetes Father     Cancer Neg Hx        History reviewed. No pertinent family history.       SOCIAL  HISTORY:   Social History     Tobacco Use    Smoking status: Every Day     Current packs/day: 1.00     Average packs/day: 1 pack/day for 30.0 years (30.0 ttl pk-yrs)     Types: Cigarettes     Passive exposure: Never    Smokeless tobacco: Never    Tobacco comments:     Started smoking at age 16 pack a day    Vaping Use    Vaping status: Never Used   Substance and Sexual Activity    Alcohol use: Yes     Comment: occ    Drug use: No    Sexual activity: Never     Partners: Male     Birth control/protection: Post-Menopausal       ROS       All other systems were reviewed and are negative      Exam:       Vitals:    06/17/25 1403   BP: (!) 155/72   Pulse: 65   Resp:    Temp:    SpO2: 92%      Constitutional: Alert and oriented x 3, no acute distress  HEENT: Atraumatic normocephalic, pupils are equal round reactive to light extraocular movements are intact. The nares is clear, external ears are normal, mouth shows moist mucous membranes normal dentition for age  Neck: Supple, no JVD no tracheal deviation  Cardiovascular: Regular rate and rhythm no murmur. Left toe with blue discoloration, cool to the touch, pain to palpation.  Non palpable pedal pulse. Foot cool to touch   Right foot warm, well perfused, palpable pedal pulse. Palpable right femoral pulse.   Compartments soft.   Abdomen soft and nontender   Thorax & Lungs: No respiratory distress, no wheezes rales or rhonchi, No chest tenderness.   GI: Soft nontender nondistended positive bowel sounds, no peritoneal signs  Skin: Warm dry no acute rash or lesion  Musculoskeletal: Moving all extremities with full range and 5 of 5 strength no acute deformity  Left lower extremity exam is notable for significant discoloration of the great toe as well as the 3rd, 4th and 5th digits with delayed capillary refill.  I cannot palpate a dorsalis pedal pulse.  Patient has obvious rest pain.        LABORATORY VALUES:   Recent Labs     06/17/25  1204   WBC 7.2   RBC 6.45*   HEMOGLOBIN  18.6*   HEMATOCRIT 57.3*   MCV 88.8   MCH 28.8   MCHC 32.5   RDW 45.5   PLATELETCT 223   MPV 8.5*     Recent Labs     06/17/25  1204   SODIUM 139   POTASSIUM 3.9   CHLORIDE 102   CO2 24   GLUCOSE 80   BUN 13   CREATININE 0.79   CALCIUM 9.5     Recent Labs     06/17/25  1204 06/17/25  1257   ASTSGOT 23  --    ALTSGPT 16  --    TBILIRUBIN 0.4  --    ALKPHOSPHAT 116*  --    GLOBULIN 3.8*  --    INR  --  1.02     Recent Labs     06/17/25  1257   APTT 27.4   INR 1.02        IMAGING:   US-EXTREMITY ARTERY LOWER UNILAT LEFT         US-RATNA SINGLE LEVEL BILAT         DX-FOOT-COMPLETE 3+ LEFT   Final Result      Deformity with associated transversely oriented lucency of the first distal phalanx which could indicate age-indeterminate fracture.      CT-CTA AORTA-RO WITH & W/O-POST PROCESS    (Results Pending)       IMPRESSION AND PLAN:  PAD with rest pain   Daily cigarette use       Recommend admitting patient to hospitalist service for further management and evaluation. We will obtain a CTA aorta with runoff to further assess the vascular system.  Further recommendations pending study.  Keep NPO after midnight for possible intervention tomorrow.     The patient was seen and examined with Dr. Rodrigues who agrees with the above plan.            Ivelisse Richard P.A.-C.    DD: 6/17/2025   DT: 2:29 PM           [1] No Known Allergies

## 2025-06-18 ENCOUNTER — APPOINTMENT (OUTPATIENT)
Dept: RADIOLOGY | Facility: MEDICAL CENTER | Age: 65
DRG: 279 | End: 2025-06-18
Attending: SURGERY
Payer: MEDICARE

## 2025-06-18 LAB
ALBUMIN SERPL BCP-MCNC: 3.7 G/DL (ref 3.2–4.9)
ANION GAP SERPL CALC-SCNC: 12 MMOL/L (ref 7–16)
BUN SERPL-MCNC: 15 MG/DL (ref 8–22)
CALCIUM ALBUM COR SERPL-MCNC: 9 MG/DL (ref 8.5–10.5)
CALCIUM SERPL-MCNC: 8.8 MG/DL (ref 8.5–10.5)
CHLORIDE SERPL-SCNC: 107 MMOL/L (ref 96–112)
CHOLEST SERPL-MCNC: 163 MG/DL (ref 100–199)
CO2 SERPL-SCNC: 20 MMOL/L (ref 20–33)
CREAT SERPL-MCNC: 0.76 MG/DL (ref 0.5–1.4)
EKG IMPRESSION: NORMAL
ERYTHROCYTE [DISTWIDTH] IN BLOOD BY AUTOMATED COUNT: 46 FL (ref 35.9–50)
EST. AVERAGE GLUCOSE BLD GHB EST-MCNC: 111 MG/DL
GFR SERPLBLD CREATININE-BSD FMLA CKD-EPI: 87 ML/MIN/1.73 M 2
GLUCOSE SERPL-MCNC: 92 MG/DL (ref 65–99)
HBA1C MFR BLD: 5.5 % (ref 4–5.6)
HCT VFR BLD AUTO: 51.1 % (ref 37–47)
HDLC SERPL-MCNC: 48 MG/DL
HGB BLD-MCNC: 16.7 G/DL (ref 12–16)
LDLC SERPL CALC-MCNC: 79 MG/DL
MAGNESIUM SERPL-MCNC: 2 MG/DL (ref 1.5–2.5)
MCH RBC QN AUTO: 29 PG (ref 27–33)
MCHC RBC AUTO-ENTMCNC: 32.7 G/DL (ref 32.2–35.5)
MCV RBC AUTO: 88.9 FL (ref 81.4–97.8)
PHOSPHATE SERPL-MCNC: 3.9 MG/DL (ref 2.5–4.5)
PLATELET # BLD AUTO: 183 K/UL (ref 164–446)
PMV BLD AUTO: 8.6 FL (ref 9–12.9)
POTASSIUM SERPL-SCNC: 4.3 MMOL/L (ref 3.6–5.5)
RBC # BLD AUTO: 5.75 M/UL (ref 4.2–5.4)
SODIUM SERPL-SCNC: 139 MMOL/L (ref 135–145)
TRIGL SERPL-MCNC: 179 MG/DL (ref 0–149)
UFH PPP CHRO-ACNC: 0.71 IU/ML
UFH PPP CHRO-ACNC: >1.1 IU/ML
WBC # BLD AUTO: 6 K/UL (ref 4.8–10.8)

## 2025-06-18 PROCEDURE — 700111 HCHG RX REV CODE 636 W/ 250 OVERRIDE (IP): Performed by: SURGERY

## 2025-06-18 PROCEDURE — 85520 HEPARIN ASSAY: CPT

## 2025-06-18 PROCEDURE — 36415 COLL VENOUS BLD VENIPUNCTURE: CPT

## 2025-06-18 PROCEDURE — 770020 HCHG ROOM/CARE - TELE (206)

## 2025-06-18 PROCEDURE — B41D1ZZ FLUOROSCOPY OF AORTA AND BILATERAL LOWER EXTREMITY ARTERIES USING LOW OSMOLAR CONTRAST: ICD-10-PCS | Performed by: SURGERY

## 2025-06-18 PROCEDURE — 80069 RENAL FUNCTION PANEL: CPT

## 2025-06-18 PROCEDURE — 160027 HCHG SURGERY MINUTES - 1ST 30 MINS LEVEL 2: Performed by: SURGERY

## 2025-06-18 PROCEDURE — 160195 HCHG PACU COMPLEX - 1ST 60 MINS: Performed by: SURGERY

## 2025-06-18 PROCEDURE — C1887 CATHETER, GUIDING: HCPCS | Performed by: SURGERY

## 2025-06-18 PROCEDURE — 160038 HCHG SURGERY MINUTES - EA ADDL 1 MIN LEVEL 2: Performed by: SURGERY

## 2025-06-18 PROCEDURE — C1760 CLOSURE DEV, VASC: HCPCS | Performed by: SURGERY

## 2025-06-18 PROCEDURE — 94664 DEMO&/EVAL PT USE INHALER: CPT

## 2025-06-18 PROCEDURE — 047J34Z DILATION OF LEFT EXTERNAL ILIAC ARTERY WITH DRUG-ELUTING INTRALUMINAL DEVICE, PERCUTANEOUS APPROACH: ICD-10-PCS | Performed by: SURGERY

## 2025-06-18 PROCEDURE — 83036 HEMOGLOBIN GLYCOSYLATED A1C: CPT

## 2025-06-18 PROCEDURE — 80061 LIPID PANEL: CPT

## 2025-06-18 PROCEDURE — 75716 ARTERY X-RAYS ARMS/LEGS: CPT | Mod: 26,59 | Performed by: SURGERY

## 2025-06-18 PROCEDURE — 99153 MOD SED SAME PHYS/QHP EA: CPT | Performed by: SURGERY

## 2025-06-18 PROCEDURE — 160196 HCHG PACU COMPLEX - EA ADDL 30 MINS: Performed by: SURGERY

## 2025-06-18 PROCEDURE — 700102 HCHG RX REV CODE 250 W/ 637 OVERRIDE(OP): Performed by: SURGERY

## 2025-06-18 PROCEDURE — 93010 ELECTROCARDIOGRAM REPORT: CPT | Performed by: INTERNAL MEDICINE

## 2025-06-18 PROCEDURE — C1725 CATH, TRANSLUMIN NON-LASER: HCPCS | Performed by: SURGERY

## 2025-06-18 PROCEDURE — 04FJ3ZZ FRAGMENTATION OF LEFT EXTERNAL ILIAC ARTERY, PERCUTANEOUS APPROACH: ICD-10-PCS | Performed by: SURGERY

## 2025-06-18 PROCEDURE — 99152 MOD SED SAME PHYS/QHP 5/>YRS: CPT | Performed by: SURGERY

## 2025-06-18 PROCEDURE — 85027 COMPLETE CBC AUTOMATED: CPT

## 2025-06-18 PROCEDURE — 83735 ASSAY OF MAGNESIUM: CPT

## 2025-06-18 PROCEDURE — 160015 HCHG STAT PREOP MINUTES: Performed by: SURGERY

## 2025-06-18 PROCEDURE — 700117 HCHG RX CONTRAST REV CODE 255: Performed by: SURGERY

## 2025-06-18 PROCEDURE — C1876 STENT, NON-COA/NON-COV W/DEL: HCPCS | Performed by: SURGERY

## 2025-06-18 PROCEDURE — 75625 CONTRAST EXAM ABDOMINL AORTA: CPT | Mod: 26 | Performed by: SURGERY

## 2025-06-18 PROCEDURE — A9270 NON-COVERED ITEM OR SERVICE: HCPCS | Performed by: INTERNAL MEDICINE

## 2025-06-18 PROCEDURE — C1894 INTRO/SHEATH, NON-LASER: HCPCS | Performed by: SURGERY

## 2025-06-18 PROCEDURE — 700102 HCHG RX REV CODE 250 W/ 637 OVERRIDE(OP): Performed by: INTERNAL MEDICINE

## 2025-06-18 PROCEDURE — 99233 SBSQ HOSP IP/OBS HIGH 50: CPT | Performed by: STUDENT IN AN ORGANIZED HEALTH CARE EDUCATION/TRAINING PROGRAM

## 2025-06-18 PROCEDURE — 160048 HCHG OR STATISTICAL LEVEL 1-5: Performed by: SURGERY

## 2025-06-18 PROCEDURE — 37221 PR REVASCULARIZE ILIAC ARTERY,ANGIOPLASTY/ST*: CPT | Mod: LT | Performed by: SURGERY

## 2025-06-18 PROCEDURE — A9270 NON-COVERED ITEM OR SERVICE: HCPCS | Performed by: SURGERY

## 2025-06-18 PROCEDURE — 37222: CPT | Mod: LT | Performed by: SURGERY

## 2025-06-18 PROCEDURE — 04FD3ZZ FRAGMENTATION OF LEFT COMMON ILIAC ARTERY, PERCUTANEOUS APPROACH: ICD-10-PCS | Performed by: SURGERY

## 2025-06-18 PROCEDURE — C1769 GUIDE WIRE: HCPCS | Performed by: SURGERY

## 2025-06-18 PROCEDURE — 700105 HCHG RX REV CODE 258: Performed by: SURGERY

## 2025-06-18 PROCEDURE — 700101 HCHG RX REV CODE 250: Performed by: SURGERY

## 2025-06-18 PROCEDURE — 160002 HCHG RECOVERY MINUTES (STAT): Performed by: SURGERY

## 2025-06-18 PROCEDURE — C1713 ANCHOR/SCREW BN/BN,TIS/BN: HCPCS | Performed by: SURGERY

## 2025-06-18 DEVICE — IMPLANTABLE DEVICE: Type: IMPLANTABLE DEVICE | Site: GROIN | Status: FUNCTIONAL

## 2025-06-18 DEVICE — DEVICE CLOSER STARCLOSE SE - (10/BX): Type: IMPLANTABLE DEVICE | Site: GROIN | Status: FUNCTIONAL

## 2025-06-18 RX ORDER — HYDRALAZINE HYDROCHLORIDE 20 MG/ML
10 INJECTION INTRAMUSCULAR; INTRAVENOUS EVERY 4 HOURS PRN
Status: DISCONTINUED | OUTPATIENT
Start: 2025-06-18 | End: 2025-06-20 | Stop reason: HOSPADM

## 2025-06-18 RX ORDER — CLOPIDOGREL 300 MG/1
300 TABLET, FILM COATED ORAL ONCE
Status: COMPLETED | OUTPATIENT
Start: 2025-06-18 | End: 2025-06-18

## 2025-06-18 RX ORDER — LABETALOL HYDROCHLORIDE 5 MG/ML
10 INJECTION, SOLUTION INTRAVENOUS EVERY 4 HOURS PRN
Status: DISCONTINUED | OUTPATIENT
Start: 2025-06-18 | End: 2025-06-20 | Stop reason: HOSPADM

## 2025-06-18 RX ORDER — GABAPENTIN 100 MG/1
100 CAPSULE ORAL 3 TIMES DAILY
Status: DISCONTINUED | OUTPATIENT
Start: 2025-06-18 | End: 2025-06-19

## 2025-06-18 RX ORDER — SODIUM CHLORIDE 9 MG/ML
500 INJECTION, SOLUTION INTRAVENOUS
Status: DISCONTINUED | OUTPATIENT
Start: 2025-06-18 | End: 2025-06-18 | Stop reason: HOSPADM

## 2025-06-18 RX ORDER — NICOTINE 21 MG/24HR
21 PATCH, TRANSDERMAL 24 HOURS TRANSDERMAL
Status: DISCONTINUED | OUTPATIENT
Start: 2025-06-18 | End: 2025-06-20 | Stop reason: HOSPADM

## 2025-06-18 RX ORDER — MORPHINE SULFATE 4 MG/ML
2 INJECTION INTRAVENOUS ONCE
Refills: 0 | Status: COMPLETED | OUTPATIENT
Start: 2025-06-18 | End: 2025-06-18

## 2025-06-18 RX ORDER — HYDROCODONE BITARTRATE AND ACETAMINOPHEN 5; 325 MG/1; MG/1
1-2 TABLET ORAL EVERY 4 HOURS PRN
Refills: 0 | Status: DISCONTINUED | OUTPATIENT
Start: 2025-06-18 | End: 2025-06-20 | Stop reason: HOSPADM

## 2025-06-18 RX ORDER — SODIUM CHLORIDE, SODIUM LACTATE, POTASSIUM CHLORIDE, CALCIUM CHLORIDE 600; 310; 30; 20 MG/100ML; MG/100ML; MG/100ML; MG/100ML
INJECTION, SOLUTION INTRAVENOUS CONTINUOUS
Status: DISCONTINUED | OUTPATIENT
Start: 2025-06-18 | End: 2025-06-19

## 2025-06-18 RX ORDER — HEPARIN SODIUM,PORCINE 1000/ML
VIAL (ML) INJECTION
Status: DISCONTINUED | OUTPATIENT
Start: 2025-06-18 | End: 2025-06-18 | Stop reason: HOSPADM

## 2025-06-18 RX ORDER — MIDAZOLAM HYDROCHLORIDE 1 MG/ML
.5-2 INJECTION INTRAMUSCULAR; INTRAVENOUS PRN
Status: DISCONTINUED | OUTPATIENT
Start: 2025-06-18 | End: 2025-06-18 | Stop reason: HOSPADM

## 2025-06-18 RX ORDER — ASPIRIN 81 MG/1
81 TABLET ORAL DAILY
Status: DISCONTINUED | OUTPATIENT
Start: 2025-06-18 | End: 2025-06-20 | Stop reason: HOSPADM

## 2025-06-18 RX ORDER — HEPARIN SODIUM,PORCINE 1000/ML
5000 VIAL (ML) INJECTION ONCE
Status: COMPLETED | OUTPATIENT
Start: 2025-06-18 | End: 2025-06-18

## 2025-06-18 RX ORDER — ONDANSETRON 2 MG/ML
4 INJECTION INTRAMUSCULAR; INTRAVENOUS PRN
Status: DISCONTINUED | OUTPATIENT
Start: 2025-06-18 | End: 2025-06-18 | Stop reason: HOSPADM

## 2025-06-18 RX ORDER — CLOPIDOGREL BISULFATE 75 MG/1
75 TABLET ORAL DAILY
Status: DISCONTINUED | OUTPATIENT
Start: 2025-06-19 | End: 2025-06-20 | Stop reason: HOSPADM

## 2025-06-18 RX ORDER — IODIXANOL 270 MG/ML
INJECTION, SOLUTION INTRAVASCULAR
Status: DISCONTINUED | OUTPATIENT
Start: 2025-06-18 | End: 2025-06-18 | Stop reason: HOSPADM

## 2025-06-18 RX ADMIN — FENTANYL CITRATE 50 MCG: 50 INJECTION, SOLUTION INTRAMUSCULAR; INTRAVENOUS at 12:37

## 2025-06-18 RX ADMIN — HYDRALAZINE HYDROCHLORIDE 25 MG: 25 TABLET ORAL at 22:48

## 2025-06-18 RX ADMIN — HYDRALAZINE HYDROCHLORIDE 10 MG: 20 INJECTION INTRAMUSCULAR; INTRAVENOUS at 15:02

## 2025-06-18 RX ADMIN — AMLODIPINE BESYLATE 5 MG: 5 TABLET ORAL at 05:13

## 2025-06-18 RX ADMIN — MORPHINE SULFATE 2 MG: 4 INJECTION INTRAVENOUS at 14:44

## 2025-06-18 RX ADMIN — ATORVASTATIN CALCIUM 80 MG: 40 TABLET, FILM COATED ORAL at 22:48

## 2025-06-18 RX ADMIN — CLOPIDOGREL BISULFATE 300 MG: 300 TABLET, FILM COATED ORAL at 14:36

## 2025-06-18 RX ADMIN — GABAPENTIN 100 MG: 100 CAPSULE ORAL at 17:45

## 2025-06-18 RX ADMIN — HEPARIN SODIUM 5000 UNITS: 1000 INJECTION, SOLUTION INTRAVENOUS; SUBCUTANEOUS at 12:48

## 2025-06-18 RX ADMIN — HYDRALAZINE HYDROCHLORIDE 25 MG: 25 TABLET ORAL at 05:13

## 2025-06-18 RX ADMIN — CEFAZOLIN 2 G: 2 INJECTION, POWDER, FOR SOLUTION INTRAMUSCULAR; INTRAVENOUS at 12:35

## 2025-06-18 RX ADMIN — HYDROCODONE BITARTRATE AND ACETAMINOPHEN 2 TABLET: 5; 325 TABLET ORAL at 17:46

## 2025-06-18 RX ADMIN — ASPIRIN 81 MG: 81 TABLET, COATED ORAL at 14:36

## 2025-06-18 RX ADMIN — SODIUM CHLORIDE, POTASSIUM CHLORIDE, SODIUM LACTATE AND CALCIUM CHLORIDE: 600; 310; 30; 20 INJECTION, SOLUTION INTRAVENOUS at 17:52

## 2025-06-18 RX ADMIN — MIDAZOLAM HYDROCHLORIDE 1 MG: 1 INJECTION, SOLUTION INTRAMUSCULAR; INTRAVENOUS at 12:37

## 2025-06-18 ASSESSMENT — PAIN DESCRIPTION - PAIN TYPE
TYPE: ACUTE PAIN
TYPE: SURGICAL PAIN
TYPE: ACUTE PAIN
TYPE: SURGICAL PAIN
TYPE: CHRONIC PAIN;ACUTE PAIN

## 2025-06-18 NOTE — OR SURGEON
Immediate Post OP Note    PreOp Diagnosis: Left toe ischemia with early gangrenous changes.    PostOp Diagnosis: Same.      Procedure(s):  1) Abdominal aortogram.  2) Intravascular lithotripsy of left common iliac and external iliac arteries using 8 mm Shockwave balloon.  3) Left external iliac artery stenting using 8 x 80 mm Yuli drug-eluting stent.  4) Bilateral leg angiogram  - Wound Class: Clean    Surgeon(s):  Gordy Rodrigues M.D.    Type of Anesthesia: IV sedation for 1 hour and local anesthesia with 10 mL 1% lidocaine solution.    Surgical Staff:  Circulator: Kei Giordano R.N.  Relief Circulator: Lynette Stevens R.N.  Scrub Person: Randy Mullins  Sedation/Monitoring Nurse: Norma Daley R.N.  Radiology Technologist: Akilah Long    Specimens removed if any:  * No specimens in log *    Estimated Blood Loss: 10 mL.    Contrast used: 61 mL Visipaque.    Findings: Functionally occluded left iliac arterial system, successfully treated with intravascular lithotripsy and stenting.  Small left lower extremity arterial system.    Complications: None.    Dictated, #[id number].        6/18/2025 1:49 PM Gordy Rodrigues M.D.

## 2025-06-18 NOTE — PROGRESS NOTES
Virtual Nurse portion of admission profile and rounding complete.    Round Needs: Other: Patient designated a caregiver. Patient requesting warm blankets and Notified of Patient Needs: Unit clerk, ACT and CNA

## 2025-06-18 NOTE — PROGRESS NOTES
4 Eyes Skin Assessment Completed by ELINOR Chávez and JOSE Larsen.    Skin assessment is primarily focused on high risk bony prominences. Pay special attention to skin beneath and around medical devices, high risk bony prominences, skin to skin areas and areas where the patient lacks sensation to feel pain and areas where the patient previously had breakdown.     Head (Occipital):  WDL   Ears (Under Medical Devices): WDL   Nose (Under Medical Devices): WDL   Mouth:  WDL   Neck: WDL   Breast/Chest:  WDL   Shoulder Blades:  WDL   Spine:   WDL   (R) Arm/Elbow/Hand: WDL   (L) Arm/Elbow/Hand: WDL, Red, and Blanching (sun burn per pt)   Abdomen: WDL and Scar   Pannus/Groin:  WDL   Sacrum/Coccyx:   WDL, Pink, and Blanching   (R) Ischial Tuberosity (Sit Bones):  WDL, Pink, and Blanching   (L) Ischial Tuberosity (Sit Bones):  WDL, Pink, and Blanching   (R) Leg:  Red and Blanching (sun burn per pt)   (L) Leg:  Red and Blanching (sun burn per pt)   (R) Heel:  Red and Blanching   (R) Foot/Toe: WDL   (L) Heel: Red and Blanching   (L) Foot/Toe:  Purple/maroon and Blanching       DEVICES IN USE:   Respiratory Devices:  Nasal cannula  Feeding Devices:  N/A   Lines & BP Monitoring Devices:  Peripheral IV, BP cuff, and Pulse ox    Orthopedic Devices:  N/A  Miscellaneous Devices:  N/A    PROTOCOL INTERVENTIONS:   Standard/Trauma Bed:  Already in place  Float Heels with Pillows:  Applied this assessment  Nasal Cannula with Gray Foams:  Applied this assessment    WOUND PHOTOS:   Completed and in EPIC     WOUND CONSULT:   N/A, no advanced wound care needs identified

## 2025-06-18 NOTE — OR NURSING
1352: Pt arrived via bed with RN x2. Report received. See flowsheet for VS. Per report, Dr. Rodrigues aware of bradycardia. Left femoral sheath site CDI, soft. Left pedal pulse heard by doppler. Orders reviewed and initiated.   1414: Dr. Rodrigues notified of pain. Orders received.   1528: Dr. Rodrigues updated on uncontrolled pain in left leg.   1553: Dr. Rodrigues at bedside.   1614: Report called to ELINOR Linares. All questions answered. VSS. Left femoral sheath site CDI, soft. Left femoral pulse heard by doppler. Pt transported to room in stable condition, flat per bedrest order, on 2L NC. Thalia adriáner and moonboot in place. Family updated.

## 2025-06-18 NOTE — RESPIRATORY CARE
" ASTHMA EDUCATION by COPD CLINICAL EDUCATOR  6/18/2025 at 9:28 AM by Trinidad Samuels RRT     Patient reviewed by COPD education team. Patient does not have a history or diagnosis of COPD and is a smoker.  Therefore, patient does not qualify for the COPD program.    Patient and  at bedside for education. Patient denies COPD but admits to asthma. Patient was given a spacer with instruction.     Patient requesting a nicotine patch which was done. Patient declined smoking cessation.        COPD Assessment  COPD Clinical Specialists ONLY  Is this a COPD exacerbation patient?: No  DME Company: none  DME Equipment Type: none  Physician Name: Isaias Mccray D.O.  Pulmonologist Name: none  Referrals Initiated: Yes  Pulmonary Rehab: No  Smoking Cessation: Declined  Hospice: No  Home Health Care: No  Mobile Urgent Care Services: No  Geriatric Specialty Group: No  Private In-Home Care Agency: No  $ Demo/Eval of SVN's, MDI's and Aerosols: Yes  Interdisciplinary Rounds: Attendance at Rounds (30 Min)    PFT Results    No results found for: \"PFT\"    Meds to Beds  [facility] provides bedside medication delivery for all eligible patients at discharge and you have been automatically enrolled in the Meds to Beds Program. Would you like to opt out of this program for any reason?: No - Stay Opted In     MY ASTHMA ACTION PLAN     It is recommended that patients and physicians/healthcare providers complete this action plan together. This plan should be discussed at each physician visit and updated as needed.    The green, yellow and red zones show groups of symptoms of ASTHMA. This list of symptoms is not comprehensive, and you may experience other symptoms. In the \"Actions\" column, your healthcare provider has recommended actions for you to take based on your symptoms.    Patient Name: Rubi Urbina   YOB: 1960   Last Updated on: 6/18/2025  9:28 AM   Green Zone:  I am doing well today Actions     Usual activitiy " "and exercise level   Take daily medications     Usual amounts of cough and phlegm/mucus   Use oxygen as prescribed     Sleep well at night   Continue regular exercise/diet plan     Appetite is good   At all times avoid cigarette smoke, inhaled irritants     Daily Medications (these medications are taken every day):                Yellow Zone:  I am having a bad day or an ASTHMA flare Actions     More breathless than usual   Continue daily medications     I have less energy for my daily activities   Use quick relief inhaler as ordered     Increased or thicker phlegm/mucus   Use oxygen as prescribed     Using quick relief inhaler/nebulizer more often   Get plenty of rest     Swelling of ankles more than usual   Use pursed lip breathing     More coughing than usual   At all times avoid cigarette smoke, inhaled irritants     I feel like I have a \"chest cold\"     Poor sleep and my symptoms woke me up     My appetite is not good     My medicine is not helping      Call provider immediately if symptoms don’t improve     Continue daily medications, add rescue medications:   Albuterol 2 Puffs Every 4 hours PRN       Medications to be used during a flare up, (as Discussed with Provider):           Additional Information:  Use a spacer that was provided and wait 1-2 minutes in between puffs    Red Zone:  I need urgent medical care Actions     Severe shortness of breath even at rest   Call 911 or seek medical care immediately     Not able to do any activity because of breathing      Fever or shaking chills      Feeling confused or very drowsy       Chest pains      Coughing up blood                  "

## 2025-06-18 NOTE — PROGRESS NOTES
"Vascular surgery.    Contacted by RN for patient having \"burning\" pain in the left foot.    On physical exam, the left foot is warm with brisk Doppler flow signals over the posterior tibial artery.  Compartments are soft.  Chronic cyanosis of the left toes.    Patient appears to have improved circulation to the left foot following the intervention.  She seems to experience nerve pain.    Plan:  Neurontin 100 mg 3 times daily.  Norco as needed.    Discussed with patient and RN.      "

## 2025-06-18 NOTE — PROGRESS NOTES
Vascular surgery.    Seen and examined.  Patient had ischemic left toes.  CTA results were reviewed with patient.  I discussed with patient the option of undergoing angiogram with possible endovascular intervention if feasible and appropriate to improve circulation to her left foot.  Risks of procedure were discussed which include, but not limited to, bleeding, infection, contrast reaction, contrast-induced renal failure, atheroembolization, and perioperative anesthetic complications.  Patient indicated understanding would like to proceed.  All questions were answered.    I strongly advised patient to stop smoking to prevent progression of disease and stent thrombosis.  Patient indicated understanding.

## 2025-06-18 NOTE — PROGRESS NOTES
Bedside report received.  Assessment complete.    A&O x 4. Patient calls appropriately.  Patient ambulates with x1-x2 assist, hand held. Bed alarm on.   Patient has 3/10 pain. Patient medicated per MAR.  Denies N&V. Tolerating regular diet. Patient to be NPO at midnight.  + void, + flatus, + BM, last BM 6/17 PTA per patient.  Patient denies SOB.  SCD's on.    Review plan with of care with patient. Call light and personal belongings within reach. Hourly rounding in place. All needs met at this time.

## 2025-06-18 NOTE — OP REPORT
DATE OF SERVICE:  06/18/2025     SURGEON:  Gordy Rodrigues MD     ANESTHESIA:  Intravenous sedation with fentanyl and Versed for 1 hour, local   anesthesia with 10 mL of 1% lidocaine solution.     PREOPERATIVE DIAGNOSIS:  Left toe ischemia with early gangrenous changes.     POSTOPERATIVE DIAGNOSIS:  Left toe ischemia with early gangrenous changes.     PROCEDURES:  1.  Percutaneous cannulation of left common femoral artery under ultrasound   guidance.  2.  Catheter, abdominal aorta.  3.  Abdominal aortogram.  4.  Bilateral lower extremity angiogram.  5.  Left common iliac and external iliac artery intravascular lithotripsy   using an 8-mm Shockwave balloon.  6.  Left external iliac artery stenting using 8x80 mm Yuli drug-eluting   stent.     INDICATIONS FOR PROCEDURE:  This is a 64-year-old female with multiple medical   problems including tobacco abuse, who presented with left lower ischemia with   early gangrenous changes in left toes.  CTA was performed and was suggestive of iliac   occlusive disease as well as tibioperoneal disease.  Discussion was made with   the patient.  She would like to undergo angiogram with possible endovascular   intervention if feasible and appropriate to improve circulation to her left   foot, fully understanding all risks.     DESCRIPTION OF PROCEDURE:  Informed consent was obtained.  The patient was   taken to the endovascular suite and was placed in the supine position.  She   was given Ancef intravenously.  A time-out procedure was performed.    Intravenous sedation was performed with fentanyl and Versed.  The patient was   closely monitored.     Next, both of her groins were sterilely prepped and draped in the normal   fashion.  Duplex evaluation of the left common femoral artery was performed.    It was found to be patent with mild plaque formation; however, it was not   pulsatile at all.  Under ultrasound guidance, the left common femoral artery   was percutaneously  cannulated using an access needle after the skin and   subcutaneous tissue were anesthetized with 1% lidocaine solution.  A   guidewire was passed through the needle.  The needle was removed and replaced   with a 5-Uzbek sheath.  Next, the occluded left external iliac arterial   system was successfully crossed.  Over the guidewire, an Omniflush catheter   was advanced into the abdominal aorta and positioned at L2 vertebral level.    An abdominal aortogram was performed.     Next, the catheter was retracted down to the aortic bifurcation.    Bilateral lower extremity angiogram was obtained.     Next, the patient was given heparin 5000 units intravenously.  After the   heparin was allowed to circulate systemically for 3 minutes, over the   guidewire, intravascular lithotripsy of the left common iliac as well as   external iliac artery was performed using an 8-mm Shockwave balloon.  Followup   angiogram was obtained and showed recoiled stenosis in the external iliac   artery and therefore, the external iliac artery was stented using an 8x80 mm   Yuli drug-eluting stent.  A followup angiogram was obtained and showed   complete restoration of luminal patency with preservation of distal flow.     The 7-Uzbek sheath was removed and a StarClose was deployed with excellent   hemostasis achieved.  Sterile dressing was applied.     IMPRESSION:  1.  Patent infrarenal abdominal aorta and right iliac arterial system.  2.  The left common iliac and external iliac arteries were functionally occluded.    Following intravascular lithotripsy and stenting, there was complete   restoration of luminal patency.  3.  Patent left common femoral, profunda femoral, and superficial femoral   arteries.  The arterial system is small.  The distal circulation was not   evaluated in order to minimize the amount of contrast use.  4.  Patent right common femoral, profunda femoral, and proximal superficial femoral   arteries.  The distal  circulation of the right leg was also not evaluated in   order to minimize the amount of contrast used.     ESTIMATED BLOOD LOSS:  10 mL.     CONTRAST USED:  61 mL Visipaque.     COUNTS:  Sponge and instrument counts were correct x2.     The patient was then awakened and taken to the recovery area in stable   condition with brisk Doppler flow signals obtained over the left posterior tibial artery.        ______________________________  MD CHAZ Hunt/AZM    DD:  06/18/2025 13:59  DT:  06/18/2025 14:23    Job#:  [id number]

## 2025-06-18 NOTE — CARE PLAN
The patient is Stable - Low risk of patient condition declining or worsening    Problem: Knowledge Deficit - Standard  Goal: Patient and family/care givers will demonstrate understanding of plan of care, disease process/condition, diagnostic tests and medications  Outcome: Progressing     Problem: Pain - Standard  Goal: Alleviation of pain or a reduction in pain to the patient’s comfort goal  Outcome: Progressing     Problem: Skin Integrity  Goal: Skin integrity is maintained or improved  Outcome: Progressing     Shift Goals  Clinical Goals: pain control, heparin gtt, NPO at midnight, rest, comfort  Patient Goals: rest, comfort    Progress made toward(s) clinical / shift goals:  Patient's pain managed per MAR. Heparin gtt monitored throughout this shift. Patient NPO at midnight. Patient able to rest during this shift.    Patient is not progressing towards the following goals:

## 2025-06-18 NOTE — PROGRESS NOTES
The patient arrived to [facility] from ED. Left pedal pulse 1+, cold to touch, cap refill >3 seconds, and toes purple in color. Heparin drip infusing at 18 units. IV intact. No further needs at this time.

## 2025-06-18 NOTE — ED NOTES
Pt resting on gurney in nad, POC discussed, pending bed cleaning upstairs, call light within reach

## 2025-06-19 LAB
ALBUMIN SERPL BCP-MCNC: 3.6 G/DL (ref 3.2–4.9)
ALBUMIN/GLOB SERPL: 1.3 G/DL
ALP SERPL-CCNC: 82 U/L (ref 30–99)
ALT SERPL-CCNC: 11 U/L (ref 2–50)
ANION GAP SERPL CALC-SCNC: 13 MMOL/L (ref 7–16)
AST SERPL-CCNC: 19 U/L (ref 12–45)
BASOPHILS # BLD AUTO: 0.2 % (ref 0–1.8)
BASOPHILS # BLD: 0.02 K/UL (ref 0–0.12)
BILIRUB SERPL-MCNC: 0.4 MG/DL (ref 0.1–1.5)
BUN SERPL-MCNC: 15 MG/DL (ref 8–22)
CALCIUM ALBUM COR SERPL-MCNC: 8.9 MG/DL (ref 8.5–10.5)
CALCIUM SERPL-MCNC: 8.6 MG/DL (ref 8.5–10.5)
CHLORIDE SERPL-SCNC: 104 MMOL/L (ref 96–112)
CO2 SERPL-SCNC: 21 MMOL/L (ref 20–33)
CREAT SERPL-MCNC: 0.66 MG/DL (ref 0.5–1.4)
EOSINOPHIL # BLD AUTO: 0.01 K/UL (ref 0–0.51)
EOSINOPHIL NFR BLD: 0.1 % (ref 0–6.9)
ERYTHROCYTE [DISTWIDTH] IN BLOOD BY AUTOMATED COUNT: 45.1 FL (ref 35.9–50)
GFR SERPLBLD CREATININE-BSD FMLA CKD-EPI: 98 ML/MIN/1.73 M 2
GLOBULIN SER CALC-MCNC: 2.7 G/DL (ref 1.9–3.5)
GLUCOSE SERPL-MCNC: 88 MG/DL (ref 65–99)
HCT VFR BLD AUTO: 44 % (ref 37–47)
HGB BLD-MCNC: 14.3 G/DL (ref 12–16)
IMM GRANULOCYTES # BLD AUTO: 0.04 K/UL (ref 0–0.11)
IMM GRANULOCYTES NFR BLD AUTO: 0.5 % (ref 0–0.9)
LYMPHOCYTES # BLD AUTO: 1.67 K/UL (ref 1–4.8)
LYMPHOCYTES NFR BLD: 19.5 % (ref 22–41)
MAGNESIUM SERPL-MCNC: 1.8 MG/DL (ref 1.5–2.5)
MCH RBC QN AUTO: 28.7 PG (ref 27–33)
MCHC RBC AUTO-ENTMCNC: 32.5 G/DL (ref 32.2–35.5)
MCV RBC AUTO: 88.2 FL (ref 81.4–97.8)
MONOCYTES # BLD AUTO: 0.79 K/UL (ref 0–0.85)
MONOCYTES NFR BLD AUTO: 9.2 % (ref 0–13.4)
NEUTROPHILS # BLD AUTO: 6.04 K/UL (ref 1.82–7.42)
NEUTROPHILS NFR BLD: 70.5 % (ref 44–72)
NRBC # BLD AUTO: 0 K/UL
NRBC BLD-RTO: 0 /100 WBC (ref 0–0.2)
PHOSPHATE SERPL-MCNC: 4 MG/DL (ref 2.5–4.5)
PLATELET # BLD AUTO: 220 K/UL (ref 164–446)
PMV BLD AUTO: 8.7 FL (ref 9–12.9)
POTASSIUM SERPL-SCNC: 3.9 MMOL/L (ref 3.6–5.5)
PROT SERPL-MCNC: 6.3 G/DL (ref 6–8.2)
RBC # BLD AUTO: 4.99 M/UL (ref 4.2–5.4)
SODIUM SERPL-SCNC: 138 MMOL/L (ref 135–145)
WBC # BLD AUTO: 8.6 K/UL (ref 4.8–10.8)

## 2025-06-19 PROCEDURE — 700102 HCHG RX REV CODE 250 W/ 637 OVERRIDE(OP): Performed by: STUDENT IN AN ORGANIZED HEALTH CARE EDUCATION/TRAINING PROGRAM

## 2025-06-19 PROCEDURE — A9270 NON-COVERED ITEM OR SERVICE: HCPCS | Performed by: SURGERY

## 2025-06-19 PROCEDURE — A9270 NON-COVERED ITEM OR SERVICE: HCPCS | Performed by: STUDENT IN AN ORGANIZED HEALTH CARE EDUCATION/TRAINING PROGRAM

## 2025-06-19 PROCEDURE — 85025 COMPLETE CBC W/AUTO DIFF WBC: CPT

## 2025-06-19 PROCEDURE — 83735 ASSAY OF MAGNESIUM: CPT

## 2025-06-19 PROCEDURE — 770020 HCHG ROOM/CARE - TELE (206)

## 2025-06-19 PROCEDURE — 700102 HCHG RX REV CODE 250 W/ 637 OVERRIDE(OP): Performed by: SURGERY

## 2025-06-19 PROCEDURE — 84100 ASSAY OF PHOSPHORUS: CPT

## 2025-06-19 PROCEDURE — 80053 COMPREHEN METABOLIC PANEL: CPT

## 2025-06-19 PROCEDURE — 99233 SBSQ HOSP IP/OBS HIGH 50: CPT | Performed by: STUDENT IN AN ORGANIZED HEALTH CARE EDUCATION/TRAINING PROGRAM

## 2025-06-19 PROCEDURE — 700102 HCHG RX REV CODE 250 W/ 637 OVERRIDE(OP): Performed by: INTERNAL MEDICINE

## 2025-06-19 PROCEDURE — 700105 HCHG RX REV CODE 258: Performed by: SURGERY

## 2025-06-19 PROCEDURE — A9270 NON-COVERED ITEM OR SERVICE: HCPCS | Performed by: INTERNAL MEDICINE

## 2025-06-19 RX ORDER — GABAPENTIN 100 MG/1
200 CAPSULE ORAL 3 TIMES DAILY
Status: DISCONTINUED | OUTPATIENT
Start: 2025-06-19 | End: 2025-06-20 | Stop reason: HOSPADM

## 2025-06-19 RX ADMIN — GABAPENTIN 100 MG: 100 CAPSULE ORAL at 05:09

## 2025-06-19 RX ADMIN — ACETAMINOPHEN 650 MG: 325 TABLET ORAL at 02:41

## 2025-06-19 RX ADMIN — LOSARTAN POTASSIUM 100 MG: 50 TABLET, FILM COATED ORAL at 05:08

## 2025-06-19 RX ADMIN — ATORVASTATIN CALCIUM 80 MG: 40 TABLET, FILM COATED ORAL at 21:11

## 2025-06-19 RX ADMIN — DOCUSATE SODIUM 50 MG AND SENNOSIDES 8.6 MG 2 TABLET: 8.6; 5 TABLET, FILM COATED ORAL at 16:45

## 2025-06-19 RX ADMIN — CLOPIDOGREL BISULFATE 75 MG: 75 TABLET, FILM COATED ORAL at 05:10

## 2025-06-19 RX ADMIN — GABAPENTIN 200 MG: 100 CAPSULE ORAL at 16:45

## 2025-06-19 RX ADMIN — HYDRALAZINE HYDROCHLORIDE 25 MG: 25 TABLET ORAL at 14:11

## 2025-06-19 RX ADMIN — AMLODIPINE BESYLATE 5 MG: 5 TABLET ORAL at 05:08

## 2025-06-19 RX ADMIN — HYDROCODONE BITARTRATE AND ACETAMINOPHEN 1 TABLET: 5; 325 TABLET ORAL at 21:10

## 2025-06-19 RX ADMIN — GABAPENTIN 200 MG: 100 CAPSULE ORAL at 12:00

## 2025-06-19 RX ADMIN — HYDRALAZINE HYDROCHLORIDE 25 MG: 25 TABLET ORAL at 21:11

## 2025-06-19 RX ADMIN — ACETAMINOPHEN 650 MG: 325 TABLET ORAL at 09:43

## 2025-06-19 RX ADMIN — SODIUM CHLORIDE, POTASSIUM CHLORIDE, SODIUM LACTATE AND CALCIUM CHLORIDE: 600; 310; 30; 20 INJECTION, SOLUTION INTRAVENOUS at 03:59

## 2025-06-19 RX ADMIN — ASPIRIN 81 MG: 81 TABLET, COATED ORAL at 05:10

## 2025-06-19 RX ADMIN — NICOTINE TRANSDERMAL SYSTEM 21 MG: 21 PATCH, EXTENDED RELEASE TRANSDERMAL at 05:08

## 2025-06-19 RX ADMIN — HYDRALAZINE HYDROCHLORIDE 25 MG: 25 TABLET ORAL at 05:08

## 2025-06-19 ASSESSMENT — COGNITIVE AND FUNCTIONAL STATUS - GENERAL
MOBILITY SCORE: 18
MOVING TO AND FROM BED TO CHAIR: A LITTLE
EATING MEALS: A LITTLE
HELP NEEDED FOR BATHING: A LITTLE
CLIMB 3 TO 5 STEPS WITH RAILING: A LITTLE
DRESSING REGULAR UPPER BODY CLOTHING: A LITTLE
DRESSING REGULAR LOWER BODY CLOTHING: A LITTLE
DAILY ACTIVITIY SCORE: 18
TURNING FROM BACK TO SIDE WHILE IN FLAT BAD: A LITTLE
WALKING IN HOSPITAL ROOM: A LITTLE
SUGGESTED CMS G CODE MODIFIER MOBILITY: CK
STANDING UP FROM CHAIR USING ARMS: A LITTLE
MOVING FROM LYING ON BACK TO SITTING ON SIDE OF FLAT BED: A LITTLE
SUGGESTED CMS G CODE MODIFIER DAILY ACTIVITY: CK
PERSONAL GROOMING: A LITTLE
TOILETING: A LITTLE

## 2025-06-19 ASSESSMENT — PAIN DESCRIPTION - PAIN TYPE
TYPE: ACUTE PAIN
TYPE: SURGICAL PAIN
TYPE: ACUTE PAIN

## 2025-06-19 NOTE — CARE PLAN
Problem: Knowledge Deficit - Standard  Goal: Patient and family/care givers will demonstrate understanding of plan of care, disease process/condition, diagnostic tests and medications  Outcome: Progressing     Problem: Pain - Standard  Goal: Alleviation of pain or a reduction in pain to the patient’s comfort goal  Outcome: Progressing     Problem: Skin Integrity  Goal: Skin integrity is maintained or improved  Outcome: Progressing   The patient is Stable - Low risk of patient condition declining or worsening    Shift Goals  Clinical Goals: Pain control, ambulation with staff  Patient Goals: Pain control, rest  Family Goals: No family present at this time    Progress made toward(s) clinical / shift goals:  Pain controlled per MAR, mobilization with staff    Patient is not progressing towards the following goals:

## 2025-06-19 NOTE — CARE PLAN
The patient is Stable - Low risk of patient condition declining or worsening    Problem: Knowledge Deficit - Standard  Goal: Patient and family/care givers will demonstrate understanding of plan of care, disease process/condition, diagnostic tests and medications  Outcome: Progressing     Problem: Pain - Standard  Goal: Alleviation of pain or a reduction in pain to the patient’s comfort goal  Outcome: Progressing     Problem: Skin Integrity  Goal: Skin integrity is maintained or improved  Outcome: Progressing     Shift Goals  Clinical Goals: pain control, Q4 CMS checks, rest, comfort  Patient Goals: rest, comfort    Progress made toward(s) clinical / shift goals:  Patient's pain managed per MAR. CMS checks completed throughout this shift. Patient able to void post surgery. Patient able to rest during this shift.     Patient is not progressing towards the following goals:

## 2025-06-19 NOTE — PROGRESS NOTES
4 Eyes Skin Assessment Completed by ELINOR Chávez and JOSE Larsen.    Skin assessment is primarily focused on high risk bony prominences. Pay special attention to skin beneath and around medical devices, high risk bony prominences, skin to skin areas and areas where the patient lacks sensation to feel pain and areas where the patient previously had breakdown.     Head (Occipital):  WDL   Ears (Under Medical Devices): WDL   Nose (Under Medical Devices): WDL   Mouth:  WDL   Neck: WDL   Breast/Chest:  WDL   Shoulder Blades:  WDL   Spine:   WDL   (R) Arm/Elbow/Hand: WDL   (L) Arm/Elbow/Hand: WDL, Red, and Blanching   Abdomen: WDL and Scar   Pannus/Groin:  WDL and Incision to left groin, dressing in place, CDI   Sacrum/Coccyx:   WDL, Pink, and Blanching   (R) Ischial Tuberosity (Sit Bones):  WDL, Pink, and Blanching   (L) Ischial Tuberosity (Sit Bones):  Red and Blanching   (R) Leg:  Red and Blanching   (L) Leg:  Pink and Blanching   (R) Heel:  WDL, Pink, and Blanching   (R) Foot/Toe: Pink and Blanching   (L) Heel: Pink, Red, and Blanching   (L) Foot/Toe:  Pink, Purple/maroon, Light Purple, and Blanching       DEVICES IN USE:   Respiratory Devices:  Nasal cannula  Feeding Devices:  N/A   Lines & BP Monitoring Devices:  Peripheral IV, BP cuff, and Pulse ox    Orthopedic Devices:  N/A  Miscellaneous Devices:  N/A    PROTOCOL INTERVENTIONS:   Standard/Trauma Bed:  Already in place  Heel Float Boots:  Already in place  Silicone Nasal Cannula Tubing:  Already in place  Thalia hugger:  Already in place    WOUND PHOTOS:   N/A no wounds identified    WOUND CONSULT:   N/A, no advanced wound care needs identified

## 2025-06-19 NOTE — PROGRESS NOTES
"                 VASCULAR SURGERY               Inpatient Progress Note  _________________________________    Vitals   /59   Pulse 91   Temp 36.9 °C (98.4 °F) (Temporal)   Resp 18   Ht 1.575 m (5' 2\")   Wt 66.5 kg (146 lb 9.7 oz)   LMP  (LMP Unknown)   SpO2 93%   BMI 26.81 kg/m²   _________________________________    6/19/2025  Status post left lower extremity angiogram with left external iliac stenting  She is doing quite well today.  Reports much improvement in her left foot pain and numbness.  Slight improvement of the left toe discoloration    Exam:  Pleasant female, no acute distress  Groins clean and dry, no hematoma  Left foot warm, well-perfused.  Stable blue discoloration to the toes, warm.  Biphasic Doppler signal to PT  Right foot warm, well-perfused.  Palpable pedal pulses  Moving all extremities well  Compartments soft      A/P:  PAD  Left toe ischemia  Status post iliac stenting    Ms. Jackson is doing quite well today following her revascularization procedure yesterday.  She remain on aspirin, Plavix and a statin.  We recommend Neurontin for pain associated with revascularization.  Encourage out of bed and ambulation as tolerated.  Dry gauze between the toes and Prevalon boot to be worn when in bed.  Smoking cessation discussed with patient at length today.  She will be stable for discharge home once she is ambulating with minimal assistance.  She will follow-up with Dr. Rodrigues/vascular surgery service in 2 to 3 weeks.    The patient was seen and examined with Dr. Rodrigues who agrees with the above plan.      Ivelisse Richard P.A.-C.  [facility]  [facility] preferred or call my office [phone]  __________________________________________________________________  Patient:Rubi Urbina   MRN:[id number]   CSN:[id number]  "

## 2025-06-19 NOTE — PROGRESS NOTES
[facility] Medicine Daily Progress Note    Date of Service  6/18/2025    Chief Complaint  Rubi Urbina is a 64 y.o. female admitted 6/17/2025 with swelling    Hospital Course  64-year-old female with history of asthma, smoking, dyslipidemia, and seizure who presented 6/17 with pain on the left foot.  Her symptoms started last February with some pain on her left foot and later started having discoloration with sharp pain especially at night, comes and goes, patient noticed dark discoloration on all her toes beginning of May however she did not go to the emergency or PCP.  Patient came on 6/17 with worsening pain.  On admission patient denied any chest pain or shortness of breath and no other symptoms, on exam patient had discoloration on all her toes specially fifth toe on the left leg.  Posterior tibial artery pulse were okay however dorsalis pedis artery pulse was not intact.  ED physician discussed the case with Dr. Rodrigues vascular surgeon and planning for CTA and possible surgery, vascular surgeon did not get any recommendation for anticoagulation until CTA and possible surgery.       Interval Problem Update  6/18  Vitals, afebrile, pulse from  respiratory rate 13-21 systolic blood pressure  pulse ox 93 to 99% on 2 L nasal cannula postop.  CBC hemoglobin 16.7, CMP unremarkable.  Patient went to the operating room with vascular surgery today for bilateral lower extremity angiogram, abdominal aortogram, abdominal aorta catheter, underwent left external iliac artery stenting, tolerated procedure well.  On aspirin and Plavix.  Pain controlled, no new complaints.    I have discussed this patient's plan of care and discharge plan at IDT rounds today with Case Management, Nursing, Nursing leadership, and other members of the IDT team.    Consultants/Specialty  vascular surgery    Code Status  Full Code    Disposition  The patient is not medically cleared for discharge to home or a post-acute  facility.      I have placed the appropriate orders for post-discharge needs.    Review of Systems  ROS   Review of Systems   Constitutional:  Positive for malaise/fatigue. Negative for chills, fever and weight loss.   HENT:  Negative for ear pain, hearing loss and tinnitus.    Eyes:  Negative for blurred vision, double vision and photophobia.   Respiratory:  Negative for cough and hemoptysis.    Cardiovascular:  Negative for chest pain, palpitations, orthopnea and claudication.   Gastrointestinal:  Negative for abdominal pain, constipation, diarrhea, nausea and vomiting.   Genitourinary:  Negative for dysuria, frequency and urgency.   Musculoskeletal:  Negative for myalgias and neck pain.   Skin:  Positive for itching and rash.   Neurological:  Negative for dizziness, speech change and weakness.    Physical Exam  Temp:  [36 °C (96.8 °F)-36.8 °C (98.3 °F)] 36.5 °C (97.7 °F)  Pulse:  [] 100  Resp:  [13-21] 18  BP: ()/(51-83) 114/63  SpO2:  [90 %-99 %] 95 %    Physical Exam  Vitals and nursing note reviewed.   Constitutional:       General: She is not in acute distress.     Appearance: She is not toxic-appearing.   HENT:      Head: Normocephalic and atraumatic.      Nose: Nose normal. No rhinorrhea.      Mouth/Throat:      Mouth: Mucous membranes are moist.      Pharynx: Oropharynx is clear.   Eyes:      General: No scleral icterus.     Extraocular Movements: Extraocular movements intact.      Conjunctiva/sclera: Conjunctivae normal.   Cardiovascular:      Rate and Rhythm: Normal rate and regular rhythm.      Pulses: Normal pulses.   Pulmonary:      Effort: No respiratory distress.      Breath sounds: No wheezing, rhonchi or rales.   Abdominal:      General: There is no distension.      Palpations: Abdomen is soft.      Tenderness: There is no abdominal tenderness. There is no guarding or rebound.   Musculoskeletal:         General: Tenderness present. Normal range of motion.      Cervical back: Normal  range of motion and neck supple. No rigidity.      Comments: Toes 1 and 2 dusky/purple, warm   Skin:     General: Skin is warm and dry.      Capillary Refill: Capillary refill takes less than 2 seconds.      Comments: Left femoral artery access site in the groin dressing intact   Neurological:      General: No focal deficit present.      Mental Status: She is alert and oriented to person, place, and time. Mental status is at baseline.      Cranial Nerves: No cranial nerve deficit.   Psychiatric:         Mood and Affect: Mood normal.         Behavior: Behavior normal.         Thought Content: Thought content normal.         Judgment: Judgment normal.         Fluids    Intake/Output Summary (Last 24 hours) at 6/18/2025 1853  Last data filed at 6/18/2025 0830  Gross per 24 hour   Intake 270 ml   Output 0 ml   Net 270 ml        Laboratory  Recent Labs     06/17/25  1204 06/18/25  0655   WBC 7.2 6.0   RBC 6.45* 5.75*   HEMOGLOBIN 18.6* 16.7*   HEMATOCRIT 57.3* 51.1*   MCV 88.8 88.9   MCH 28.8 29.0   MCHC 32.5 32.7   RDW 45.5 46.0   PLATELETCT 223 183   MPV 8.5* 8.6*     Recent Labs     06/17/25  1204 06/18/25  0655   SODIUM 139 139   POTASSIUM 3.9 4.3   CHLORIDE 102 107   CO2 24 20   GLUCOSE 80 92   BUN 13 15   CREATININE 0.79 0.76   CALCIUM 9.5 8.8     Recent Labs     06/17/25  1257 06/17/25  1611   APTT 27.4 24.9   INR 1.02 1.13         Recent Labs     06/18/25  0655   TRIGLYCERIDE 179*   HDL 48   LDL 79       Imaging  CT-CTA AORTA-RO WITH & W/O-POST PROCESS   Final Result      1.  Probable moderate to severe stenosis of the origin of the left external iliac artery with decreased contrast opacification distally throughout the left lower extremity.   2.  The left internal iliac artery also demonstrates moderate to severe multifocal stenosis near the origin with patency of distal vessels.   3.  Single vessel supply to the left foot provided by the posterior tibial artery.   4.  Large left-sided abdominal wall hernia  containing the splenic flexure of the colon.   5.  Hepatomegaly.         US-EXTREMITY ARTERY LOWER UNILAT LEFT   Final Result      US-RATNA SINGLE LEVEL BILAT   Final Result      DX-FOOT-COMPLETE 3+ LEFT   Final Result      Deformity with associated transversely oriented lucency of the first distal phalanx which could indicate age-indeterminate fracture.      IR-ABDOMINAL AORTA-WITH RUNOFF    (Results Pending)        Assessment/Plan  * Lower limb ischemia- (present on admission)  Assessment & Plan  Likely chronic, months with discoloration in her toes  Came with worsening pain  CTA showed moderate to severe stenosis in the origin of the left iliac artery  Vascular surgeon Dr. Rodrigues was consulted and waiting for further recommendation according to surgery and anticoagulation after CTA  Admit the patient to telemetry  Continue atorvastatin  Start with heparin drip  N.p.o. after midnight      Primary hypertension- (present on admission)  Assessment & Plan  Continue amlodipine and losartan    Seizure disorder (HCC)- (present on admission)  Assessment & Plan  Continue home medication oxcarbazepine  Follow-up with seizure clinic.    Cigarette smoker- (present on admission)  Assessment & Plan  I spent 5 minutes counseling the patient on cessation techniques and resources were offered including nicotine patches, gum, along with medical treatment with wellbutrin and chantix. The patient understands continuing to smoke could lead to complications such as lung disease, heart attack, stroke and death.  The benefits of stopping were also presented to him. The patient verbalized understanding. CPT CODE: 90874 (3-10 minutes tobacco counseling).   Discussed with Sandra about worsening peripheral artery disease and losing her from smoking, she understood.    History of cerebrovascular accident (CVA) with residual deficit- (present on admission)  Assessment & Plan  Left-sided weakness and discoordination  Patient is not on aspirin or  antiplatelets  Continue atorvastatin and consider aspirin         VTE prophylaxis:   SCDs/TEDs      I have performed a physical exam and reviewed and updated ROS and Plan today (6/18/2025). In review of yesterday's note (6/17/2025), there are no changes except as documented above.  Total time spent 53 minutes. I spent greater than 50% of the time for patient care, counseling, and coordination on this date, including unit/floor time, and face-to-face time with the patient as per interval events, my own review of patient's imaging and lab analysis and developing my assessment and plan above.

## 2025-06-19 NOTE — CARE PLAN
The patient is Stable - Low risk of patient condition declining or worsening    Shift Goals  Clinical Goals: pain control, heparin gtt, NPO at midnight, rest, comfort  Patient Goals: rest, comfort    Progress made toward(s) clinical / shift goals:    Problem: Knowledge Deficit - Standard  Goal: Patient and family/care givers will demonstrate understanding of plan of care, disease process/condition, diagnostic tests and medications  Description: Target End Date:  1-3 days or as soon as patient condition allows    Document in Patient Education    1.  Patient and family/caregiver oriented to unit, equipment, visitation policy and means for communicating concern  2.  Complete/review Learning Assessment  3.  Assess knowledge level of disease process/condition, treatment plan, diagnostic tests and medications  4.  Explain disease process/condition, treatment plan, diagnostic tests and medications  Outcome: Progressing     Problem: Pain - Standard  Goal: Alleviation of pain or a reduction in pain to the patient’s comfort goal  Description: Target End Date:  Prior to discharge or change in level of care    Document on Vitals flowsheet    1.  Document pain using the appropriate pain scale per order or unit policy  2.  Educate and implement non-pharmacologic comfort measures (i.e. relaxation, distraction, massage, cold/heat therapy, etc.)  3.  Pain management medications as ordered  4.  Reassess pain after pain med administration per policy  5.  If opiods administered assess patient's response to pain medication is appropriate per POSS sedation scale  6.  Follow pain management plan developed in collaboration with patient and interdisciplinary team (including palliative care or pain specialists if applicable)  Outcome: Progressing

## 2025-06-19 NOTE — PROGRESS NOTES
"Assumed care of patient from night shift RN at 0700.  Patient is alert and oriented times 4, states pain of 3/10, declines intervention at this time.  VSS /59   Pulse 91   Temp 36.9 °C (98.4 °F) (Temporal)   Resp 18   Ht 1.575 m (5' 2\")   Wt 66.5 kg (146 lb 9.7 oz)   LMP  (LMP Unknown)   SpO2 93%   BMI 26.81 kg/m²   Tele monitored  PIV in the RFA, patent and running LR at 100mL/hr  PIV in the LFA, patent, will remove this AM  On RA with saturations in the mid 90s.  Encouraged use of IS, currently pulling 1500.  Last BM 6/17, + flatus.  Bowel protocol in use  Urinating without difficulty.  Cardiac diet, tolerating well.  Toes to the L foot purple, gauze in between each toe.  Warm to the touch.  Incision to the L groin, dressing CDI  Patient is a 1 person hand held assist, demonstrates steady gait, minimal assistance needed.  Previous stroke with L sided deficits.  Low fall risk, bed alarm not in use.  POC discussed for the day, bed is locked and in the lowest position, call light is within reach.  All needs are met at this time, hourly rounding is in place.      "

## 2025-06-19 NOTE — PROGRESS NOTES
Bedside report received.  Assessment complete.     A&O x 4. Patient calls appropriately.  Patient ambulates with x1-x2 assist, hand held. Bed alarm on.   Patient has 4/10 pain. Patient declines any pharmacological interventions at this time. Patient medicated per MAR.  Denies N&V. Tolerating cardiac diet.  - void since surgery, - flatus, - BM, last BM 6/17 PTA per patient.  Patient denies SOB.  SCD's off, bear hugger on.     Review plan with of care with patient. Call light and personal belongings within reach. Hourly rounding in place. All needs met at this time.

## 2025-06-20 ENCOUNTER — PHARMACY VISIT (OUTPATIENT)
Dept: PHARMACY | Facility: MEDICAL CENTER | Age: 65
End: 2025-06-20
Payer: COMMERCIAL

## 2025-06-20 VITALS
SYSTOLIC BLOOD PRESSURE: 135 MMHG | OXYGEN SATURATION: 88 % | BODY MASS INDEX: 26.98 KG/M2 | TEMPERATURE: 98.1 F | DIASTOLIC BLOOD PRESSURE: 63 MMHG | RESPIRATION RATE: 16 BRPM | WEIGHT: 146.61 LBS | HEIGHT: 62 IN | HEART RATE: 72 BPM

## 2025-06-20 PROCEDURE — RXMED WILLOW AMBULATORY MEDICATION CHARGE: Performed by: STUDENT IN AN ORGANIZED HEALTH CARE EDUCATION/TRAINING PROGRAM

## 2025-06-20 PROCEDURE — A9270 NON-COVERED ITEM OR SERVICE: HCPCS | Performed by: INTERNAL MEDICINE

## 2025-06-20 PROCEDURE — 700102 HCHG RX REV CODE 250 W/ 637 OVERRIDE(OP): Performed by: INTERNAL MEDICINE

## 2025-06-20 PROCEDURE — 700102 HCHG RX REV CODE 250 W/ 637 OVERRIDE(OP): Performed by: STUDENT IN AN ORGANIZED HEALTH CARE EDUCATION/TRAINING PROGRAM

## 2025-06-20 PROCEDURE — 700102 HCHG RX REV CODE 250 W/ 637 OVERRIDE(OP): Performed by: SURGERY

## 2025-06-20 PROCEDURE — A9270 NON-COVERED ITEM OR SERVICE: HCPCS | Performed by: SURGERY

## 2025-06-20 PROCEDURE — A9270 NON-COVERED ITEM OR SERVICE: HCPCS | Performed by: STUDENT IN AN ORGANIZED HEALTH CARE EDUCATION/TRAINING PROGRAM

## 2025-06-20 RX ORDER — GABAPENTIN 100 MG/1
200 CAPSULE ORAL 3 TIMES DAILY
Qty: 90 CAPSULE | Refills: 0 | Status: SHIPPED | OUTPATIENT
Start: 2025-06-20 | End: 2025-06-24 | Stop reason: SDUPTHER

## 2025-06-20 RX ORDER — ASPIRIN 81 MG/1
81 TABLET ORAL DAILY
Qty: 100 TABLET | Refills: 3 | Status: SHIPPED | OUTPATIENT
Start: 2025-06-21

## 2025-06-20 RX ORDER — HYDROCODONE BITARTRATE AND ACETAMINOPHEN 5; 325 MG/1; MG/1
1 TABLET ORAL EVERY 6 HOURS PRN
Qty: 20 TABLET | Refills: 0 | Status: SHIPPED | OUTPATIENT
Start: 2025-06-20 | End: 2025-06-25

## 2025-06-20 RX ORDER — CLOPIDOGREL BISULFATE 75 MG/1
75 TABLET ORAL DAILY
Qty: 30 TABLET | Refills: 3 | Status: SHIPPED | OUTPATIENT
Start: 2025-06-21

## 2025-06-20 RX ADMIN — ACETAMINOPHEN 650 MG: 325 TABLET ORAL at 04:51

## 2025-06-20 RX ADMIN — ASPIRIN 81 MG: 81 TABLET, COATED ORAL at 04:49

## 2025-06-20 RX ADMIN — GABAPENTIN 200 MG: 100 CAPSULE ORAL at 11:57

## 2025-06-20 RX ADMIN — GABAPENTIN 200 MG: 100 CAPSULE ORAL at 04:50

## 2025-06-20 RX ADMIN — CLOPIDOGREL BISULFATE 75 MG: 75 TABLET, FILM COATED ORAL at 04:50

## 2025-06-20 RX ADMIN — LOSARTAN POTASSIUM 100 MG: 50 TABLET, FILM COATED ORAL at 04:50

## 2025-06-20 RX ADMIN — AMLODIPINE BESYLATE 5 MG: 5 TABLET ORAL at 04:50

## 2025-06-20 RX ADMIN — NICOTINE TRANSDERMAL SYSTEM 21 MG: 21 PATCH, EXTENDED RELEASE TRANSDERMAL at 04:49

## 2025-06-20 RX ADMIN — HYDRALAZINE HYDROCHLORIDE 25 MG: 25 TABLET ORAL at 04:50

## 2025-06-20 ASSESSMENT — PAIN DESCRIPTION - PAIN TYPE
TYPE: ACUTE PAIN

## 2025-06-20 NOTE — CARE PLAN
Problem: Knowledge Deficit - Standard  Goal: Patient and family/care givers will demonstrate understanding of plan of care, disease process/condition, diagnostic tests and medications  Outcome: Progressing     Problem: Pain - Standard  Goal: Alleviation of pain or a reduction in pain to the patient’s comfort goal  Outcome: Progressing     Problem: Skin Integrity  Goal: Skin integrity is maintained or improved  Outcome: Progressing   The patient is Stable - Low risk of patient condition declining or worsening    Shift Goals  Clinical Goals: Pain control, mobilization with staff  Patient Goals: Pain control, rest  Family Goals: No family present at this time    Progress made toward(s) clinical / shift goals:  Medicated per MAR prn pain    Patient is not progressing towards the following goals:

## 2025-06-20 NOTE — PROGRESS NOTES
"Assumed care of patient from night shift RN at 0700.  Patient is alert and oriented times 4, denies pain at this time.  VSS /62   Pulse 65   Temp 36.5 °C (97.7 °F) (Temporal)   Resp 16   Ht 1.575 m (5' 2\")   Wt 66.5 kg (146 lb 9.7 oz)   LMP  (LMP Unknown)   SpO2 96%   BMI 26.81 kg/m²   PIV in the RFA, patent and saline locked.  On 1L oxygen with saturations in the mid 90s.  Weaning as tolerated.  Walking O2 studies to be done this AM  Tele monitored  Last BM 6/17, bowel protocol in use.  + flatus.  Urinating without difficulty.  Cardiac diet, tolerating well.  Incision to the L groin, dressing CDI  L toes purple and red, warm with good cap refill.  Gauze between each toe and foot floated on a pillow.  Patient is a 1 person hand held assist, demonstrates steady gait, L sided deficits from previous stroke.  Low fall risk, bed alarm not in use.  POC discussed for the day, bed is locked and in the lowest position, call light is within reach.  All needs are met at this time, hourly rounding is in place.  "

## 2025-06-20 NOTE — PROGRESS NOTES
Report received from RN, assumed care at 1900  Pt is A0X4, and responds appropriately, remote telemonitored  Pt declines any SOB, chest pain, new onset of numbness/ tingling  Pt is on 1L NC  Pt rates pain at 3/10, on a scale of 1-10, pt medicated per MAR  Pt is voiding adequately and without hesitancy  Pt has + flatus, + bowel sounds, - BM, PTA on 6/17  Pt ambulates with a x1 HH assist, utilizes cane at baseline  Pt is tolerating a cardiac diet, pt denies any nausea/vomiting  Plan of care discussed, all questions answered. Explained importance of calling before getting OOB and pt verbalizes understanding. Explained importance of oral care. Call light is within reach, treaded slipper socks on, bed in lowest/ locked position, hourly rounding in place, all needs met at this time

## 2025-06-20 NOTE — PROGRESS NOTES
"                 VASCULAR SURGERY               Inpatient Progress Note  _________________________________    Vitals   /63   Pulse 72   Temp 36.7 °C (98.1 °F) (Temporal)   Resp 16   Ht 1.575 m (5' 2\")   Wt 66.5 kg (146 lb 9.7 oz)   LMP  (LMP Unknown)   SpO2 88%   BMI 26.81 kg/m²   _________________________________    History:  6/18/25 left common and external iliac IVL, ex iliac stent (Lilia)    Today:  Feeling well, left foot well perfused  Left groin bandage changed, site CDI    A/P)  Home today  Asa 81 daily indefinitely  Statin indefinitely  Plavix 75 daily for 1 month  Recommend continuing gabapentin for now    Patient can remove her bandage tomorrow and leave [facility] and get the site wet    Follow-up with Dr. Rodrigues 3-4 weeks      Keith Nicholson MD  [facility]  [facility] preferred or call my office [phone]  __________________________________________________________________  Patient:Rubi Urbina   MRN:[id number]   CSN:[id number]    "

## 2025-06-20 NOTE — DISCHARGE INSTRUCTIONS
_____________________________________________________         VASCULAR SURGERY SERVICE    Discharge Instructions  Procedure: Peripheral Angiography    1) Activity:  Ok to resume normal activities.  No strenuous activity or lifting over 15 pounds for 2 days    2) Bandage: Remove your bandage 1 day after the procedure.  A new bandage is not necessary.    3) Bathing:   It is OK to shower any time but no soaking in a bath or pool for 3 days after the procedure. The bandage is waterproof. After the dressing removed the puncture site can get wet directly.    4) Diet:  You can resume your normal diet after your procedure.    5) Medications:  You may resume all of your normal home medications. You can take Tylenol or Ibuprofen or similar over-the-counter medications for pain.    6) Follow-up appointments: Please call [phone] to schedule a follow up appointment with your surgeon in 2-4 weeks to discuss the results of the procedure.     7) Call [phone]  if you have any concerns after the procedure, such as increased pain, swelling, drainage, or other symptoms.  You should go to the ER if you are having severe symptoms, such as chest pain, shortness of breath, bleeding, or other similar symptoms.    Office address:   [facility]  [address] [place] [address]  [place] [id number]  [phone]    _____________________________________________________

## 2025-06-20 NOTE — PROGRESS NOTES
D/c instructions given and educated on worsening s/s. Pt understands and questions answered. D/c home with , meds to bed given and verified.

## 2025-06-20 NOTE — PROGRESS NOTES
[facility] Medicine Daily Progress Note    Date of Service  6/19/2025    Chief Complaint  Rubi Urbina is a 64 y.o. female admitted 6/17/2025 with swelling    Hospital Course  64-year-old female with history of asthma, smoking, dyslipidemia, and seizure who presented 6/17 with pain on the left foot.  Her symptoms started last February with some pain on her left foot and later started having discoloration with sharp pain especially at night, comes and goes, patient noticed dark discoloration on all her toes beginning of May however she did not go to the emergency or PCP.  Patient came on 6/17 with worsening pain.  On admission patient denied any chest pain or shortness of breath and no other symptoms, on exam patient had discoloration on all her toes specially fifth toe on the left leg.  Posterior tibial artery pulse were okay however dorsalis pedis artery pulse was not intact.  ED physician discussed the case with Dr. Rodrigues vascular surgeon and planning for CTA and possible surgery, vascular surgeon did not get any recommendation for anticoagulation until CTA and possible surgery.       Interval Problem Update  6/18  Vitals, afebrile, pulse from  respiratory rate 13-21 systolic blood pressure  pulse ox 93 to 99% on 2 L nasal cannula postop.  CBC hemoglobin 16.7, CMP unremarkable.  Patient went to the operating room with vascular surgery today for bilateral lower extremity angiogram, abdominal aortogram, abdominal aorta catheter, underwent left external iliac artery stenting, tolerated procedure well.  On aspirin and Plavix.  Pain controlled, no new complaints.    6/19  Afebrile with normal blood pressure, respiratory rate, heart rate, oxygen saturations 88 to 94% on 1 to 2 L nasal cannula.  No leukocytosis, hemoglobin 16.7, CMP unremarkable, continue on heparin drip.  On aspirin and Plavix, vascular following.  Neurontin started for revascularization pain.  Encourage ambulation as able.  Dry gauze  is placed between the toes.  Improvement of the coloration of her second digit, first digit still rather purple, warm, no ongoing pain just having sudden spikes of pain suspecting associated with revascularization.  Tolerating p.o. intake.    I have discussed this patient's plan of care and discharge plan at IDT rounds today with Case Management, Nursing, Nursing leadership, and other members of the IDT team.    Consultants/Specialty  vascular surgery    Code Status  Full Code    Disposition  The patient is not medically cleared for discharge to home or a post-acute facility.      I have placed the appropriate orders for post-discharge needs.    Review of Systems  ROS   Review of Systems   Constitutional:  Positive for malaise/fatigue. Negative for chills, fever and weight loss.   HENT:  Negative for ear pain, hearing loss and tinnitus.    Eyes:  Negative for blurred vision, double vision and photophobia.   Respiratory:  Negative for cough and hemoptysis.    Cardiovascular:  Negative for chest pain, palpitations, orthopnea and claudication.   Gastrointestinal:  Negative for abdominal pain, constipation, diarrhea, nausea and vomiting.   Genitourinary:  Negative for dysuria, frequency and urgency.   Musculoskeletal:  Negative for myalgias and neck pain.   Skin:  Positive for itching and rash.   Neurological:  Negative for dizziness, speech change and weakness.    Physical Exam  Temp:  [36.5 °C (97.7 °F)-37.2 °C (99 °F)] 36.5 °C (97.7 °F)  Pulse:  [65-91] 65  Resp:  [16-18] 16  BP: (123-137)/(54-70) 128/62  SpO2:  [92 %-96 %] 96 %    Physical Exam  Vitals and nursing note reviewed.   Constitutional:       General: She is not in acute distress.     Appearance: She is not toxic-appearing.   HENT:      Head: Normocephalic and atraumatic.      Nose: Nose normal. No rhinorrhea.      Mouth/Throat:      Mouth: Mucous membranes are moist.      Pharynx: Oropharynx is clear.   Eyes:      General: No scleral icterus.      Extraocular Movements: Extraocular movements intact.      Conjunctiva/sclera: Conjunctivae normal.   Cardiovascular:      Rate and Rhythm: Normal rate and regular rhythm.      Pulses: Normal pulses.   Pulmonary:      Effort: No respiratory distress.      Breath sounds: No wheezing, rhonchi or rales.   Abdominal:      General: There is no distension.      Palpations: Abdomen is soft.      Tenderness: There is no abdominal tenderness. There is no guarding or rebound.   Musculoskeletal:         General: Tenderness present. Normal range of motion.      Cervical back: Normal range of motion and neck supple. No rigidity.      Comments: Toes 1 and 2 dusky/purple, warm, second digit improving, first digit still rather dusky   Skin:     General: Skin is warm and dry.      Capillary Refill: Capillary refill takes less than 2 seconds.      Comments: Left femoral artery access site in the groin dressing intact   Neurological:      General: No focal deficit present.      Mental Status: She is alert and oriented to person, place, and time. Mental status is at baseline.      Cranial Nerves: No cranial nerve deficit.   Psychiatric:         Mood and Affect: Mood normal.         Behavior: Behavior normal.         Thought Content: Thought content normal.         Judgment: Judgment normal.         Fluids    Intake/Output Summary (Last 24 hours) at 6/20/2025 0804  Last data filed at 6/19/2025 2100  Gross per 24 hour   Intake 120 ml   Output --   Net 120 ml        Laboratory  Recent Labs     06/17/25  1204 06/18/25  0655 06/19/25  0132   WBC 7.2 6.0 8.6   RBC 6.45* 5.75* 4.99   HEMOGLOBIN 18.6* 16.7* 14.3   HEMATOCRIT 57.3* 51.1* 44.0   MCV 88.8 88.9 88.2   MCH 28.8 29.0 28.7   MCHC 32.5 32.7 32.5   RDW 45.5 46.0 45.1   PLATELETCT 223 183 220   MPV 8.5* 8.6* 8.7*     Recent Labs     06/17/25  1204 06/18/25  0655 06/19/25  0132   SODIUM 139 139 138   POTASSIUM 3.9 4.3 3.9   CHLORIDE 102 107 104   CO2 24 20 21   GLUCOSE 80 92 88   BUN 13  15 15   CREATININE 0.79 0.76 0.66   CALCIUM 9.5 8.8 8.6     Recent Labs     06/17/25  1257 06/17/25  1611   APTT 27.4 24.9   INR 1.02 1.13         Recent Labs     06/18/25  0655   TRIGLYCERIDE 179*   HDL 48   LDL 79       Imaging  CT-CTA AORTA-RO WITH & W/O-POST PROCESS   Final Result      1.  Probable moderate to severe stenosis of the origin of the left external iliac artery with decreased contrast opacification distally throughout the left lower extremity.   2.  The left internal iliac artery also demonstrates moderate to severe multifocal stenosis near the origin with patency of distal vessels.   3.  Single vessel supply to the left foot provided by the posterior tibial artery.   4.  Large left-sided abdominal wall hernia containing the splenic flexure of the colon.   5.  Hepatomegaly.         US-EXTREMITY ARTERY LOWER UNILAT LEFT   Final Result      US-RATNA SINGLE LEVEL BILAT   Final Result      DX-FOOT-COMPLETE 3+ LEFT   Final Result      Deformity with associated transversely oriented lucency of the first distal phalanx which could indicate age-indeterminate fracture.      IR-ABDOMINAL AORTA-WITH RUNOFF    (Results Pending)        Assessment/Plan  * Lower limb ischemia- (present on admission)  Assessment & Plan  Likely chronic, months with discoloration in her toes  Came with worsening pain  CTA showed moderate to severe stenosis in the origin of the left iliac artery  Vascular surgeon Dr. Rodrigues was consulted and waiting for further recommendation according to surgery and anticoagulation after CTA  Admit the patient to telemetry  Continue atorvastatin  Start with heparin drip  N.p.o. after midnight      Primary hypertension- (present on admission)  Assessment & Plan  Continue amlodipine and losartan    Seizure disorder (HCC)- (present on admission)  Assessment & Plan  Continue home medication oxcarbazepine  Follow-up with seizure clinic.    Cigarette smoker- (present on admission)  Assessment & Plan  I spent  5 minutes counseling the patient on cessation techniques and resources were offered including nicotine patches, gum, along with medical treatment with wellbutrin and chantix. The patient understands continuing to smoke could lead to complications such as lung disease, heart attack, stroke and death.  The benefits of stopping were also presented to him. The patient verbalized understanding. CPT CODE: 23898 (3-10 minutes tobacco counseling).   Discussed with Sandra about worsening peripheral artery disease and losing her from smoking, she understood.    History of cerebrovascular accident (CVA) with residual deficit- (present on admission)  Assessment & Plan  Left-sided weakness and discoordination  Patient is not on aspirin or antiplatelets  Continue atorvastatin and consider aspirin         VTE prophylaxis:   SCDs/TEDs      I have performed a physical exam and reviewed and updated ROS and Plan today (6/19/2025). In review of yesterday's note (6/18/2025), there are no changes except as documented above.  Total time spent 54 minutes. I spent greater than 50% of the time for patient care, counseling, and coordination on this date, including unit/floor time, and face-to-face time with the patient as per interval events, my own review of patient's imaging and lab analysis and developing my assessment and plan above.

## 2025-06-20 NOTE — CARE PLAN
Problem: Knowledge Deficit - Standard  Goal: Patient and family/care givers will demonstrate understanding of plan of care, disease process/condition, diagnostic tests and medications  Outcome: Progressing     Problem: Pain - Standard  Goal: Alleviation of pain or a reduction in pain to the patient’s comfort goal  Outcome: Progressing     Problem: Skin Integrity  Goal: Skin integrity is maintained or improved  Outcome: Progressing       The patient is Stable - Low risk of patient condition declining or worsening    Shift Goals  Clinical Goals: pain control, remote tele monitoring  Patient Goals: pain control, rest  Family Goals: MIKE    Progress made toward(s) clinical / shift goals:  Pt reported pain controlled per MAR. Pt has remote telemonitor in place. LLE elevated with heel float boot, gauze between toes per order. Pt rested this shift.

## 2025-06-23 ENCOUNTER — PATIENT OUTREACH (OUTPATIENT)
Dept: MEDICAL GROUP | Facility: MEDICAL CENTER | Age: 65
End: 2025-06-23
Payer: MEDICARE

## 2025-06-23 NOTE — PROGRESS NOTES
"Transitional Care Management  TCM Outreach Date and Time: Filed (6/23/2025  8:45 AM)    Discharge Questions  Actual Discharge Date: 06/20/25  Now that you are home, how are you feeling?: Very Good (\"wonderful\"; elevating foot and the color is \"normal color\")  Did you receive any new prescriptions?: Yes (ASA, plavix, gabapentin, hydrocodone-acet)  Were you able to get them filled?: Yes  Meds to Bed or Pharmacy filled?: Meds to Bed  Do you have any questions about your current medications or new medications (Review Med Rec)?: No  Did you have any durable medical equipment ordered?: No  Do you have a follow up appointment scheduled with your PCP?: Yes  Appointment Date: 06/24/25  Appointment Time: 1400  Any issues or paperwork you wish to discuss with your PCP?: Yes (Please specify) (new meds and the refills)  Are you (patient) able to get to the appointment?: Yes  If Home Health was ordered, have they contacted you (Patient): Not Applicable  Did you have enough support after your last discharge?: Yes (spouse and granddaughter)  Does this patient qualify for the CCM program?: No    Transitional Care  Number of attempts made to contact patient: 1  Current or previous attempts completed within two business days of discharge? : Yes  Provided education regarding treatment plan, medications, self-management, ADLs?: Yes (elevate LE, ED precautions)  Has patient completed an Advanced Directive?: No  Has the Care Manager's phone number provided?: Yes  Is there anything else I can help you with?: No    Discharge Summary  Chief Complaint: Foot Swelling       Left foot pain with purple in color. Patient reports decreased sensation in foot due to previous stroke.  Admitting Diagnosis: PAD with rest pain  Discharge Diagnosis: Lower limb ischemia        " Cinar

## 2025-06-24 ENCOUNTER — OFFICE VISIT (OUTPATIENT)
Dept: MEDICAL GROUP | Facility: MEDICAL CENTER | Age: 65
End: 2025-06-24
Payer: MEDICARE

## 2025-06-24 VITALS
DIASTOLIC BLOOD PRESSURE: 56 MMHG | OXYGEN SATURATION: 95 % | HEIGHT: 62 IN | HEART RATE: 93 BPM | WEIGHT: 150.2 LBS | BODY MASS INDEX: 27.64 KG/M2 | SYSTOLIC BLOOD PRESSURE: 114 MMHG | RESPIRATION RATE: 14 BRPM | TEMPERATURE: 97.6 F

## 2025-06-24 DIAGNOSIS — I73.9 PERIPHERAL ARTERIAL DISEASE (HCC): Primary | ICD-10-CM

## 2025-06-24 DIAGNOSIS — I70.0 ATHEROSCLEROSIS OF AORTA (HCC): ICD-10-CM

## 2025-06-24 DIAGNOSIS — I10 PRIMARY HYPERTENSION: ICD-10-CM

## 2025-06-24 DIAGNOSIS — G40.909 SEIZURE DISORDER (HCC): Chronic | ICD-10-CM

## 2025-06-24 DIAGNOSIS — F17.210 CIGARETTE SMOKER: ICD-10-CM

## 2025-06-24 DIAGNOSIS — J41.0 SIMPLE CHRONIC BRONCHITIS (HCC): Chronic | ICD-10-CM

## 2025-06-24 DIAGNOSIS — Z23 NEED FOR VACCINATION: ICD-10-CM

## 2025-06-24 DIAGNOSIS — E55.9 VITAMIN D DEFICIENCY: ICD-10-CM

## 2025-06-24 DIAGNOSIS — G89.18 POSTOPERATIVE PAIN: ICD-10-CM

## 2025-06-24 DIAGNOSIS — Z12.31 ENCOUNTER FOR SCREENING MAMMOGRAM FOR MALIGNANT NEOPLASM OF BREAST: ICD-10-CM

## 2025-06-24 DIAGNOSIS — G47.34 NOCTURNAL HYPOXIA: ICD-10-CM

## 2025-06-24 DIAGNOSIS — Z12.11 COLON CANCER SCREENING: ICD-10-CM

## 2025-06-24 RX ORDER — GABAPENTIN 100 MG/1
200 CAPSULE ORAL 3 TIMES DAILY
Qty: 180 CAPSULE | Refills: 2 | Status: SHIPPED | OUTPATIENT
Start: 2025-06-24

## 2025-06-24 RX ORDER — AMLODIPINE BESYLATE 5 MG/1
5 TABLET ORAL DAILY
Qty: 90 TABLET | Refills: 3 | Status: SHIPPED | OUTPATIENT
Start: 2025-06-24

## 2025-06-24 RX ORDER — LOSARTAN POTASSIUM 100 MG/1
100 TABLET ORAL DAILY
Qty: 90 TABLET | Refills: 3 | Status: SHIPPED | OUTPATIENT
Start: 2025-06-24

## 2025-06-24 ASSESSMENT — FIBROSIS 4 INDEX: FIB4 SCORE: 1.67

## 2025-06-24 NOTE — PROGRESS NOTES
"Transitional Care Management  TCM Outreach Date and Time: Filed (6/23/2025  8:45 AM)     Discharge Questions  Actual Discharge Date: 06/20/25  Now that you are home, how are you feeling?: Very Good (\"wonderful\"; elevating foot and the color is \"normal color\")  Did you receive any new prescriptions?: Yes (ASA, plavix, gabapentin, hydrocodone-acet)  Were you able to get them filled?: Yes  Meds to Bed or Pharmacy filled?: Meds to Bed  Do you have any questions about your current medications or new medications (Review Med Rec)?: No  Did you have any durable medical equipment ordered?: No  Do you have a follow up appointment scheduled with your PCP?: Yes  Appointment Date: 06/24/25  Appointment Time: 1400  Any issues or paperwork you wish to discuss with your PCP?: Yes (Please specify) (new meds and the refills)  Are you (patient) able to get to the appointment?: Yes  If Home Health was ordered, have they contacted you (Patient): Not Applicable  Did you have enough support after your last discharge?: Yes (spouse and granddaughter)  Does this patient qualify for the CCM program?: No     Transitional Care  Number of attempts made to contact patient: 1  Current or previous attempts completed within two business days of discharge? : Yes  Provided education regarding treatment plan, medications, self-management, ADLs?: Yes (elevate LE, ED precautions)  Has patient completed an Advanced Directive?: No  Has the Care Manager's phone number provided?: Yes  Is there anything else I can help you with?: No     Discharge Summary  Chief Complaint: Foot Swelling       Left foot pain with purple in color. Patient reports decreased sensation in foot due to previous stroke.  Admitting Diagnosis: PAD with rest pain  Discharge Diagnosis: Lower limb ischemia    Subjective:     Rubi Urbina is a 64 y.o. female who presents for Hospital Follow-up.    HPI:   Recently hospitalized for ischemic foot    History of Present Illness  The " patient presents for a hospital follow-up.    Foot Injury and Ischemia  She reports an improvement in her condition, attributing it to the resolution of her foot issue. In February 2025, a 40-pound bookcase fell on her left foot, causing significant pain. Despite the pain, she was able to move and bend her toes. The pain was managed with Tylenol and subsided after six weeks. However, three days later, she noticed her two smallest toes turning purple, accompanied by a burning sensation in her foot. She sought medical attention on 06/17/2025, and was diagnosed with ischemia, or decreased blood flow to her foot. She was hospitalized from 06/17/2025 to 06/20/2025. Standing alleviates the pain. Her foot remains purple when lowered but clears up when elevated. She also mentions a sensation similar to a rubber band snapping at the bottom of her toe, which resulted in a large red gregorio. Her foot is less painful than it has been in the past three months. She continues to take gabapentin three times a day for pain management.  - Onset: February 2025 (bookcase fell on foot); 06/17/2025 (diagnosed with ischemia).  - Location: Left foot.  - Duration: Pain subsided after six weeks; ischemia diagnosed three days after initial injury.  - Character: Significant pain, purple toes, burning sensation, rubber band snapping sensation.  - Alleviating/Aggravating Factors: Standing alleviates pain; foot clears up when elevated.  - Timing: Pain less severe than in the past three months.  - Severity: Managed with Tylenol and gabapentin; hospitalized for ischemia.    Smoking Cessation  She has reduced her smoking from a pack a day to a pack since returning home on Friday. She has received nicotine patches and is considering using them along with gum. Efforts are being made to quit smoking.  - Onset: Since returning home on Friday.  - Alleviating/Aggravating Factors: Nicotine patches and gum.  - Timing: Efforts ongoing.    Oxygen Use During  "Hospital Stay  She was required to use oxygen during her hospital stay due to low levels during sleep. She reports feeling rested upon waking and has not been observed to stop breathing or snore during sleep. Her  occasionally wakes her from deep sleep due to slow breathing.  - Onset: During hospital stay.  - Character: Low oxygen levels during sleep.  - Severity: Required oxygen use.    Seizure Management  She continues to take Trileptal 300 mg twice daily and has not experienced any seizures. She underwent surgery where her arteries were connected through her thigh. Post-surgery, she has regained the ability to bend her index finger and toes, and can now walk flat-footed.  - Onset: Post-surgery.  - Character: Regained ability to bend index finger and toes; can walk flat-footed.  - Timing: Continues to take Trileptal 300 mg twice daily.  - Severity: No seizures experienced.    Blood Pressure Management  She is on amlodipine 5 mg and losartan 100 mg for blood pressure management.  - Alleviating/Aggravating Factors: Amlodipine and losartan.    PAST SURGICAL HISTORY:  - Surgery for abdominal wall hernia  - Vascular surgery including percutaneous cannulation of the left common femoral artery, abdominal aortogram, bilateral lower extremity angiogram, endovascular lithotripsy, and stent placement in the left external iliac artery    SOCIAL HISTORY  The patient has cut down on smoking and has only smoked a pack of cigarettes since Friday.        Current medicines (including reconciliation performed today)  Current Medications[1]    Allergies:   Patient has no known allergies.    Social History[2]    ROS:  See HPI    Objective:     Vitals:    06/24/25 1351   BP: 114/56   BP Location: Right arm   Patient Position: Sitting   BP Cuff Size: Adult   Pulse: 93   Resp: 14   Temp: 36.4 °C (97.6 °F)   TempSrc: Temporal   SpO2: 95%   Weight: 68.1 kg (150 lb 3.2 oz)   Height: 1.575 m (5' 2\")     Body mass index is 27.47 " kg/m².    Physical Exam:  Physical Exam  General Appearance: Normal.  Vital signs: Within normal limits.  HEENT: Within normal limits.  Respiratory: Within normal limits.  Back, Musculoskeletal: Good capillary refill in the left foot, which is warm.  Extremities: Good pedal pulse and decent dorsalis pulse in the left foot.  Skin: Warm and dry, no rash.  Neurological: Normal.    Results  Labs   - A1c: 06/18/2025, 5.5   - Total cholesterol: 06/18/2025, 163   - LDL: 06/18/2025, 79   - Triglycerides: 06/18/2025, 179    Imaging   - X-ray of the left foot: 06/17/2025, Age indeterminate fracture   - Ultrasound of the artery: Greater than 75% stenosis in the proximal left external iliac artery   - CTA of the aorta with runoff: Probable moderate to severe stenosis of the origin of the left external iliac artery, with decreased opacification distally throughout the left lower extremity. The left internal iliac artery also demonstrates moderate to severe multifocal stenosis near the origin with patency of distal vessels and single vessel supplied the left foot provided by the posterior tibial artery. Large left-sided abdominal wall hernia containing the splenic flexure of the colon. Enlarged liver.    Diagnostic Testing   - Ankle brachial index (RATNA): 06/17/2025, Normal right RATNA and severely reduced left RATNA suggesting vascular blockage in the arteries       Assessment and Plan:   1. Peripheral arterial disease (HCC)  - Comp Metabolic Panel; Future  - Lipid Profile; Future    2. Cigarette smoker  - CT-LUNG CANCER-SCREENING; Future    3. Simple chronic bronchitis (HCC)  - PULMONARY FUNCTION TESTS -Test requested: Complete Pulmonary Function Test; Include MIPS/MEPS? No; Future    4. Nocturnal hypoxia  - Overnight Home Sleep Study; Future    5. Primary hypertension  - amLODIPine (NORVASC) 5 MG Tab; Take 1 Tablet by mouth every day.  Dispense: 90 Tablet; Refill: 3  - losartan (COZAAR) 100 MG Tab; Take 1 Tablet by mouth every day.   Dispense: 90 Tablet; Refill: 3  - Comp Metabolic Panel; Future    6. Seizure disorder (HCC)  - Comp Metabolic Panel; Future    7. Postoperative pain  - gabapentin (NEURONTIN) 100 MG Cap; Take 2 Capsules by mouth 3 times a day.  Dispense: 180 Capsule; Refill: 2    8. Atherosclerosis of aorta (HCC)  - Comp Metabolic Panel; Future  - Lipid Profile; Future    9. Vitamin D deficiency disease  - VITAMIN D,25 HYDROXY (DEFICIENCY); Future    10. Encounter for screening mammogram for malignant neoplasm of breast  - MA-SCREENING MAMMO BILAT W/TOMOSYNTHESIS W/CAD; Future    11. Colon cancer screening  - Cologuard® colon cancer screening    12. Need for vaccination  - Pneumococcal Conjugate Vaccine 20-Valent (6 wks+)      Assessment & Plan  PAD: Healing.  - Foot fracture is healing, which may still cause symptoms depending on posture and movement. Tissues are undergoing reperfusion after a period of inadequate blood flow, which can be painful.  - Blood flow was significantly restricted, leading to empty blood vessels that are now refilling. Good pedal pulse and decent dorsalis pulse. Foot is warm, not cold, indicating improved circulation.  - Currently on aspirin 81 mg indefinitely, clopidogrel 75 mg for one month, and atorvastatin 40 mg indefinitely.  - A1c was 5.5 on 06/18/2025, indicating good control of blood sugar levels. Cholesterol levels within acceptable limits: total cholesterol 163, LDL 79, triglycerides 179. Goal to maintain LDL < 70 and triglycerides < 150.  - Gabapentin can be continued for numbness, tingling, and leg pain. Refill for gabapentin will be sent.    Smoking cessation.  - Reduced smoking from a pack a day to a pack since returning home on 06/20/2025. Received nicotine patches and considering using them along with gum. Making efforts to quit smoking.  - Advised to use one 21 mg nicotine patch every 24 hours for a month, then reduce to 14 mg for a month, and finally to 7 mg. Advised to use gum or a  toothpick to keep hands and mouth occupied.  - Informed that smoking could clog the stent again and that quitting smoking is crucial for vascular disease.    Chronic bronchitis/COPD.  - Required to use oxygen during hospital stay due to low levels during sleep. Reports feeling rested upon waking and has not been observed to stop breathing or snore during sleep.  occasionally wakes her from deep sleep due to slow breathing.  - Home test to check if oxygen levels drop during sleep and if disordered breathing is present. Overnight home sleep study will be ordered.    Hypertension: Stable.  - On amlodipine 5 mg and losartan 100 mg for blood pressure management. Refill for blood pressure medication will be sent.    Seizure disorder: Stable.  - Continues to take Trileptal 300 mg twice daily and has not experienced any seizures.    Health maintenance.  - Overdue for mammogram and lung cancer screening. Last lung cancer screening in 2023 did not show any mass or nodule but did show some chronic inflammation in the lower lobes.  - Cologuard test will be resent. Mammogram and lung cancer screening will be ordered. Pulmonary function test will be ordered to check for evidence of COPD. Overnight home sleep study will be ordered. Pneumonia shot (PCV 20) will be administered today.  - Kidney and liver function, cholesterol, and vitamin D levels will be rechecked later this year.    Follow-up  - Appointment with vascular surgery to be scheduled in three weeks.        - Chart and discharge summary were reviewed.   - Hospitalization and results reviewed with patient.   - Medications reviewed including instructions regarding high risk medications, dosing and side effects.  - Recommended Services: No services needed at this time  - Advance directive/POLST on file?  No     Follow-up:Return in about 4 months (around 10/24/2025), or if symptoms worsen or fail to improve, for Lab F/U, Imaging F/U.    Face-to-face transitional care  management services with HIGH (today's visit is within days post discharge & LACE+ score 59+) medical decision complexity were provided.     LACE+ Historical Score Over Time (0-28: Low, 29-58: Medium, 59+: High): 67     Billing : secondary to the complexity of this patient's illnesses and their interactions.  See assessment and plan above for the comprehensive evaluation and management of the patient's acute and chronic concerns.  As the patient's PCP, I am the continued focal point for all health care services for the patient's needs and ongoing subsequent medical care.  All problems listed were discussed during the office visit, medications were evaluated and complexities were discussed, as well as plan for the future.          [1]   Current Outpatient Medications   Medication Sig Dispense Refill    amLODIPine (NORVASC) 5 MG Tab Take 1 Tablet by mouth every day. 90 Tablet 3    losartan (COZAAR) 100 MG Tab Take 1 Tablet by mouth every day. 90 Tablet 3    gabapentin (NEURONTIN) 100 MG Cap Take 2 Capsules by mouth 3 times a day. 180 Capsule 2    aspirin 81 MG EC tablet Take 1 Tablet by mouth every day. 100 Tablet 3    clopidogrel (PLAVIX) 75 MG Tab Take 1 Tablet by mouth every day. 30 Tablet 3    HYDROcodone-acetaminophen (NORCO) 5-325 MG Tab per tablet Take 1 Tablet by mouth every 6 hours as needed (severe pain) for up to 5 days. 20 Tablet 0    acetaminophen (TYLENOL) 500 MG Tab Take 500-1,000 mg by mouth every 6 hours as needed. Indications: Pain      atorvastatin (LIPITOR) 40 MG Tab Take 1 Tablet by mouth at bedtime. 90 Tablet 3    OXcarbazepine (TRILEPTAL) 300 MG Tab Take 1 Tablet by mouth 2 times a day. 180 Tablet 3    albuterol 108 (90 Base) MCG/ACT Aero Soln inhalation aerosol Inhale 2 Puffs every four hours as needed for Shortness of Breath. 1 Each 11    Cholecalciferol (VITAMIN D3 PO) Take 1 Tablet by mouth every day.       No current facility-administered medications for this visit.   [2]   Social  History  Tobacco Use    Smoking status: Every Day     Current packs/day: 1.00     Average packs/day: 1 pack/day for 30.0 years (30.0 ttl pk-yrs)     Types: Cigarettes     Passive exposure: Never    Smokeless tobacco: Never    Tobacco comments:     Started smoking at age 16 pack a day    Vaping Use    Vaping status: Never Used   Substance Use Topics    Alcohol use: Yes     Comment: occ    Drug use: No

## 2025-06-27 ENCOUNTER — TELEPHONE (OUTPATIENT)
Dept: HEALTH INFORMATION MANAGEMENT | Facility: OTHER | Age: 65
End: 2025-06-27

## 2025-06-27 NOTE — TELEPHONE ENCOUNTER
Outcome: Left Message    Please transfer to [facility] Outbound [phone] when patient returns call.    Attempt 1

## 2025-06-30 NOTE — Clinical Note
REFERRAL APPROVAL NOTICE         Sent on June 30, 2025                   Rubi Urbina  [address] [place] [address]                   Dear Ms. Urbina,    After a careful review of the medical information and benefit coverage, [facility] has processed your referral. See below for additional details.    If applicable, you must be actively enrolled with your insurance for coverage of the authorized service. If you have any questions regarding your coverage, please contact your insurance directly.    REFERRAL INFORMATION   Referral #:  [id number]  Referred-To Department    Referred-By Provider:  Pulmonary and Sleep Medicine    Isaias Mccray D.O.   [facility]      [address]  [place] [id number] [phone] [address] [place]  [place] [address] [phone]    Referral Start Date:  06/24/2025  Referral End Date:   06/24/2026             SCHEDULING  If you do not already have an appointment, please call [phone] to make an appointment.     MORE INFORMATION  If you do not already have a [employer] account, sign up at: [email or web address]  You can access your medical information, make appointments, see lab results, billing information, and more.  If you have questions regarding this referral, please contact  the [facility] Referrals department at:             [phone]. Monday - Friday 8:00AM - 5:00PM.     Sincerely,    [facility]

## 2025-06-30 NOTE — Clinical Note
REFERRAL APPROVAL NOTICE         Sent on June 30, 2025                   Rubi Urbina  [address] [place] [address]                   Dear Ms. Urbina,    After a careful review of the medical information and benefit coverage, [facility] has processed your referral. See below for additional details.    If applicable, you must be actively enrolled with your insurance for coverage of the authorized service. If you have any questions regarding your coverage, please contact your insurance directly.    REFERRAL INFORMATION   Referral #:  [id number]  Referred-To Department    Referred-By Provider:  Pulmonology    Isaias Mccray D.O.   Pulmonary Rehab [address]  [place] [address]  [phone] [id number]  [place] [address]  [phone]    Referral Start Date:  06/24/2025  Referral End Date:   06/24/2026             SCHEDULING  If you do not already have an appointment, please call [phone] to make an appointment.     MORE INFORMATION  If you do not already have a [employer] account, sign up at: [email or web address]  You can access your medical information, make appointments, see lab results, billing information, and more.  If you have questions regarding this referral, please contact  the [facility] Referrals department at:             [phone]. Monday - Friday 8:00AM - 5:00PM.     Sincerely,    [facility]

## 2025-07-15 ENCOUNTER — OFFICE VISIT (OUTPATIENT)
Facility: MEDICAL CENTER | Age: 65
End: 2025-07-15
Payer: MEDICARE

## 2025-07-15 VITALS
TEMPERATURE: 98.5 F | WEIGHT: 149.91 LBS | SYSTOLIC BLOOD PRESSURE: 136 MMHG | BODY MASS INDEX: 27.59 KG/M2 | HEART RATE: 74 BPM | DIASTOLIC BLOOD PRESSURE: 68 MMHG | OXYGEN SATURATION: 96 % | HEIGHT: 62 IN

## 2025-07-15 DIAGNOSIS — I77.9 PAOD (PERIPHERAL ARTERIAL OCCLUSIVE DISEASE) (HCC): Primary | ICD-10-CM

## 2025-07-15 PROCEDURE — 99024 POSTOP FOLLOW-UP VISIT: CPT | Performed by: PHYSICIAN ASSISTANT

## 2025-07-15 PROCEDURE — 3078F DIAST BP <80 MM HG: CPT | Performed by: PHYSICIAN ASSISTANT

## 2025-07-15 PROCEDURE — 3075F SYST BP GE 130 - 139MM HG: CPT | Performed by: PHYSICIAN ASSISTANT

## 2025-07-15 ASSESSMENT — FIBROSIS 4 INDEX: FIB4 SCORE: 1.67

## 2025-07-15 NOTE — PROGRESS NOTES
"                 VASCULAR SURGERY                 Clinic Progress Note  _________________________________    Vitals for Today's Visit (7/15/2025)  /68 (BP Location: Right arm, Patient Position: Sitting, BP Cuff Size: Adult)   Pulse 74   Temp 36.9 °C (98.5 °F) (Temporal)   Ht 1.575 m (5' 2\")   Wt 68 kg (149 lb 14.6 oz)   LMP  (LMP Unknown)   SpO2 96%   BMI 27.42 kg/m²   _________________________________      7/15/2025  Ms. Urbina presents for postoperative appointment.  She underwent left lower extremity angiogram which included a left common and external iliac artery intravascular lithotripsy and a left external iliac artery Yuli stenting 6/18/2025 with Dr. Rodrigues.  She presented the day prior to surgery with worsening blue discoloration of the left toes along with numbness, tingling, and pain.  Preoperative CTA was performed and showed inflow stenosis.   She underwent the above surgery successfully and has been feeling well.  She tells me today that she has quit smoking cigarettes.  She has been ambulating with diminished pain in her left lower extremity.  Of note she did suffer from a stroke years ago which left her with hemiaplasia/ gait difficulties     IMPRESSION:  1.  Patent infrarenal abdominal aorta and right iliac arterial system.  2.  The left common iliac and external iliac arteries were functionally occluded.    Following intravascular lithotripsy and stenting, there was complete   restoration of luminal patency.  3.  Patent left common femoral, profunda femoral, and superficial femoral   arteries.  The arterial system is small.  The distal circulation was not   evaluated in order to minimize the amount of contrast use.  4.  Patent right common femoral, profunda femoral, and proximal superficial femoral   arteries.  The distal circulation of the right leg was also not evaluated in   order to minimize the amount of contrast used.    Exam:  Pleasant female, no acute distress  Groins clean " and dry, no hematoma or ecchymosis  Left foot with blue discoloration of the big toe, warm to the touch.  Brisk multiphasic Doppler signal as well as palpable pulses over PT and DP area.  No ulcers or wounds.  Right foot pink and warm, well-perfused.  Compartments soft      A/P:  PAD  Blue toe syndrome    Ms. Urbina is doing well today.  She will continue to take her aspirin and Plavix as prescribed.  She knows to discontinue the Plavix about 30 days after the procedure.  She will also wean off of gabapentin as tolerated.  The importance of smoking cessation was discussed with her today and I encouraged her to continue to be tobacco and nicotine free.  And ambulation regimen was discussed with her at length today.  She will obtain noninvasive arterial testing and see us back over the next 2 to 3 months.  Red flag symptoms were discussed with her at length today.      Ivelisse Richard P.A.-C.  [facility]  [phone]  [address] [place] [address], [facility] [address]

## 2025-07-29 NOTE — TELEPHONE ENCOUNTER
Outcome: Left Message     Please transfer to [facility] Outbound [phone] when patient returns call.     Attempt 2

## (undated) DEVICE — TOWELS CLOTH SURGICAL - (4/PK 20PK/CA)

## (undated) DEVICE — SUTURE 3-0 VICRYL PLUS SH - 8X 18 INCH (12/BX)

## (undated) DEVICE — GLOVE SZ 6.5 BIOGEL PI MICRO - PF LF (50PR/BX)

## (undated) DEVICE — GOWN WARMING STANDARD FLEX - (30/CA)

## (undated) DEVICE — SUTURE  0 ETHIBOND CT-1 30 IN (36PK/BX)

## (undated) DEVICE — MARKER SIZING OMNIFLUSH 5F 70 - 5/BX .035 20CM SEGMENT

## (undated) DEVICE — TUBING CLEARLINK DUO-VENT - C-FLO (48EA/CA)

## (undated) DEVICE — GLOVE BIOGEL INDICATOR SZ 6.5 SURGICAL PF LTX - (50PR/BX 4BX/CA)

## (undated) DEVICE — SENSOR SPO2 NEO LNCS ADHESIVE (20/BX) SEE USER NOTES

## (undated) DEVICE — SODIUM CHL. INJ. 0.9% 500ML (24EA/CA 50CA/PF)

## (undated) DEVICE — GLOVE BIOGEL PI INDICATOR SZ 6.5 SURGICAL PF LF - (50/BX 4BX/CA)

## (undated) DEVICE — LACTATED RINGERS INJ 1000 ML - (14EA/CA 60CA/PF)

## (undated) DEVICE — SUTURE GENERAL

## (undated) DEVICE — DECANTER FLD BLS - (50/CA)

## (undated) DEVICE — NEPTUNE 4 PORT MANIFOLD - (20/PK)

## (undated) DEVICE — SUTURE 4-0 VICRYL PLUS FS-2 - 27 INCH (36/BX)

## (undated) DEVICE — Device

## (undated) DEVICE — KIT ANESTHESIA W/CIRCUIT & 3/LT BAG W/FILTER (20EA/CA)

## (undated) DEVICE — NEEDLE THINWALL 19GA X 7CM

## (undated) DEVICE — SODIUM CHL IRRIGATION 0.9% 1000ML (12EA/CA)

## (undated) DEVICE — DRESSING TRANSPARENT FILM TEGADERM 2.375 X 2.75" (100EA/BX)"

## (undated) DEVICE — SET EXTENSION WITH 2 PORTS (48EA/CA) ***PART #2C8610 IS A SUBSTITUTE*****

## (undated) DEVICE — KIT ROOM DECONTAMINATION

## (undated) DEVICE — GUIDEWIRES STARTER (PTFE COATED) J CURVED FIXED CORE 0.035 180CM 10 3 MM J FS

## (undated) DEVICE — GLIDEWIRE ANGLED .035 X 180 (5EA/BX)

## (undated) DEVICE — PROTECTOR ULNA NERVE - (36PR/CA)

## (undated) DEVICE — SENSOR OXIMETER ADULT SPO2 RD SET (20EA/BX)

## (undated) DEVICE — GLOVE SZ 6 BIOGEL PI MICRO - PF LF (50PR/BX 4BX/CA)

## (undated) DEVICE — SLEEVE VASO DVT COMPRESSION CALF MED - (10PR/CA)

## (undated) DEVICE — PACK ANGIOGRAPHY DRAPE - (2/CA)

## (undated) DEVICE — GLOVE BIOGEL INDICATOR SZ 7.5 SURGICAL PF LTX - (50PR/BX 4BX/CA)

## (undated) DEVICE — DEVICE INFLATION 30 ATM DISPOSABLE 20/30 INDEFLATOR 20ML (1EA)

## (undated) DEVICE — MASK ANESTHESIA ADULT  - (100/CA)

## (undated) DEVICE — ELECTRODE 850 FOAM ADHESIVE - HYDROGEL RADIOTRNSPRNT (50/PK)

## (undated) DEVICE — NEEDLE NON SAFETY 25 GA X 1 1/2 IN HYPO (100EA/BX)

## (undated) DEVICE — SPONGE GAUZESTER 4 X 4 4PLY - (128PK/CA)

## (undated) DEVICE — CANNULA O2 COMFORT SOFT EAR ADULT 7 FT TUBING (50/CA)

## (undated) DEVICE — SHEATH RO 7F 25CM (10EA/BX)

## (undated) DEVICE — SET LEADWIRE 5 LEAD BEDSIDE DISPOSABLE ECG (1SET OF 5/EA)

## (undated) DEVICE — HEAD HOLDER JUNIOR/ADULT

## (undated) DEVICE — V-18 8/300 STRAIGHT (1/EA)

## (undated) DEVICE — SYRINGE 10 ML CONTROL LL (25EA/BX 4BX/CA)

## (undated) DEVICE — DRAPE LAPAROTOMY T SHEET - (12EA/CA)

## (undated) DEVICE — MEDICINE CUP STERILE 2 OZ - (100/CA)

## (undated) DEVICE — CHLORAPREP 26 ML APPLICATOR - ORANGE TINT(25/CA)

## (undated) DEVICE — SLEEVE, VASO, THIGH, MED

## (undated) DEVICE — CANISTER SUCTION 3000ML MECHANICAL FILTER AUTO SHUTOFF MEDI-VAC NONSTERILE LF DISP  (40EA/CA)

## (undated) DEVICE — INSTRUMENT JAWCOVER SUTURE - BOOTS (5PK/BX)

## (undated) DEVICE — GLOVE SZ 7 BIOGEL PI MICRO - PF LF (50PR/BX 4BX/CA)

## (undated) DEVICE — SUCTION INSTRUMENT YANKAUER BULBOUS TIP W/O VENT (50EA/CA)

## (undated) DEVICE — SYRINGE NON SAFETY 10 CC 21 GA X 1-1/2 IN (100/BX 4BX/CA)

## (undated) DEVICE — GLOVE BIOGEL SZ 7 SURGICAL PF LTX - (50PR/BX 4BX/CA)

## (undated) DEVICE — GLOVE BIOGEL PI INDICATOR SZ 7.5 SURGICAL PF LF -(50/BX 4BX/CA)

## (undated) DEVICE — GUIDE TRACHE TUBE INTUBATING STYLET 5.0-10.0MM 14FR (20EA/PK)

## (undated) DEVICE — DRESSING TRANSPARENT FILM TEGADERM 4 X 4.75" (50EA/BX)"

## (undated) DEVICE — GOWN SURGEONS LARGE - (32/CA)

## (undated) DEVICE — GLOVE BIOGEL SZ 6.5 SURGICAL PF LTX (50PR/BX 4BX/CA)

## (undated) DEVICE — ELECTRODE DUAL RETURN W/ CORD - (50/PK)

## (undated) DEVICE — MULTI TORQUE DEVICE DISP (5EA/BX)

## (undated) DEVICE — TUBE E-T HI-LO CUFF 7.0MM (10EA/PK)

## (undated) DEVICE — PACK MAJOR BASIN - (2EA/CA)

## (undated) DEVICE — SHEATH RO 5F 10CM (10EA/BX)